# Patient Record
Sex: FEMALE | Race: BLACK OR AFRICAN AMERICAN | Employment: UNEMPLOYED | URBAN - METROPOLITAN AREA
[De-identification: names, ages, dates, MRNs, and addresses within clinical notes are randomized per-mention and may not be internally consistent; named-entity substitution may affect disease eponyms.]

---

## 2017-06-06 ENCOUNTER — HOSPITAL ENCOUNTER (INPATIENT)
Age: 32
LOS: 2 days | Discharge: HOME | DRG: 059 | End: 2017-06-09
Attending: EMERGENCY MEDICINE | Admitting: INTERNAL MEDICINE
Payer: COMMERCIAL

## 2017-06-06 DIAGNOSIS — H46.9 LEFT OPTIC NEURITIS: ICD-10-CM

## 2017-06-06 DIAGNOSIS — G35 MULTIPLE SCLEROSIS, RELAPSING-REMITTING (HCC): ICD-10-CM

## 2017-06-06 DIAGNOSIS — G35 MULTIPLE SCLEROSIS EXACERBATION (HCC): ICD-10-CM

## 2017-06-06 DIAGNOSIS — H46.9 OPTIC NEUROPATHY, LEFT: ICD-10-CM

## 2017-06-06 DIAGNOSIS — H54.62 VISION LOSS OF LEFT EYE: ICD-10-CM

## 2017-06-06 DIAGNOSIS — G35 MULTIPLE SCLEROSIS (HCC): Primary | ICD-10-CM

## 2017-06-06 DIAGNOSIS — G35 EXACERBATION OF MULTIPLE SCLEROSIS (HCC): ICD-10-CM

## 2017-06-06 DIAGNOSIS — I67.89 CEREBRAL MICROVASCULAR DISEASE: ICD-10-CM

## 2017-06-06 DIAGNOSIS — I65.23 STENOSIS OF BOTH INTERNAL CAROTID ARTERIES: ICD-10-CM

## 2017-06-06 DIAGNOSIS — G37.9 DEMYELINATING DISEASE OF CENTRAL NERVOUS SYSTEM (HCC): ICD-10-CM

## 2017-06-06 PROCEDURE — 99284 EMERGENCY DEPT VISIT MOD MDM: CPT

## 2017-06-06 RX ORDER — SODIUM CHLORIDE 0.9 % (FLUSH) 0.9 %
5-10 SYRINGE (ML) INJECTION EVERY 8 HOURS
Status: DISCONTINUED | OUTPATIENT
Start: 2017-06-06 | End: 2017-06-09 | Stop reason: HOSPADM

## 2017-06-06 RX ORDER — SODIUM CHLORIDE 0.9 % (FLUSH) 0.9 %
5-10 SYRINGE (ML) INJECTION AS NEEDED
Status: DISCONTINUED | OUTPATIENT
Start: 2017-06-06 | End: 2017-06-09 | Stop reason: HOSPADM

## 2017-06-06 NOTE — Clinical Note
Status[de-identified] Inpatient [101] Type of Bed: Telemetry [19] Inpatient Hospitalization Certified Necessary for the Following Reasons: 8. Other (further clarification in H&P documentation) Admitting Diagnosis: Multiple sclerosis exacerbation ([facility] [[id number]] Admitting Physician: Michael Douglas, [facility] Attending Physician: Es Chow [[id number]] Estimated Length of Stay: > or = to 2 Midnights Discharge Plan[de-identified] Home with Office Follow-up

## 2017-06-06 NOTE — IP AVS SNAPSHOT
[address] [facility] 
[phone] Patient: Mirta Shown MRN: [id number] :1985 You are allergic to the following Allergen Reactions Sulfa (Sulfonamide Antibiotics) Hives Recent Documentation Height Weight BMI OB Status Smoking Status 1.626 m 56 kg 21.19 kg/m2 Having regular periods Never Smoker Unresulted Labs Order Current Status ANGIOTENSIN CONVERTING ENZYME In process AQUAPORIN-4 RECEPTOR AB In process VITAMIN D, 25 HYROXY PANEL In process VIELKA QL, W/REFLEX CASCADE Preliminary result IMMUNOELECTROPHORESIS [facility].) Preliminary result Emergency Contacts Name Discharge Info Relation Home Work Mobile Tra Veliz DISCHARGE CAREGIVER [3] Spouse [3] [phone] Zunilda Steiner  Parent [1] [phone] About your hospitalization You were admitted on:  2017 You last received care in the:  [facility] [address] NEUROSCIENCE TELEMETRY You were discharged on:  2017 Unit phone number:  [phone] Why you were hospitalized Your primary diagnosis was:  Not on File Your diagnoses also included:  Multiple Sclerosis Exacerbation (Hcc), Multiple Sclerosis, Relapsing-Remitting (Hcc), Exacerbation Of Multiple Sclerosis (Hcc), Left Optic Neuritis, Stenosis Of Both Internal Carotid Arteries, Cerebral Microvascular Disease Providers Seen During Your Hospitalizations Provider Role Specialty Primary office phone Lea Palma MD Attending Provider Emergency Medicine [phone] Harriet Arias MD Attending Provider Internal Medicine [phone] Your Primary Care Physician (PCP) Primary Care Physician Office Phone Office Fax Camila Delgado [phone] Follow-up Information Follow up With Details Comments Contact Info Perez Simon MD On 2017 12:00pm [address] [place] [address] [address] [facility] 
[phone] Monty Mojica MD On 6/12/2017 8:30am      ask to check Hb A1C [address] [facility] 
[phone] Your Appointments Monday June 12, 2017  8:30 AM EDT ROUTINE CARE with Monty Mojica, [address] [facility]) [address] [facility] [address] [facility]  
[phone] Monday July 24, 2017  9:00 AM EDT New Patient with Kim Lane MD  
Neurology Clinic at [facility] [address] [facility]) [address] [facility] 
[address] [facility], [address] [facility]  
[phone] Current Discharge Medication List  
  
START taking these medications Dose & Instructions Dispensing Information Comments Morning Noon Evening Bedtime  
 predniSONE 10 mg tablet Commonly known as:  Blanca An Your last dose was: Your next dose is:    
   
   
 4 tabs daily for 2 days  3 tabs daily for 2 days  2 tabs daily for 2 days  1 tab   daily for 2 days Quantity:  20 Tab Refills:  0 CONTINUE these medications which have NOT CHANGED Dose & Instructions Dispensing Information Comments Morning Noon Evening Bedtime  
 albuterol 90 mcg/actuation inhaler Commonly known as:  Hannah Stauffer Your last dose was: Your next dose is:    
   
   
 Dose:  1 Puff Take 1 Puff by inhalation every six (6) hours as needed. Refills:  0  
     
   
   
   
  
 FLONASE 50 mcg/actuation nasal spray Generic drug:  fluticasone Your last dose was: Your next dose is:    
   
   
 Dose:  2 Spray 2 Sprays by Nasal route daily. Administer to {[facility] RX NASAL each/right/left nostril:98763} nostril. Refills:  0  
     
   
   
   
  
 omeprazole 20 mg capsule Commonly known as:  PRILOSEC Your last dose was: Your next dose is: Dose:  20 mg Take 1 Cap by mouth daily. Quantity:  30 Cap Refills:  0  
     
   
   
   
  
 ORTHO TRI-CYCLEN (28) PO Your last dose was: Your next dose is: Take  by mouth. Refills:  0 ZyrTEC 10 mg tablet Generic drug:  cetirizine Your last dose was: Your next dose is:    
   
   
 Dose:  20 mg Take 20 mg by mouth. Refills:  0 Where to Get Your Medications Information on where to get these meds will be given to you by the nurse or doctor. ! Ask your nurse or doctor about these medications  
  predniSONE 10 mg tablet Discharge Instructions HOSPITALIST DISCHARGE INSTRUCTIONS 
 
NAME: Mirta Guardado :  1985 MRN:  [id number] Date/Time:  2017 9:30 AM 
 
ADMIT DATE: 2017 DISCHARGE DATE: 2017 DISCHARGE DIAGNOSIS: 
Acute Multiple sclerosis Exacerbation Elevated blood sugars MEDICATIONS: 
· It is important that you take the medication exactly as they are prescribed. · Keep your medication in the bottles provided by the pharmacist and keep a list of the medication names, dosages, and times to be taken in your wallet. · Do not take other medications without consulting your doctor. Pain Management: per above medications What to do at [facility] Recommended diet:  Resume previous diet Recommended activity: Activity as tolerated If you have questions regarding the hospital related prescriptions or hospital related issues please call Adalgisa Hickey at . If you experience any of the following symptoms then please call your primary care physician or return to the emergency room if you cannot get hold of your doctor: 
Fever, chills, nausea, vomiting, diarrhea, change in mentation, falling, bleeding, shortness of breath Information obtained by : 
 I understand that if any problems occur once I am at home I am to contact my physician. I understand and acknowledge receipt of the instructions indicated above. Physician's or R.N.'s Signature                                                                  Date/Time Patient or Representative Signature                                                          Date/Time Discharge Orders None [facility] Announcement We are excited to announce that we are making your provider's discharge notes available to you in [employer]. You will see these notes when they are completed and signed by the physician that discharged you from your recent hospital stay. If you have any questions or concerns about any information you see in [employer], please call the Health Information Department where you were seen or reach out to your Primary Care Provider for more information about your plan of care. Introducing [facility] SERVICES! Faith Zavaleta introduces [employer] patient portal. Now you can access parts of your medical record, email your doctor's office, and request medication refills online. 1. In your internet browser, go to [email or web address] 2. Click on the First Time User? Click Here link in the Sign In box. You will see the New Member Sign Up page. 3. Enter your [employer] Access Code exactly as it appears below. You will not need to use this code after youve completed the sign-up process. If you do not sign up before the expiration date, you must request a new code. · [employer] Access Code: [email or web address] Expires: 9/6/2017 11:14 AM 
 
4.  Enter the last four digits of your Social Security Number (xxxx) and Date of Birth (mm/dd/yyyy) as indicated and click Submit. You will be taken to the next sign-up page. 5. Create a [employer] ID. This will be your [employer] login ID and cannot be changed, so think of one that is secure and easy to remember. 6. Create a [employer] password. You can change your password at any time. 7. Enter your Password Reset Question and Answer. This can be used at a later time if you forget your password. 8. Enter your e-mail address. You will receive e-mail notification when new information is available in [address] 9. Click Sign Up. You can now view and download portions of your medical record. 10. Click the Download Summary menu link to download a portable copy of your medical information. If you have questions, please visit the Frequently Asked Questions section of the [employer] website. Remember, [employer] is NOT to be used for urgent needs. For medical emergencies, dial 911. Now available from your iPhone and Android! General Information Please provide this summary of care documentation to your next provider. Patient Signature:  ____________________________________________________________ Date:  ____________________________________________________________  
  
Abigail Farias Provider Signature:  ____________________________________________________________ Date:  ____________________________________________________________

## 2017-06-06 NOTE — IP AVS SNAPSHOT
Current Discharge Medication List  
  
START taking these medications Dose & Instructions Dispensing Information Comments Morning Noon Evening Bedtime  
 predniSONE 10 mg tablet Commonly known as:  Елена Eddy Your last dose was: Your next dose is:    
   
   
 4 tabs daily for 2 days  3 tabs daily for 2 days  2 tabs daily for 2 days  1 tab   daily for 2 days Quantity:  20 Tab Refills:  0 CONTINUE these medications which have NOT CHANGED Dose & Instructions Dispensing Information Comments Morning Noon Evening Bedtime  
 albuterol 90 mcg/actuation inhaler Commonly known as:  Nick Perea Your last dose was: Your next dose is:    
   
   
 Dose:  1 Puff Take 1 Puff by inhalation every six (6) hours as needed. Refills:  0  
     
   
   
   
  
 FLONASE 50 mcg/actuation nasal spray Generic drug:  fluticasone Your last dose was: Your next dose is:    
   
   
 Dose:  2 Spray 2 Sprays by Nasal route daily. Administer to {[facility] RX NASAL each/right/left nostril:00318} nostril. Refills:  0  
     
   
   
   
  
 omeprazole 20 mg capsule Commonly known as:  PRILOSEC Your last dose was: Your next dose is:    
   
   
 Dose:  20 mg Take 1 Cap by mouth daily. Quantity:  30 Cap Refills:  0  
     
   
   
   
  
 ORTHO TRI-CYCLEN (28) PO Your last dose was: Your next dose is: Take  by mouth. Refills:  0 ZyrTEC 10 mg tablet Generic drug:  cetirizine Your last dose was: Your next dose is:    
   
   
 Dose:  20 mg Take 20 mg by mouth. Refills:  0 Where to Get Your Medications Information on where to get these meds will be given to you by the nurse or doctor. ! Ask your nurse or doctor about these medications  
  predniSONE 10 mg tablet

## 2017-06-07 ENCOUNTER — APPOINTMENT (OUTPATIENT)
Dept: MRI IMAGING | Age: 32
DRG: 059 | End: 2017-06-07
Attending: PSYCHIATRY & NEUROLOGY
Payer: COMMERCIAL

## 2017-06-07 PROBLEM — G35 EXACERBATION OF MULTIPLE SCLEROSIS (HCC): Status: ACTIVE | Noted: 2017-06-07

## 2017-06-07 PROBLEM — G35 MULTIPLE SCLEROSIS EXACERBATION (HCC): Status: ACTIVE | Noted: 2017-06-07

## 2017-06-07 PROBLEM — G35 MULTIPLE SCLEROSIS, RELAPSING-REMITTING (HCC): Status: ACTIVE | Noted: 2017-06-07

## 2017-06-07 PROBLEM — H46.9 LEFT OPTIC NEURITIS: Status: ACTIVE | Noted: 2017-06-07

## 2017-06-07 LAB
ALBUMIN SERPL BCP-MCNC: 3.5 G/DL (ref 3.5–5)
ALBUMIN/GLOB SERPL: 1 {RATIO} (ref 1.1–2.2)
ALP SERPL-CCNC: 64 U/L (ref 45–117)
ALT SERPL-CCNC: 24 U/L (ref 12–78)
ANION GAP BLD CALC-SCNC: 4 MMOL/L (ref 5–15)
ANION GAP BLD CALC-SCNC: 5 MMOL/L (ref 5–15)
APPEARANCE UR: CLEAR
AST SERPL W P-5'-P-CCNC: 14 U/L (ref 15–37)
BACTERIA URNS QL MICRO: NEGATIVE /HPF
BASOPHILS # BLD AUTO: 0 K/UL (ref 0–0.1)
BASOPHILS # BLD: 0 % (ref 0–1)
BILIRUB SERPL-MCNC: 0.3 MG/DL (ref 0.2–1)
BILIRUB UR QL: NEGATIVE
BUN SERPL-MCNC: 18 MG/DL (ref 6–20)
BUN SERPL-MCNC: 18 MG/DL (ref 6–20)
BUN/CREAT SERPL: 24 (ref 12–20)
BUN/CREAT SERPL: 26 (ref 12–20)
CALCIUM SERPL-MCNC: 8.1 MG/DL (ref 8.5–10.1)
CALCIUM SERPL-MCNC: 8.9 MG/DL (ref 8.5–10.1)
CHLORIDE SERPL-SCNC: 106 MMOL/L (ref 97–108)
CHLORIDE SERPL-SCNC: 108 MMOL/L (ref 97–108)
CO2 SERPL-SCNC: 27 MMOL/L (ref 21–32)
CO2 SERPL-SCNC: 30 MMOL/L (ref 21–32)
COLOR UR: ABNORMAL
CREAT SERPL-MCNC: 0.68 MG/DL (ref 0.55–1.02)
CREAT SERPL-MCNC: 0.75 MG/DL (ref 0.55–1.02)
EOSINOPHIL # BLD: 0.2 K/UL (ref 0–0.4)
EOSINOPHIL NFR BLD: 3 % (ref 0–7)
EPITH CASTS URNS QL MICRO: ABNORMAL /LPF
ERYTHROCYTE [DISTWIDTH] IN BLOOD BY AUTOMATED COUNT: 13.8 % (ref 11.5–14.5)
GLOBULIN SER CALC-MCNC: 3.5 G/DL (ref 2–4)
GLUCOSE BLD STRIP.AUTO-MCNC: 156 MG/DL (ref 65–100)
GLUCOSE BLD STRIP.AUTO-MCNC: 172 MG/DL (ref 65–100)
GLUCOSE BLD STRIP.AUTO-MCNC: 180 MG/DL (ref 65–100)
GLUCOSE BLD STRIP.AUTO-MCNC: 191 MG/DL (ref 65–100)
GLUCOSE SERPL-MCNC: 83 MG/DL (ref 65–100)
GLUCOSE SERPL-MCNC: 95 MG/DL (ref 65–100)
GLUCOSE UR STRIP.AUTO-MCNC: NEGATIVE MG/DL
HCG UR QL: NEGATIVE
HCT VFR BLD AUTO: 34.6 % (ref 35–47)
HGB BLD-MCNC: 11.4 G/DL (ref 11.5–16)
HGB UR QL STRIP: NEGATIVE
KETONES UR QL STRIP.AUTO: NEGATIVE MG/DL
LEUKOCYTE ESTERASE UR QL STRIP.AUTO: NEGATIVE
LYMPHOCYTES # BLD AUTO: 51 % (ref 12–49)
LYMPHOCYTES # BLD: 3.2 K/UL (ref 0.8–3.5)
MCH RBC QN AUTO: 30.4 PG (ref 26–34)
MCHC RBC AUTO-ENTMCNC: 32.9 G/DL (ref 30–36.5)
MCV RBC AUTO: 92.3 FL (ref 80–99)
MONOCYTES # BLD: 0.6 K/UL (ref 0–1)
MONOCYTES NFR BLD AUTO: 9 % (ref 5–13)
MUCOUS THREADS URNS QL MICRO: ABNORMAL /LPF
NEUTS SEG # BLD: 2.3 K/UL (ref 1.8–8)
NEUTS SEG NFR BLD AUTO: 37 % (ref 32–75)
NITRITE UR QL STRIP.AUTO: NEGATIVE
PH UR STRIP: 5.5 [PH] (ref 5–8)
PLATELET # BLD AUTO: 216 K/UL (ref 150–400)
POTASSIUM SERPL-SCNC: 3.5 MMOL/L (ref 3.5–5.1)
POTASSIUM SERPL-SCNC: 3.8 MMOL/L (ref 3.5–5.1)
PROT SERPL-MCNC: 7 G/DL (ref 6.4–8.2)
PROT UR STRIP-MCNC: NEGATIVE MG/DL
RBC # BLD AUTO: 3.75 M/UL (ref 3.8–5.2)
RBC #/AREA URNS HPF: ABNORMAL /HPF (ref 0–5)
SERVICE CMNT-IMP: ABNORMAL
SODIUM SERPL-SCNC: 139 MMOL/L (ref 136–145)
SODIUM SERPL-SCNC: 141 MMOL/L (ref 136–145)
SP GR UR REFRACTOMETRY: 1.02 (ref 1–1.03)
UA: UC IF INDICATED,UAUC: ABNORMAL
UROBILINOGEN UR QL STRIP.AUTO: 1 EU/DL (ref 0.2–1)
WBC # BLD AUTO: 6.3 K/UL (ref 3.6–11)
WBC URNS QL MICRO: ABNORMAL /HPF (ref 0–4)

## 2017-06-07 PROCEDURE — 82962 GLUCOSE BLOOD TEST: CPT

## 2017-06-07 PROCEDURE — 65660000000 HC RM CCU STEPDOWN

## 2017-06-07 PROCEDURE — 74011000258 HC RX REV CODE- 258: Performed by: EMERGENCY MEDICINE

## 2017-06-07 PROCEDURE — 36415 COLL VENOUS BLD VENIPUNCTURE: CPT | Performed by: EMERGENCY MEDICINE

## 2017-06-07 PROCEDURE — 74011250636 HC RX REV CODE- 250/636: Performed by: INTERNAL MEDICINE

## 2017-06-07 PROCEDURE — 80053 COMPREHEN METABOLIC PANEL: CPT | Performed by: EMERGENCY MEDICINE

## 2017-06-07 PROCEDURE — 85025 COMPLETE CBC W/AUTO DIFF WBC: CPT | Performed by: EMERGENCY MEDICINE

## 2017-06-07 PROCEDURE — 74011250637 HC RX REV CODE- 250/637: Performed by: INTERNAL MEDICINE

## 2017-06-07 PROCEDURE — A9577 INJ MULTIHANCE: HCPCS | Performed by: INTERNAL MEDICINE

## 2017-06-07 PROCEDURE — 81001 URINALYSIS AUTO W/SCOPE: CPT | Performed by: EMERGENCY MEDICINE

## 2017-06-07 PROCEDURE — 80048 BASIC METABOLIC PNL TOTAL CA: CPT | Performed by: INTERNAL MEDICINE

## 2017-06-07 PROCEDURE — 74011250636 HC RX REV CODE- 250/636: Performed by: EMERGENCY MEDICINE

## 2017-06-07 PROCEDURE — 70553 MRI BRAIN STEM W/O & W/DYE: CPT

## 2017-06-07 PROCEDURE — 81025 URINE PREGNANCY TEST: CPT

## 2017-06-07 PROCEDURE — 93880 EXTRACRANIAL BILAT STUDY: CPT

## 2017-06-07 PROCEDURE — 72156 MRI NECK SPINE W/O & W/DYE: CPT

## 2017-06-07 RX ORDER — SODIUM CHLORIDE 9 MG/ML
100 INJECTION, SOLUTION INTRAVENOUS CONTINUOUS
Status: DISCONTINUED | OUTPATIENT
Start: 2017-06-07 | End: 2017-06-08

## 2017-06-07 RX ORDER — INSULIN LISPRO 100 [IU]/ML
INJECTION, SOLUTION INTRAVENOUS; SUBCUTANEOUS
Status: DISCONTINUED | OUTPATIENT
Start: 2017-06-07 | End: 2017-06-09 | Stop reason: HOSPADM

## 2017-06-07 RX ORDER — DEXTROSE 50 % IN WATER (D50W) INTRAVENOUS SYRINGE
12.5-25 AS NEEDED
Status: DISCONTINUED | OUTPATIENT
Start: 2017-06-07 | End: 2017-06-07

## 2017-06-07 RX ORDER — PANTOPRAZOLE SODIUM 40 MG/1
40 TABLET, DELAYED RELEASE ORAL
Status: DISCONTINUED | OUTPATIENT
Start: 2017-06-07 | End: 2017-06-09 | Stop reason: HOSPADM

## 2017-06-07 RX ORDER — IPRATROPIUM BROMIDE AND ALBUTEROL SULFATE 2.5; .5 MG/3ML; MG/3ML
3 SOLUTION RESPIRATORY (INHALATION)
Status: DISCONTINUED | OUTPATIENT
Start: 2017-06-07 | End: 2017-06-09 | Stop reason: HOSPADM

## 2017-06-07 RX ORDER — SODIUM CHLORIDE 0.9 % (FLUSH) 0.9 %
5-10 SYRINGE (ML) INJECTION AS NEEDED
Status: DISCONTINUED | OUTPATIENT
Start: 2017-06-07 | End: 2017-06-09 | Stop reason: HOSPADM

## 2017-06-07 RX ORDER — CETIRIZINE HCL 10 MG
20 TABLET ORAL DAILY
Status: DISCONTINUED | OUTPATIENT
Start: 2017-06-08 | End: 2017-06-09 | Stop reason: HOSPADM

## 2017-06-07 RX ORDER — MAGNESIUM SULFATE 100 %
4 CRYSTALS MISCELLANEOUS AS NEEDED
Status: DISCONTINUED | OUTPATIENT
Start: 2017-06-07 | End: 2017-06-09 | Stop reason: HOSPADM

## 2017-06-07 RX ORDER — LORAZEPAM 2 MG/ML
1-2 INJECTION, SOLUTION INTRAMUSCULAR; INTRAVENOUS
Status: DISCONTINUED | OUTPATIENT
Start: 2017-06-07 | End: 2017-06-09 | Stop reason: HOSPADM

## 2017-06-07 RX ORDER — SODIUM CHLORIDE 0.9 % (FLUSH) 0.9 %
5-10 SYRINGE (ML) INJECTION EVERY 8 HOURS
Status: DISCONTINUED | OUTPATIENT
Start: 2017-06-07 | End: 2017-06-09 | Stop reason: HOSPADM

## 2017-06-07 RX ADMIN — Medication 5 ML: at 21:56

## 2017-06-07 RX ADMIN — GADOBENATE DIMEGLUMINE 11 ML: 529 INJECTION, SOLUTION INTRAVENOUS at 13:36

## 2017-06-07 RX ADMIN — Medication 10 ML: at 06:23

## 2017-06-07 RX ADMIN — Medication 10 ML: at 02:00

## 2017-06-07 RX ADMIN — PANTOPRAZOLE SODIUM 40 MG: 40 TABLET, DELAYED RELEASE ORAL at 09:48

## 2017-06-07 RX ADMIN — SODIUM CHLORIDE 500 ML: 900 INJECTION, SOLUTION INTRAVENOUS at 04:50

## 2017-06-07 RX ADMIN — SODIUM CHLORIDE 100 ML/HR: 900 INJECTION, SOLUTION INTRAVENOUS at 23:43

## 2017-06-07 RX ADMIN — SODIUM CHLORIDE 100 ML/HR: 900 INJECTION, SOLUTION INTRAVENOUS at 04:50

## 2017-06-07 RX ADMIN — SODIUM CHLORIDE 1000 MG: 900 INJECTION, SOLUTION INTRAVENOUS at 02:00

## 2017-06-07 NOTE — ED NOTES
Report received from [facility]. ARY, Jeremias, ED Summary and MAR was discussed.     Johanna Vazquez RN

## 2017-06-07 NOTE — ED PROVIDER NOTES
HPI Comments: Asael Mijares is a 33 y.o. female, who presents ambulatory to the ED c/o constant L eye blurred vision x 2 days. She reports a hx of similar sxs. Pt states she previously had a complete w/u for MS, but was later informed she had a viral infection of the optic nerve. She reports her last episode was in  and treated with steroids. She notes having an appointment scheduled with neurology for 2017. Pt reports having corrective lasik eye surgery ~1 year ago and denies any changes in vision since. She denies any recent medications for her current sxs. Pt specifically denies any recent fever, chills, neck pain, nausea, vomiting, diarrhea, abd pain, CP, SOB, lightheadedness, dizziness, BLE swelling, HA, heart palpitations, urinary sxs, changes in BM, changes in PO intake, melena, hematochezia, cough, or congestion. PCP: Tamara Yates MD  Neurology: Александр Tyler  Opthalmology: Maddy Downing    PMHx: Significant for asthma, GERD  PSHx: Significant for T&A, , Lasik eye surgery  Social Hx: -tobacco, +EtOH (rarely), -Illicit Drugs    There are no other complaints, changes, or physical findings at this time. The history is provided by the patient. Past Medical History:   Diagnosis Date    Asthma     Gastrointestinal disorder     reflux       Past Surgical History:   Procedure Laterality Date    HX GYN  10/2013    c section    HX HEENT      tonsils, adenoids, PE tubes         Family History:   Problem Relation Age of Onset    Heart Disease Mother     Seizures Maternal Grandmother        Social History     Social History    Marital status: SINGLE     Spouse name: N/A    Number of children: N/A    Years of education: N/A     Occupational History    Not on file.      Social History Main Topics    Smoking status: Never Smoker    Smokeless tobacco: Not on file    Alcohol use Yes      Comment: Rare    Drug use: Not on file    Sexual activity: No     Other Topics Concern    Not on file     Social History Narrative         ALLERGIES: Sulfa (sulfonamide antibiotics)    Review of Systems   Constitutional: Negative. Negative for chills, diaphoresis and fever. HENT: Negative for congestion. Eyes: Positive for visual disturbance (L eye blurred vision). Respiratory: Negative. Negative for cough and shortness of breath. Cardiovascular: Negative for chest pain, palpitations and leg swelling. Gastrointestinal: Negative for abdominal pain, constipation, diarrhea, nausea and vomiting. Endocrine: Negative. Genitourinary: Negative. Negative for difficulty urinating, dysuria, frequency and hematuria. Musculoskeletal: Negative. Skin: Negative. Allergic/Immunologic: Negative. Neurological: Negative. Negative for dizziness, numbness and headaches. Psychiatric/Behavioral: Negative for suicidal ideas. All other systems reviewed and are negative. Vitals:    06/06/17 2108   BP: 97/69   Pulse: 71   Resp: 18   Temp: 98.4 °F (36.9 °C)   SpO2: 100%   Weight: 56 kg (123 lb 7.3 oz)   Height: 5' 4\" (1.626 m)            Physical Exam   Nursing note and vitals reviewed.   General appearance - well nourished, well appearing, and in no distress  Eyes - pupils equal and reactive, extraocular eye movements intact  ENT - mucous membranes moist, pharynx normal without lesions  Neck - supple, no significant adenopathy; non-tender to palpation  Chest - clear to auscultation, no wheezes, rales or rhonchi; non-tender to palpation  Heart - normal rate and regular rhythm, S1 and S2 normal, no murmurs noted  Abdomen - soft, nontender, nondistended, no masses or organomegaly  Musculoskeletal - no joint tenderness, deformity or swelling; normal ROM  Extremities - peripheral pulses normal, no pedal edema  Skin - normal coloration and turgor, no rashes  Neurological - alert, oriented x3, normal speech, no focal findings or movement disorder noted  Written by Sari Contreras, ED Gageibe, as dictated by Eloina Wong MD    MDM  Number of Diagnoses or Management Options  Diagnosis management comments:   DDx: optic neuritis, optic neuropathy, MS       Amount and/or Complexity of Data Reviewed  Clinical lab tests: reviewed and ordered  Review and summarize past medical records: yes  Discuss the patient with other providers: yes (Neurology / Hospitalist)    Patient Progress  Patient progress: stable      Procedures       CONSULT NOTE:   12:23 AM  Eloina Wong MD spoke with Dr. Mario Jules,   Specialty: Neurology  Discussed pt's hx, disposition, and available diagnostic and imaging results. Reviewed care plans. Consultant recommends admission through hospitalist and dosing with 1g Solumedrol x 3 days. Neurology will follow. Written by Willie Galarza ED Scribe, as dictated by Eloina Wong MD.    CONSULT NOTE:   12:30 AM  Yanci Berry MD spoke with Lennox Claude, MD,   Specialty: Hospitalist  Discussed pt's hx, disposition, and available diagnostic and imaging results. Reviewed care plans. Consultant will evaluate pt for admission. Written by Willie Galarza ED Scribe, as dictated by Yanci Berry MD      LABORATORY TESTS:  Recent Results (from the past 12 hour(s))   CBC WITH AUTOMATED DIFF    Collection Time: 06/07/17 12:02 AM   Result Value Ref Range    WBC 6.3 3.6 - 11.0 K/uL    RBC 3.75 (L) 3.80 - 5.20 M/uL    HGB 11.4 (L) 11.5 - 16.0 g/dL    HCT 34.6 (L) 35.0 - 47.0 %    MCV 92.3 80.0 - 99.0 FL    MCH 30.4 26.0 - 34.0 PG    MCHC 32.9 30.0 - 36.5 g/dL    RDW 13.8 11.5 - 14.5 %    PLATELET 129 915 - 683 K/uL    NEUTROPHILS 37 32 - 75 %    LYMPHOCYTES 51 (H) 12 - 49 %    MONOCYTES 9 5 - 13 %    EOSINOPHILS 3 0 - 7 %    BASOPHILS 0 0 - 1 %    ABS. NEUTROPHILS 2.3 1.8 - 8.0 K/UL    ABS. LYMPHOCYTES 3.2 0.8 - 3.5 K/UL    ABS. MONOCYTES 0.6 0.0 - 1.0 K/UL    ABS. EOSINOPHILS 0.2 0.0 - 0.4 K/UL    ABS.  BASOPHILS 0.0 0.0 - 0.1 K/UL         MEDICATIONS GIVEN:  Medications   sodium chloride (NS) flush 5-10 mL (not administered)   sodium chloride (NS) flush 5-10 mL (not administered)   methylPREDNISolone (PF) (SOLU-MEDROL) injection 1,000 mg (not administered)       IMPRESSION:  1. Multiple sclerosis ([address]    2. Exacerbation of multiple sclerosis ([address]    3. Left optic neuritis    4. Multiple sclerosis exacerbation ([address]    5. Multiple sclerosis, relapsing-remitting ([address]    6. Demyelinating disease of central nervous system (HCC)    7. Optic neuropathy, left    8. Cerebral microvascular disease    9. Stenosis of both internal carotid arteries    10. Vision loss of left eye        PLAN:  1. Admit to hospitalist    ADMISSION NOTE:  12:30 AM  Patient is being admitted to the hospital by Dr. Andry Oliver. The results of their tests and reasons for their admission have been discussed with them and/or available family. They convey agreement and understanding for the need to be admitted and for their admission diagnosis. Written by Kaity Ko ED Scribe, as dictated by Deny Calderón MD.        This note is prepared by Robert Man, acting as Scribe for MD Yanci Jaramillo MD : The scribe's documentation has been prepared under my direction and personally reviewed by me in its entirety. I confirm that the note above accurately reflects all work, treatment, procedures, and medical decision making performed by me. This note will not be viewable in [address]

## 2017-06-07 NOTE — PROGRESS NOTES
[facility]  Preliminary Report:  Carotid Duplex Scan    Right:  No plaque noted in the right carotid system. Right ICA velocities suggest 0% diameter reduction. Right vertebral artery flow is antegrade. Left:  No plaque noted in the left carotid system. Left ICA velocities suggest 0% diameter reduction. Left vertebral artery flow is antegrade. Final report to follow.

## 2017-06-07 NOTE — PROGRESS NOTES
Physical Therapy    Received PT eval order. Chart reviewed. Pt off the floor for MRI initially, returned and now has IP bed assignment to go up to IP floor. Will see pt for eval on IP floor when available.     Taina Eason, PT

## 2017-06-07 NOTE — ED NOTES
32 yo female Pt with PMH of MS presents to ED for blurry left eye vision x 2 days, oriented to room and call bell, cardiac and continuous spO2 monitoring initiated, IV started, blood samples collected and sent to lab for analysis, plan of care reviewed, call bell in reach, side rails up x2, will continue to monitor. 0335  Pt resting comfortably, sleeping but easily aroused, mother and daughter at bedside, no sign or symptoms of pain or discomfort, VS WNL, frequent checks, IVF IV steroids infusing as ordered, bed in lowest position, side rails up x2, call bell within reach, will continue to monitor.

## 2017-06-07 NOTE — ROUTINE PROCESS
Bedside and Verbal shift change report given to [facility] (oncoming nurse) by Patrick Richards (offgoing nurse). Report included the following information SBAR, Kardex, Intake/Output and MAR. Zone Phone:   [phone]      Significant changes during shift:  New admit        Patient Information    Noel Kruger  33 y.o.  2017 10:32 PM by Mis Finn MD. Noel Kruger was admitted from Home    Problem List    Patient Active Problem List    Diagnosis Date Noted    Multiple sclerosis exacerbation ([facility] 2017    S/P  10/17/2013    Demyelinating disease of central nervous system ([facility] 2011    Sinusitis 2011    Optic neuropathy, left - recurrent 2011     Past Medical History:   Diagnosis Date    Asthma     Gastrointestinal disorder     reflux    Multiple sclerosis ([facility]            Activity Status:    OOB to Chair Yes  Ambulated this shift Yes   Bed Rest No          Patient Safety:    Falls Score Total Score: 1  Safety Level_______  Bed Alarm On? No  Sitter?  No    Plan for upcoming shift: safety        Discharge Plan: No     Active Consults:  IP CONSULT TO NEUROLOGY  IP CONSULT TO NEUROLOGY

## 2017-06-07 NOTE — CONSULTS
[address]       Name:  Coral Candelaria   MR#:  [id number]   :  1985   Account #:  [de-identified]    Date of Consultation:  2017   Date of Adm:  2017       CHIEF COMPLAINT: Loss of vision, left eye. HISTORY OF PRESENT ILLNESS: We are requested to evaluate this   pleasant 73-year-old right-handed female with a sudden loss of vision   in her left eye at the request of Dr Drenda Castleman. The patient gives a history that she had a sudden loss   of vision or visual change that began on Saturday. Her vision seemed   blur on her. It seemed to stabilize, now being a little bit better after her   first dose of steroids. The patient had 2 other episodes previously. One   in  and one in , both the same thing. Her workup in ,   apparently was negative with normal MRI of the brain and a previous   spinal tap in addition. She felt that she had some viral-type syndrome. She had a recurring episode in , at which time she had an   abnormal MRI of the brain showing progressive white matter disease   with one enhancing lesion, probably and she had a spinal tap, which   suggested MS with 2-3 oligoclonal bands present. She had elevated   IgG synthesis rate, also suggestive of demyelinating disease. She also had an   MRI of the cervical spine that showed some probable lesions that   were nonenhancing in the cervical cord also. The patient had been well   since that time. Her vision came back to normal again after each   time she got IV steroids. MRI scan back in  also showed   enhancement of the left optic nerve typical of optic neuritis. The patient   has had no headache, no fever with this episode. No other focal   weakness, sensory loss. She has not had any recurring symptoms at   all since the last episode in .  She has no other major medical   problems other than having kidney stones one time in the past.     The patient's past medical history is pertinent for kidney stones only. She does not seem to have any other active medical problems now. She did have a  in the past. She has some GERD. She has   some asthma in the past.    MEDICATIONS: She takes Prilosec p.r.n. for her stomach. She takes   hormonal birth control pills, Progesterone and Ortho Tri-Cyclen 28. She also takes Zyrtec 20 mg a day for allergies and takes   hydrocodone p.r.n. pain. She is also on Proventil inhaler. SOCIAL HISTORY: The patient does not smoke. She drinks socially. Does not use drugs. She is . She works daily. ALLERGIES: THE PATIENT HAS ALLERGIES TO SULFA, BUT NO   OTHER ALLERGIES NOTED. FAMILY HISTORY: Pertinent in that she has 1 child in good health. Siblings have a history of asthma and allergies. Her mother has   diabetes, heart disease, kidney stones, renal disease, high blood   pressure and cholesterol. Father is an alcoholic and had colon cancer. REVIEW OF SYSTEMS: The patient's complete review of systems   was pertinent for 1, she had the visual loss in the left eye. No other   neurological symptoms. On a complete 12 system review of systems,   she had no other positive review of systems. PHYSICAL EXAMINATION: Temperature equals 98°, blood pressure 125/80, pulse equal 80, respirations equal 12. NEUROLOGIC: The patient's neurologic examination now shows that   she has a visual acuity of 20/40 in the left eye and 20/20 in the right   eye with near vision on hand card. The patient has pupils that are   equal and reactive to light. I do not really see a clear Shashank Layne pupil. The   patient has full visual fields. Her fundi are a little difficult to evaluate   because she has a child in her arms. I do not see any gross   abnormalities. The patient has no facial asymmetry. Her hearing is   normal. All her cranial nerves look intact. Speech is fluent.  There is no   aphasia or dysarthria. Her neck is supple without bruits. She has   normal strength and reflexes in all extremities. MOTOR: No Babinski, no clonus. Strength looks normal. She has no   arm drift. Double simultaneous stimulation is intact. Sensory   examination to pin is intact. Finger-nose-finger examination shows no   major dysmetria. Muscle tone is normal. She is neat in appearance. Muscle bulk is normal. She was not ambulated at this time because   she is holding her child. She has normal ambulation at this time. HEAD/EYES/EARS/NOSE/THROAT: Examination shows no   significant lesions. NECK: Supple, without bruit. CHEST: Sounds essentially clear to auscultation. CARDIAC: Examination shows regular rhythm. No significant murmurs,   gallops, or rubs. ABDOMEN: Benign. No masses or tenderness. Bowel and bladder   function is normal.    MUSCULOSKELETAL: Examination shows no swollen or tender joints. SKIN: She has no skin lesions or rashes. VASCULAR: She has no edema. Pulses all good. LYMPHATICS: She has no lymphadenopathy or tender nodes. SYSTEMIC: No fever, no meningismus. NEUROPSYCHIATRIC: She is awake, alert, oriented x4, cooperative   and fluent. Her mental status is normal.    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 PLAN: At the present time, get an MRI of the brain and cervical spine. May need a thoracic spine down the way. We will continue IV steroids   for 3 days. The patient is to get a complete metabolic screen for any   other type of connective tissue disease, rule out Devic disease, rule   out sarcoid, rule out any other cause for her symptoms. The patient   most likely have multiple sclerosis in view of her past workup. o do not   think we will need a spinal tap yet. We will wait and see what her   results come back as. She is probably going to need some disease   modifying drugs for MS. Difficult case.  Discussed with the patient and her mother at length.  Reviewed old charts personally on the PACS   system and I conclude that, indeed, she most likely has MD PALMIRA Nunez / Mona.Milo   D:  06/07/2017   09:26   T:  06/07/2017   11:17   Job #:  [id number]

## 2017-06-07 NOTE — PROGRESS NOTES
Pt seen and examined at bedside.  No change in plan from earlier assessment and plan from Dr Matti Garcia H&P.

## 2017-06-07 NOTE — H&P
Hospitalist Admission Note    NAME: Cassandra Venegas   :  1985   MRN:  [id number]     Date/Time:  2017 1:37 AM    Patient PCP: Bita Carballo MD   Neurology: Rachna Herron  Opthalmology: Ashley Reynolds  ________________________________________________________________________    Given the patient's current clinical presentation, I have a high level of concern for decompensation if discharged from the ED. Complex decision making was performed which includes reviewing the patient's available past medical records, laboratory results, and Xray films. I have also directly communicated my plan and discussed this case with the involved ED physician. My assessment of this patient's clinical condition and my plan of care is as follows:    Assessment / Plan:  Multiple sclerosis, acute on chronic  -IV solumedrol 1000mg IV daily x 3 doses  -IP consult to Neurology - ? Dc to OP infusion center vs IP further evaluation. She may require a more thorough work up to elaborate upon her MS since she did not achieve follow up - she needs to be sure to follow OP - may need MRI this Admission since none have been done since     Dehydration  -IVF  bolus x 1 now  -place on continuous fluids 100cc/hr  -encourage PO intake    Prior hx of diabetes (?gestational)   -will check POC bs  -may need NPH with steroids     I have personally reviewed the radiographs, laboratory data in Epic and decisions and statements above are based partially on this personal interpretation. Code Status: Full Code  DVT Prophylaxis: Hep SQ  GI Prophylaxis: not indicated          Subjective:   CHIEF COMPLAINT: \"I had trouble with vision again\"    HISTORY OF PRESENT ILLNESS:     Dago Christensen is a 33 y.o.  female with known history as listed below presents to ED with complaint noted above. Available records were reviewed at the time of H&P.  Patient with prior dx of MS by work up with Neurology  and further possible dx of \"viral optic neuritis\" whom received steroids for left eye vision changes last in 2011. She has not seen Neurology OP since 2005. She was to follow with Neurolgoy OP on 7/24/2017. She reports having lasix surgery 1yr ago and tolerated well. She reports further change to left eye with blurring of vision x 2 days. No halos. No central clearing. She notes no ha/change in swallow, weakness. No chest pain/pressure/sob. No LH. No new medications, travel, sick contacts. Neurology was called from ED by MD - they wish IP evaluation. We were asked to admit for work up and evaluation of the above problems. Past Medical History:   Diagnosis Date    Asthma     Gastrointestinal disorder     reflux      Past Surgical History:   Procedure Laterality Date    HX GYN  10/2013    c section    HX HEENT      tonsils, adenoids, PE tubes     Social History   Substance Use Topics    Smoking status: Never Smoker    Smokeless tobacco: Not on file    Alcohol use Yes      Comment: Rare      Family History   Problem Relation Age of Onset    Heart Disease Mother     Seizures Maternal Grandmother         Allergies   Allergen Reactions    Sulfa (Sulfonamide Antibiotics) Hives        Prior to Admission medications    Medication Sig Start Date End Date Taking? Authorizing Provider   omeprazole (PRILOSEC) 20 mg capsule Take 1 Cap by mouth daily. 8/15/16   Barrie Jones MD   omeprazole (PRILOSEC) 20 mg capsule Take 1 Cap by mouth daily. 8/15/16   Barrie Jones MD   NORGESTIMATE-ETHINYL ESTRADIOL (ORTHO TRI-CYCLEN, 28, PO) Take  by mouth. Olinda Headley MD   cetirizine (ZYRTEC) 10 mg tablet Take 20 mg by mouth. Olinda Headley MD   HYDROcodone-acetaminophen (NORCO) 5-325 mg per tablet Take 1 Tab by mouth every four (4) hours as needed for Pain. Max Daily Amount: 6 Tabs. 11/19/15   EILEEN Monet   albuterol (PROVENTIL, VENTOLIN) 90 mcg/Actuation inhaler Take 1 Puff by inhalation every six (6) hours as needed.     Olinda Headley MD   fluticasone Amatoaudrey Lopez) 50 mcg/Actuation nasal spray 2 Sprays by Nasal route daily. Administer to right and left nostril.      Phys Other, MD     REVIEW OF SYSTEMS:  See HPI for details  General: negative for fever, chills, sweats, weakness, weight loss  Eyes: +left eye blurred vision, NO eye pain, loss of vision, diplopia  Ear Nose and Throat: negative for rhinorrhea, pharyngitis, otalgia, tinnitus, speech or swallowing difficulties  Respiratory:  negative for pleuritic pain, cough, sputum production, wheezing, SOB, BERNSTEIN  Cardiology:  negative for chest pain, palpitations, orthopnea, PND, edema, syncope   Gastrointestinal: negative for abdominal pain, N/V, dysphagia, change in bowel habits, bleeding  Genitourinary: negative for frequency, urgency, dysuria, hematuria, incontinence  Muskuloskeletal : negative for arthralgia, myalgia  Hematology: negative for easy bruising, bleeding, lymphadenopathy  Dermatological: negative for rash, ulceration, mole change, new lesion  Endocrine: negative for hot flashes or polydipsia  Neurological: negative for headache, dizziness, confusion, focal weakness, paresthesia, memory loss, gait disturbance  Psychological: negative for anxiety, depression, agitation    Objective:   VITALS:    Visit Vitals    BP 97/69 (BP 1 Location: Left arm, BP Patient Position: Sitting)    Pulse 71    Temp 98.4 °F (36.9 °C)    Resp 18    Ht 5' 4\" (1.626 m)    Wt 56 kg (123 lb 7.3 oz)    SpO2 100%    BMI 21.19 kg/m2     PHYSICAL EXAM:     GENERAL:    WD y   WN y   Cachectic    Thin y   Obese n   Disheveled n   Ill Appearing Critically n   Ill Appearing Chronically n   Acute Distress n   Other      HEENT:    NC/AT/EOMI y   PERRLA y   Conjunctivae Pink    Conjunctivae Pale y   Moist Mucosa    Dry Mucosa y   Hearing intact to voice y   Other      NECK:    Supple y   Masses n   Thyroid Tender n   Other                   RESPIRATORY:    CTA bilaterally WITHOUT wheezing/rhonchi/rales or crackles y   Wheezing    Rhonchi Crackles    Use of accessory muscles n   Other      CARDIAC:    regular rate and rhythm No murmurs/rubs/gallops y   Murmur    Rubs    Gallops    Rate Regular/Irregular reg   Carotid Bruit Left/Right    Lower Extremity Edema n   JVP  n   Other Normal capillary refill     ABDOMEN:    Soft non distended non tender +bowel sounds no HSM y   Rigid    Tenderness    Hepatomegaly    Splenomegaly    Distended n   Increased girth due to habitus    Normal/Hyper/Hypo Active Bowel Sounds nml   Other      SKIN / MUSCULOSKELETAL:    Rashes n   Ecchymosis n   Ulcers    Tight to palpitation    Turgor Good/Poor g   Cyanosis/Clubbing n   Amputation(s) n   Other      NEUROLOGY:    cranial nerves II-XII grossly intact y   Cranial Nerve Deficit    Facial Droop n   Slurred Speech n   Aphasia    Strength Normal y   Weakness n   Meningismus/Kernig's Sign/ Brudzinsky n   Follows Commands y   Other Left eye with blurring. Sharp disc left     PSYCHIATRIC:    AAOx3 in no acute distress y   Insight Poor    Insight Good y   Alert and Oriented to Person     Alert and Oriented to Place    Alert and Oriented toTime    Depressed n   Anxious n   Agitated    Lethargic n   Stuporous n   Sedated    Other    _______________________________________________________________________  Care Plan discussed with:    Comments   Patient x Discussed with patient in room.  POC outlined and Questions answered (20   Family  x Mother in room   RN x 5   Care Manager                    Consultant:  anival CROFT MD 5   _______________________________________________________________________  Recommended Disposition:   Home with Family y   HH/PT/OT/RN    SNF/LTC    LIZ    ________________________________________________________________________  TOTAL TIME:  39 Minutes    Critical Care Provided     Minutes non procedure based      Comments   >50% of visit spent in counseling and coordination of care x Chart review  Discussion with patient and/or family and questions answered ________________________________________________________________________  Signed: Brenda Sue MD    This note will not be viewable in [address] Procedures: see electronic medical records for all procedures/Xrays and details which were not copied into this note but were reviewed prior to creation of Plan. LAB DATA REVIEWED:    Recent Results (from the past 24 hour(s))   CBC WITH AUTOMATED DIFF    Collection Time: 06/07/17 12:02 AM   Result Value Ref Range    WBC 6.3 3.6 - 11.0 K/uL    RBC 3.75 (L) 3.80 - 5.20 M/uL    HGB 11.4 (L) 11.5 - 16.0 g/dL    HCT 34.6 (L) 35.0 - 47.0 %    MCV 92.3 80.0 - 99.0 FL    MCH 30.4 26.0 - 34.0 PG    MCHC 32.9 30.0 - 36.5 g/dL    RDW 13.8 11.5 - 14.5 %    PLATELET 070 281 - 465 K/uL    NEUTROPHILS 37 32 - 75 %    LYMPHOCYTES 51 (H) 12 - 49 %    MONOCYTES 9 5 - 13 %    EOSINOPHILS 3 0 - 7 %    BASOPHILS 0 0 - 1 %    ABS. NEUTROPHILS 2.3 1.8 - 8.0 K/UL    ABS. LYMPHOCYTES 3.2 0.8 - 3.5 K/UL    ABS. MONOCYTES 0.6 0.0 - 1.0 K/UL    ABS. EOSINOPHILS 0.2 0.0 - 0.4 K/UL    ABS. BASOPHILS 0.0 0.0 - 0.1 K/UL   METABOLIC PANEL, COMPREHENSIVE    Collection Time: 06/07/17 12:02 AM   Result Value Ref Range    Sodium 141 136 - 145 mmol/L    Potassium 3.8 3.5 - 5.1 mmol/L    Chloride 106 97 - 108 mmol/L    CO2 30 21 - 32 mmol/L    Anion gap 5 5 - 15 mmol/L    Glucose 95 65 - 100 mg/dL    BUN 18 6 - 20 MG/DL    Creatinine 0.75 0.55 - 1.02 MG/DL    BUN/Creatinine ratio 24 (H) 12 - 20      GFR est AA >60 >60 ml/min/1.73m2    GFR est non-AA >60 >60 ml/min/1.73m2    Calcium 8.9 8.5 - 10.1 MG/DL    Bilirubin, total 0.3 0.2 - 1.0 MG/DL    ALT (SGPT) 24 12 - 78 U/L    AST (SGOT) 14 (L) 15 - 37 U/L    Alk.  phosphatase 64 45 - 117 U/L    Protein, total 7.0 6.4 - 8.2 g/dL    Albumin 3.5 3.5 - 5.0 g/dL    Globulin 3.5 2.0 - 4.0 g/dL    A-G Ratio 1.0 (L) 1.1 - 2.2     HCG URINE, QL. - POC    Collection Time: 06/07/17 12:53 AM   Result Value Ref Range    Pregnancy test,urine (POC) NEGATIVE  NEG URINALYSIS W/ REFLEX CULTURE    Collection Time: 06/07/17 12:56 AM   Result Value Ref Range    Color YELLOW/STRAW      Appearance CLEAR CLEAR      Specific gravity 1.023 1.003 - 1.030      pH (UA) 5.5 5.0 - 8.0      Protein NEGATIVE  NEG mg/dL    Glucose NEGATIVE  NEG mg/dL    Ketone NEGATIVE  NEG mg/dL    Bilirubin NEGATIVE  NEG      Blood NEGATIVE  NEG      Urobilinogen 1.0 0.2 - 1.0 EU/dL    Nitrites NEGATIVE  NEG      Leukocyte Esterase NEGATIVE  NEG      WBC 0-4 0 - 4 /hpf    RBC 0-5 0 - 5 /hpf    Epithelial cells FEW FEW /lpf    Bacteria NEGATIVE  NEG /hpf    UA:UC IF INDICATED CULTURE NOT INDICATED BY UA RESULT CNI      Mucus 1+ (A) NEG /lpf

## 2017-06-08 PROBLEM — I67.89 CEREBRAL MICROVASCULAR DISEASE: Status: ACTIVE | Noted: 2017-06-08

## 2017-06-08 PROBLEM — I65.23 STENOSIS OF BOTH INTERNAL CAROTID ARTERIES: Status: ACTIVE | Noted: 2017-06-08

## 2017-06-08 LAB
CK SERPL-CCNC: 66 U/L (ref 26–192)
ERYTHROCYTE [SEDIMENTATION RATE] IN BLOOD: 5 MM/HR (ref 0–20)
FOLATE SERPL-MCNC: 9.1 NG/ML (ref 5–21)
GLUCOSE BLD STRIP.AUTO-MCNC: 134 MG/DL (ref 65–100)
GLUCOSE BLD STRIP.AUTO-MCNC: 165 MG/DL (ref 65–100)
GLUCOSE BLD STRIP.AUTO-MCNC: 170 MG/DL (ref 65–100)
GLUCOSE BLD STRIP.AUTO-MCNC: 242 MG/DL (ref 65–100)
MAGNESIUM SERPL-MCNC: 2 MG/DL (ref 1.6–2.4)
RPR SER QL: NONREACTIVE
SERVICE CMNT-IMP: ABNORMAL
TSH SERPL DL<=0.05 MIU/L-ACNC: 0.42 UIU/ML (ref 0.36–3.74)
VIT B12 SERPL-MCNC: 874 PG/ML (ref 211–911)

## 2017-06-08 PROCEDURE — 82550 ASSAY OF CK (CPK): CPT | Performed by: PSYCHIATRY & NEUROLOGY

## 2017-06-08 PROCEDURE — 86235 NUCLEAR ANTIGEN ANTIBODY: CPT | Performed by: PSYCHIATRY & NEUROLOGY

## 2017-06-08 PROCEDURE — 36415 COLL VENOUS BLD VENIPUNCTURE: CPT | Performed by: PSYCHIATRY & NEUROLOGY

## 2017-06-08 PROCEDURE — 74011250637 HC RX REV CODE- 250/637: Performed by: INTERNAL MEDICINE

## 2017-06-08 PROCEDURE — 65660000000 HC RM CCU STEPDOWN

## 2017-06-08 PROCEDURE — 82784 ASSAY IGA/IGD/IGG/IGM EACH: CPT | Performed by: PSYCHIATRY & NEUROLOGY

## 2017-06-08 PROCEDURE — 74011000258 HC RX REV CODE- 258: Performed by: INTERNAL MEDICINE

## 2017-06-08 PROCEDURE — 82746 ASSAY OF FOLIC ACID SERUM: CPT | Performed by: PSYCHIATRY & NEUROLOGY

## 2017-06-08 PROCEDURE — 84443 ASSAY THYROID STIM HORMONE: CPT | Performed by: PSYCHIATRY & NEUROLOGY

## 2017-06-08 PROCEDURE — 82607 VITAMIN B-12: CPT | Performed by: PSYCHIATRY & NEUROLOGY

## 2017-06-08 PROCEDURE — 85652 RBC SED RATE AUTOMATED: CPT | Performed by: PSYCHIATRY & NEUROLOGY

## 2017-06-08 PROCEDURE — 74011250636 HC RX REV CODE- 250/636: Performed by: INTERNAL MEDICINE

## 2017-06-08 PROCEDURE — 83520 IMMUNOASSAY QUANT NOS NONAB: CPT | Performed by: PSYCHIATRY & NEUROLOGY

## 2017-06-08 PROCEDURE — 86592 SYPHILIS TEST NON-TREP QUAL: CPT | Performed by: PSYCHIATRY & NEUROLOGY

## 2017-06-08 PROCEDURE — 86038 ANTINUCLEAR ANTIBODIES: CPT | Performed by: PSYCHIATRY & NEUROLOGY

## 2017-06-08 PROCEDURE — 83735 ASSAY OF MAGNESIUM: CPT | Performed by: PSYCHIATRY & NEUROLOGY

## 2017-06-08 PROCEDURE — 74011636637 HC RX REV CODE- 636/637: Performed by: INTERNAL MEDICINE

## 2017-06-08 PROCEDURE — 82164 ANGIOTENSIN I ENZYME TEST: CPT | Performed by: PSYCHIATRY & NEUROLOGY

## 2017-06-08 PROCEDURE — 86225 DNA ANTIBODY NATIVE: CPT | Performed by: PSYCHIATRY & NEUROLOGY

## 2017-06-08 PROCEDURE — 82962 GLUCOSE BLOOD TEST: CPT

## 2017-06-08 PROCEDURE — 82306 VITAMIN D 25 HYDROXY: CPT | Performed by: PSYCHIATRY & NEUROLOGY

## 2017-06-08 RX ORDER — ENOXAPARIN SODIUM 100 MG/ML
30 INJECTION SUBCUTANEOUS EVERY 24 HOURS
Status: DISCONTINUED | OUTPATIENT
Start: 2017-06-08 | End: 2017-06-09 | Stop reason: HOSPADM

## 2017-06-08 RX ADMIN — Medication 10 ML: at 22:34

## 2017-06-08 RX ADMIN — Medication 10 ML: at 13:18

## 2017-06-08 RX ADMIN — CETIRIZINE HYDROCHLORIDE 20 MG: 10 TABLET, FILM COATED ORAL at 08:51

## 2017-06-08 RX ADMIN — SODIUM CHLORIDE 1000 MG: 900 INJECTION, SOLUTION INTRAVENOUS at 02:10

## 2017-06-08 RX ADMIN — SODIUM CHLORIDE 100 ML/HR: 900 INJECTION, SOLUTION INTRAVENOUS at 11:01

## 2017-06-08 RX ADMIN — PANTOPRAZOLE SODIUM 40 MG: 40 TABLET, DELAYED RELEASE ORAL at 08:49

## 2017-06-08 RX ADMIN — INSULIN LISPRO 2 UNITS: 100 INJECTION, SOLUTION INTRAVENOUS; SUBCUTANEOUS at 16:26

## 2017-06-08 RX ADMIN — Medication 10 ML: at 07:13

## 2017-06-08 RX ADMIN — ENOXAPARIN SODIUM 30 MG: 30 INJECTION SUBCUTANEOUS at 12:38

## 2017-06-08 NOTE — PROGRESS NOTES
Pt is a 33 y.o female admitted with Multiple Sclerosis Exacerbation. Pt was alert, oriented and in no distress resting in bed with her 1 y.o daughter and mother at the bedside. Demographic information verified and all is correct. This CM added pt's PCP, Dr. Diomedes Guillen. Pt lives with her  and child in a 1 story home with 4 steps to the entrance. Denies using DME or having home health or rehab in the past. Prior to admission, pt was independent with her ADL's and IADL's. Pt drives and works in [employer]. Preferred pharmacy is [facility]. Pt's PCP is Dr. Diomedes Guillen and pt hasn't seen her in 3 years. Pt stated she has an appointment July 3rd. Pt's family can transport her home at discharge. CM consult noted. CM will continue to follow for discharge planning needs. Care Management Interventions  PCP Verified by CM: Yes (Dr. Diomedes Guillen - saw her 3 years ago)  Mode of Transport at Discharge:  Other (see comment) (pt's family can transport by car)  Transition of Care Consult (CM Consult): Discharge Planning  Discharge Durable Medical Equipment: No  Physical Therapy Consult: Yes  Occupational Therapy Consult: No  Speech Therapy Consult: No  Current Support Network: Lives with Spouse, Own Home (lives with her family in a 1 story home with 4 steps to the entrance)  Confirm Follow Up Transport: Family  Discharge Location  Discharge Placement: [facility]ing [facility] [address], [address] [place]

## 2017-06-08 NOTE — PROGRESS NOTES
Hospitalist Progress Note    NAME: Shawna Mckeon   :  1985   MRN:  [id number]     Assessment / Plan:  Acute Multiple sclerosis Exacerbation  No improvement in symptoms till now  MRI with Progression of extensive demyelinating disease as above, with approximately 7 enhancing lesions  Continue IV solumedrol 1000mg IV daily, current plan is for 3 days but may need 5 days. Will leave decision to neurology  Likely will need to be started to immunomodulators as an OP  Neurology is on the case    Dehydration  D/c IVF  Current elevation in BUN/Cr ratio is related to steroids     Prior hx of diabetes (? gestational)   Check A1C  Continue SSI as on steroids     Code Status: Full Code  DVT Prophylaxis: Lovenox  GI Prophylaxis: not indicated      Subjective: Pt seen and examined at bedside. NAD, vision disturbance seems to be the same since admission. Overnight events d/w RN     CHIEF COMPLAINT: \"I had trouble with vision again\"     Review of Systems:  Symptom Y/N Comments  Symptom Y/N Comments   Fever/Chills n   Chest Pain n    Poor Appetite    Edema     Cough n   Abdominal Pain n    Sputum    Joint Pain     SOB/BERNSTEIN n   Pruritis/Rash     Nausea/vomit    Tolerating PT/OT     Diarrhea    Tolerating Diet y    Constipation    Other       Could NOT obtain due to:      Objective:     VITALS:   Last 24hrs VS reviewed since prior progress note.  Most recent are:  Patient Vitals for the past 24 hrs:   Temp Pulse Resp BP SpO2   17 1102 97.7 °F (36.5 °C) 74 16 102/53 100 %   17 0752 98.2 °F (36.8 °C) 66 16 112/57 98 %   17 0351 98.2 °F (36.8 °C) 62 16 102/60 98 %   17 2341 98 °F (36.7 °C) 71 16 94/55 98 %   17 1926 98.5 °F (36.9 °C) 69 16 105/58 100 %   17 1643 98.1 °F (36.7 °C) 63 18 96/57 100 %   17 1200 - - - 94/50 100 %       Intake/Output Summary (Last 24 hours) at 17 1152  Last data filed at 17 0351   Gross per 24 hour   Intake          2401.66 ml   Output 0 ml   Net          2401.66 ml        PHYSICAL EXAM:  General: WD, WN. Alert, cooperative, no acute distress    EENT:  EOMI. Left eye blurry vision, Anicteric sclerae. MMM  Resp:  CTA bilaterally, no wheezing or rales. No accessory muscle use  CV:  Regular  rhythm,  No edema  GI:  Soft, Non distended, Non tender.  +Bowel sounds  Neurologic:  Alert and oriented X 3, normal speech,   Psych:   Good insight. Not anxious nor agitated  Skin:  No rashes. No jaundice    Reviewed most current lab test results and cultures  YES  Reviewed most current radiology test results   YES  Review and summation of old records today    NO  Reviewed patient's current orders and MAR    YES  PMH/SH reviewed - no change compared to H&P  ________________________________________________________________________  Care Plan discussed with:    Comments   Patient y    Family      RN y    Care Manager     Consultant                        Multidiciplinary team rounds were held today with , nursing, pharmacist and clinical coordinator. Patient's plan of care was discussed; medications were reviewed and discharge planning was addressed. ________________________________________________________________________  Total NON critical care TIME:  35  Minutes    Total CRITICAL CARE TIME Spent:   Minutes non procedure based      Comments   >50% of visit spent in counseling and coordination of care     ________________________________________________________________________  Gaye Lima MD     Procedures: see electronic medical records for all procedures/Xrays and details which were not copied into this note but were reviewed prior to creation of Plan. LABS:  I reviewed today's most current labs and imaging studies.   Pertinent labs include:  Recent Labs      06/07/17   0002   WBC  6.3   HGB  11.4*   HCT  34.6*   PLT  216     Recent Labs      06/08/17   0403  06/07/17   0447  06/07/17   0002   NA   --   139  141   K   --   3.5  3.8 CL   --   108  106   CO2   --   27  30   GLU   --   83  95   BUN   --   18  18   CREA   --   0.68  0.75   CA   --   8.1*  8.9   MG  2.0   --    --    ALB   --    --   3.5   TBILI   --    --   0.3   SGOT   --    --   14*   ALT   --    --   24       Signed: Irene Auguste MD

## 2017-06-08 NOTE — PROCEDURES
[facility]  *** FINAL REPORT ***    Name: Melvinia Hamman  MRN: [id number]    Inpatient  : 1985  HIS Order #: [id number]  [address] [facility] Visit #: [id number]  Date: 2017    TYPE OF TEST: Cerebrovascular Duplex    REASON FOR TEST  Visual loss  left eye    Right Carotid:-             Proximal               Mid                 Distal  cm/s  Systolic  Diastolic  Systolic  Diastolic  Systolic  Diastolic  CCA:    521.4      19.0                           122.0      16.0  Bulb:  ECA:     63.0  ICA:     78.0                 80.0                 72.0  ICA/CCA:  0.6    ICA Stenosis: Normal    Right Vertebral:-  Finding: Antegrade  Sys:       33.0  Mitzi:    Right Subclavian: Normal    Left Carotid:-            Proximal                Mid                 Distal  cm/s  Systolic  Diastolic  Systolic  Diastolic  Systolic  Diastolic  CCA:    919.2      18.0                           141.0      17.0  Bulb:  ECA:     58.0  ICA:     66.0                 60.0                 63.0  ICA/CCA:  0.5    ICA Stenosis: Normal    Left Vertebral:-  Finding: Antegrade  Sys:       75.0  Mitzi:    Left Subclavian: Normal    INTERPRETATION/FINDINGS  PROCEDURE:  Carotid Duplex Examination using B-mode, color and  spectral Doppler of the extracranial cerebrovascular arteries. 1. No evidence of significant arterial occlusive disease in the  internal carotid arteries. 2. No significant stenosis in the external carotid arteries  bilaterally. 3. Antegrade flow in both vertebral arteries. 4. Normal flow in both subclavian arteries. ADDITIONAL COMMENTS    I have personally reviewed the data relevant to the interpretation of  this  study. TECHNOLOGIST: Verner Pun. Perrino, RVT, RDMS  Signed: 2017 10:02 AM    PHYSICIAN: Edvin Warren MD  Signed: 2017 07:13 PM

## 2017-06-08 NOTE — PROGRESS NOTES
Order received and acknowledged. Nursing cleared for mobilization. Patient was received in standing. Upon entering room patient was standing and talking on cell phone. Patient performed standing marches and able to bend down and  objects off the floor without loss of balance. Nursing clarified that patient had no issues with balance or coordination. Pt has been mobilizing independently. No services needed. Will sign off.

## 2017-06-08 NOTE — PROGRESS NOTES
Spiritual Care Partner Volunteer visited patient on 6/8/17. Documented by:    Demetrio Ibarra M.S.   Spiritual Care Department  If needs arise please call Angelita-KRIS ([phone])

## 2017-06-08 NOTE — ROUTINE PROCESS
Bedside and Verbal shift change report given to [address] [place] [address] (oncoming nurse) by Enid Vargas RN(offgoing nurse). Report included the following information SBAR, Kardex, Intake/Output and MAR.     Zone Phone: [phone]        Significant changes during shift: none           Patient Information     Asael Mijares  33 y.o.  2017 10:32 PM by Samantha Mc MD. Asael Mijares was admitted from Home     Problem List          Patient Active Problem List     Diagnosis Date Noted    Multiple sclerosis exacerbation ([facility] 2017    S/P  10/17/2013    Demyelinating disease of central nervous system ([facility] 2011    Sinusitis 2011    Optic neuropathy, left - recurrent 2011           Past Medical History:   Diagnosis Date    Asthma      Gastrointestinal disorder       reflux    Multiple sclerosis ([facility]                 Activity Status:     OOB to Chair Yes  Ambulated this shift Yes   Bed Rest No              Patient Safety:     Falls Score Total Score: 1  Safety Level_______  Bed Alarm On? No  Sitter?  No     Plan for upcoming shift: safety        Discharge Plan: No      Active Consults:  IP CONSULT TO NEUROLOGY  IP CONSULT TO NEUROLOGY

## 2017-06-08 NOTE — PROGRESS NOTES
Bedside and Verbal shift change report given to Tram Wooten (oncoming nurse) by ZEFERINO Jeffers RN (offgoing nurse). Report given with SBAR, Kardex, Intake/Output, MAR and Recent Results.

## 2017-06-09 ENCOUNTER — TELEPHONE (OUTPATIENT)
Dept: INTERNAL MEDICINE CLINIC | Age: 32
End: 2017-06-09

## 2017-06-09 ENCOUNTER — TELEPHONE (OUTPATIENT)
Dept: NEUROLOGY | Age: 32
End: 2017-06-09

## 2017-06-09 VITALS
HEART RATE: 61 BPM | WEIGHT: 123.46 LBS | RESPIRATION RATE: 18 BRPM | BODY MASS INDEX: 21.08 KG/M2 | SYSTOLIC BLOOD PRESSURE: 102 MMHG | OXYGEN SATURATION: 100 % | HEIGHT: 64 IN | TEMPERATURE: 98.6 F | DIASTOLIC BLOOD PRESSURE: 59 MMHG

## 2017-06-09 LAB
ACE SERPL-CCNC: 39 U/L (ref 14–82)
ANA SER QL: POSITIVE
ANTICHROMATIN ABS, ACHRLT: <0.2 AI (ref 0–0.9)
AQP4 H2O CHANNEL AB SERPL IA-ACNC: 1.8 U/ML (ref 0–3)
DSDNA AB SER-ACNC: <1 IU/ML (ref 0–9)
ENA SM AB SER-ACNC: <0.2 AI (ref 0–0.9)
ENA SM+RNP AB SER-ACNC: <0.2 AI (ref 0–0.9)
ENA SS-A AB SER-ACNC: <0.2 AI (ref 0–0.9)
ENA SS-B AB SER-ACNC: 1 AI (ref 0–0.9)
GLUCOSE BLD STRIP.AUTO-MCNC: 139 MG/DL (ref 65–100)
GLUCOSE BLD STRIP.AUTO-MCNC: 189 MG/DL (ref 65–100)
IGA SERPL-MCNC: 283 MG/DL (ref 87–352)
IGG SERPL-MCNC: 1454 MG/DL (ref 700–1600)
IGM SERPL-MCNC: 116 MG/DL (ref 26–217)
PROT PATTERN SERPL IFE-IMP: NORMAL
RNP ABS, RNPRLT: 0.2 AI (ref 0–0.9)
SEE BELOW:, 164879: ABNORMAL
SERVICE CMNT-IMP: ABNORMAL
SERVICE CMNT-IMP: ABNORMAL

## 2017-06-09 PROCEDURE — 74011250637 HC RX REV CODE- 250/637: Performed by: INTERNAL MEDICINE

## 2017-06-09 PROCEDURE — 74011250636 HC RX REV CODE- 250/636: Performed by: INTERNAL MEDICINE

## 2017-06-09 PROCEDURE — 74011000258 HC RX REV CODE- 258: Performed by: INTERNAL MEDICINE

## 2017-06-09 PROCEDURE — 82962 GLUCOSE BLOOD TEST: CPT

## 2017-06-09 RX ORDER — PREDNISONE 10 MG/1
TABLET ORAL
Qty: 20 TAB | Refills: 0 | Status: SHIPPED | OUTPATIENT
Start: 2017-06-09 | End: 2017-06-12 | Stop reason: CLARIF

## 2017-06-09 RX ADMIN — SODIUM CHLORIDE 1000 MG: 900 INJECTION, SOLUTION INTRAVENOUS at 03:19

## 2017-06-09 RX ADMIN — PANTOPRAZOLE SODIUM 40 MG: 40 TABLET, DELAYED RELEASE ORAL at 07:30

## 2017-06-09 RX ADMIN — CETIRIZINE HYDROCHLORIDE 20 MG: 10 TABLET, FILM COATED ORAL at 08:58

## 2017-06-09 RX ADMIN — Medication 10 ML: at 03:20

## 2017-06-09 NOTE — ROUTINE PROCESS
Bedside and Verbal shift change report given to [facility] (oncoming nurse) by Jena Roberts RN(offgoing nurse). Report included the following information SBAR, Kardex, Intake/Output and MAR.      Zone Phone: [phone]          Significant changes during shift: none              Patient Information  Sendy Grewal Hams  33 y.o.  2017 10:32 PM by Mis Finn MD. Noel Kruger was admitted from Home      Problem List              Patient Active Problem List      Diagnosis Date Noted    Multiple sclerosis exacerbation ([facility] 2017    S/P  10/17/2013    Demyelinating disease of central nervous system ([facility] 2011    Sinusitis 2011    Optic neuropathy, left - recurrent 2011               Past Medical History:   Diagnosis Date    Asthma       Gastrointestinal disorder         reflux    Multiple sclerosis ([facility]                      Activity Status:      OOB to Chair Yes  Ambulated this shift Yes   Bed Rest No                  Patient Safety:      Falls Score Total Score: 1  Safety Level_______  Bed Alarm On? No  Sitter?  No      Plan for upcoming shift: safety, finish steroids today and send home on medrol dosepak.          Discharge Plan: yes in progress    Active Consults:  IP CONSULT TO NEUROLOGY  IP CONSULT TO NEUROLOGY

## 2017-06-09 NOTE — TELEPHONE ENCOUNTER
Patient needs a call to schedule a Hospital Follow Up appt for [id number] discharge on today 6/9/17 for 1 week from today please. Please call.  Thank you

## 2017-06-09 NOTE — PROGRESS NOTES
Follow-up appointments have been scheduled for Dr. Lita Osler for June 12 at 8:30am and Dr. Candi Jean Baptiste for June 12 at 12:00pm

## 2017-06-09 NOTE — TELEPHONE ENCOUNTER
Patient's mom says Dr. Cecilia Richards told her to come in Monday at noon. She isn't scheduled until July. Please advise. Also, she needs to know what to do about work. They need a date that she will be returning. [facility] follow up) Please call.

## 2017-06-09 NOTE — DISCHARGE SUMMARY
Hospitalist Discharge Summary     Patient ID:  Asael Mijares  [id number]  55 y.o.  1985    PCP on record: Tamara Yates MD    Admit date: 6/6/2017  Discharge date and time: 6/9/2017      DISCHARGE DIAGNOSIS:  Acute Multiple sclerosis Exacerbation  Elevated blood sugars    CONSULTATIONS:  IP CONSULT TO NEUROLOGY  IP CONSULT TO NEUROLOGY    Excerpted HPI from H&P of Samantha Mc MD:  Franco Rajput is a 33 y.o. female with known history as listed below presents to ED with complaint noted above. Available records were reviewed at the time of H&P. Patient with prior dx of MS by work up with Neurology 2005 and further possible dx of \"viral optic neuritis\" whom received steroids for left eye vision changes last in 2011. She has not seen Neurology OP since 2005. She was to follow with Neurolgoy OP on 7/24/2017. She reports having lasix surgery 1yr ago and tolerated well. She reports further change to left eye with blurring of vision x 2 days. No halos. No central clearing. She notes no ha/change in swallow, weakness. No chest pain/pressure/sob. No LH. No new medications, travel, sick contacts. Neurology was called from ED by MD - they wish IP evaluation. We were asked to admit for work up and evaluation of the above problems. ______________________________________________________________________  DISCHARGE SUMMARY/HOSPITAL COURSE:  for full details see H&P, daily progress notes, labs, consult notes. Acute Multiple sclerosis Exacerbation (presented with blurry vision in left eye)  No improvement in symptoms till now  D/w neurology symptoms may or may note improve.  She receive 3 days of high dose IV steroids and can go home on tapering dose of steroids  MRI with Progression of extensive demyelinating disease as above, with approximately 7 enhancing lesions  Plan to start immunomodulators on coming Monday as an OP by neurology  Neurology is on the case     Dehydration  D/rhonda IVF  Current elevation in BUN/Cr ratio is related to steroids      Prior hx of diabetes (? gestational)   Not sure why RN didn't draw A1C  Blood sugars mildly elevated which could be related to steroids. Recommend checking A1C as an OP  _______________________________________________________________________  Patient seen and examined by me on discharge day. Pertinent Findings:  Gen:    Not in distress  Chest: Clear lungs  CVS:   Regular rhythm. No edema  Abd:  Soft, not distended, not tender  Neuro:  Alert, non focal except slight blurry vision on left eye  _______________________________________________________________________  DISCHARGE MEDICATIONS:   Current Discharge Medication List      START taking these medications    Details   predniSONE (DELTASONE) 10 mg tablet 4 tabs daily for 2 days   3 tabs daily for 2 days   2 tabs daily for 2 days   1 tab   daily for 2 days  Qty: 20 Tab, Refills: 0         CONTINUE these medications which have NOT CHANGED    Details   omeprazole (PRILOSEC) 20 mg capsule Take 1 Cap by mouth daily. Qty: 30 Cap, Refills: 0      NORGESTIMATE-ETHINYL ESTRADIOL (ORTHO TRI-CYCLEN, 28, PO) Take  by mouth. cetirizine (ZYRTEC) 10 mg tablet Take 20 mg by mouth. albuterol (PROVENTIL, VENTOLIN) 90 mcg/Actuation inhaler Take 1 Puff by inhalation every six (6) hours as needed. fluticasone (FLONASE) 50 mcg/Actuation nasal spray 2 Sprays by Nasal route daily. Administer to [facility] NASAL each/right/left nostril:[id number] nostril. My Recommended Diet, Activity, Wound Care, and follow-up labs are listed in the patient's Discharge Insturctions which I have personally completed and reviewed.     _______________________________________________________________________  DISPOSITION:    Home with Family: y   Home with HH/PT/OT/RN:    SNF/LTC:    LIZ:    OTHER:        Condition at Discharge:  Stable  _______________________________________________________________________  Follow up with:   PCP : Clarissa Beavers MD  Follow-up Information     Follow up With Details Comments Contact Info    Eitan Champion MD On 6/12/2017 He talked to you about appointment time [address]   [facility] [address] [facility] [address]      Clarissa Beavers MD Schedule an appointment as soon as possible for a visit in 1 week also ask to check Hb A1C [id number] [facility]  [phone]                Total time in minutes spent coordinating this discharge (includes going over instructions, follow-up, prescriptions, and preparing report for sign off to her PCP) : 35 minutes    Signed:  Arpan Tony MD

## 2017-06-09 NOTE — PROGRESS NOTES
Discharge instructions including followup appointments and new medications and side effects reviewed with patient. She verbalized understanding.

## 2017-06-09 NOTE — PROGRESS NOTES
CM met with pt and she was aware of the discharge. Pt's mother at the bedside and will transport pt home. F/u appointments made and documented on the discharge paperwork. Care Management Interventions  PCP Verified by CM: Yes (Dr. Desi Thibodeaux - saw her 3 years ago)  Mode of Transport at Discharge:  Other (see comment) (pt's mother will transport by car)  Hospital Transport Time of Discharge: 1100  Transition of Care Consult (CM Consult): Discharge Planning  Discharge Durable Medical Equipment: No  Physical Therapy Consult: Yes  Occupational Therapy Consult: No  Speech Therapy Consult: No  Current Support Network: Lives with Spouse, Own Home (lives with her family in a 1 story home with 4 steps to the entrance)  Confirm Follow Up Transport: Family  Plan discussed with Pt/Family/Caregiver: Yes  Discharge [facility]  [facility] [address] [facility] [address]

## 2017-06-09 NOTE — DISCHARGE INSTRUCTIONS
HOSPITALIST DISCHARGE INSTRUCTIONS    NAME: Damien Watson   :  1985   MRN:  [id number]     Date/Time:  2017 9:30 AM    ADMIT DATE: 2017     DISCHARGE DATE: 2017     DISCHARGE DIAGNOSIS:  Acute Multiple sclerosis Exacerbation  Elevated blood sugars    MEDICATIONS:  · It is important that you take the medication exactly as they are prescribed. · Keep your medication in the bottles provided by the pharmacist and keep a list of the medication names, dosages, and times to be taken in your wallet. · Do not take other medications without consulting your doctor. Pain Management: per above medications    What to do at Home    Recommended diet:  Resume previous diet    Recommended activity: Activity as tolerated    If you have questions regarding the hospital related prescriptions or hospital related issues please call [facility] at [phone]. If you experience any of the following symptoms then please call your primary care physician or return to the emergency room if you cannot get hold of your doctor:  Fever, chills, nausea, vomiting, diarrhea, change in mentation, falling, bleeding, shortness of breath        Information obtained by :  I understand that if any problems occur once I am at home I am to contact my physician. I understand and acknowledge receipt of the instructions indicated above.                                                                                                                                            Physician's or R.N.'s Signature                                                                  Date/Time                                                                                                                                              Patient or Representative Signature                                                          Date/Time

## 2017-06-09 NOTE — PROGRESS NOTES
Neurology Progress Note    Patient ID:  Osbaldo Guillermo  [id number]  33 y.o.  1985      CHIEF COMPLAINT: Vision loss    Subjective:      Patient has complaints vision loss in the left eye that is better on the steroids. Patient's MRI scan of the brain and cervical spine showed multiple new lesions with some enhancement indicating very active disease. Discussed her diagnosis with the  and the patient in detail, 45 minutes spent with the patient, 30 minutes of which spent counseling the patient and her  about her diagnosis, prognosis, treatment options and further therapy and progression and prognosis of her disease. They will come to the office next week and decide on disease modifying drugs. She actually has been getting much worse when listening to the , more cognitive issues, more gait issues and balance, motor coordination problems at the patient tries to hide.     Current Facility-Administered Medications   Medication Dose Route Frequency    enoxaparin (LOVENOX) injection 30 mg  30 mg SubCUTAneous Q24H    cetirizine (ZYRTEC) tablet 20 mg  20 mg Oral DAILY    pantoprazole (PROTONIX) tablet 40 mg  40 mg Oral ACB    albuterol-ipratropium (DUO-NEB) 2.5 MG-0.5 MG/3 ML  3 mL Nebulization Q2H PRN    insulin lispro (HUMALOG) injection   SubCUTAneous AC&HS    glucose chewable tablet 16 g  4 Tab Oral PRN    glucagon (GLUCAGEN) injection 1 mg  1 mg IntraMUSCular PRN    sodium chloride (NS) flush 5-10 mL  5-10 mL IntraVENous Q8H    sodium chloride (NS) flush 5-10 mL  5-10 mL IntraVENous PRN    dextrose 10 % infusion 125-250 mL  125-250 mL IntraVENous PRN    methylPREDNISolone ((Solu-MEDROL) 1,000 mg in 0.9% sodium chloride (MBP/ADV) 100 mL  1 g IntraVENous Q24H    LORazepam (ATIVAN) 2 mg/mL injection 1-2 mg  1-2 mg IntraVENous Q6H PRN    sodium chloride (NS) flush 5-10 mL  5-10 mL IntraVENous Q8H    sodium chloride (NS) flush 5-10 mL  5-10 mL IntraVENous PRN        Past Medical History:   Diagnosis Date    Asthma     Gastrointestinal disorder     reflux    Multiple sclerosis ([facility]        Past Surgical History:   Procedure Laterality Date    HX GYN  10/2013    c section    HX HEENT      tonsils, adenoids, PE tubes       [unfilled]    Social History   Substance Use Topics    Smoking status: Never Smoker    Smokeless tobacco: Not on file    Alcohol use Yes      Comment: Rare       Current Facility-Administered Medications   Medication Dose Route Frequency Provider Last Rate Last Dose    enoxaparin (LOVENOX) injection 30 mg  30 mg SubCUTAneous Q24H Celia Valencia MD   30 mg at 06/08/17 1238    cetirizine (ZYRTEC) tablet 20 mg  20 mg Oral DAILY Renetta Hayes MD   20 mg at 06/08/17 0851    pantoprazole (PROTONIX) tablet 40 mg  40 mg Oral ACB Renetta Hayes MD   40 mg at 06/08/17 0849    albuterol-ipratropium (DUO-NEB) 2.5 MG-0.5 MG/3 ML  3 mL Nebulization Q2H PRN Renetta Hayes MD        insulin lispro (HUMALOG) injection   SubCUTAneous AC&HS Renetta Hayes MD   2 Units at 06/08/17 1626    glucose chewable tablet 16 g  4 Tab Oral PRN Renetta Hayes MD        glucagon (GLUCAGEN) injection 1 mg  1 mg IntraMUSCular PRN Renetta Hayes MD        sodium chloride (NS) flush 5-10 mL  5-10 mL IntraVENous Q8H Renetta Hayes MD   10 mL at 06/08/17 1318    sodium chloride (NS) flush 5-10 mL  5-10 mL IntraVENous PRN Renetta Hayes MD        dextrose 10 % infusion 125-250 mL  125-250 mL IntraVENous PRN Yanci Liang MD        methylPREDNISolone ((Solu-MEDROL) 1,000 mg in 0.9% sodium chloride (MBP/ADV) 100 mL  1 g IntraVENous Q24H Celia Valencia  mL/hr at 06/08/17 0210 1,000 mg at 06/08/17 0210    LORazepam (ATIVAN) 2 mg/mL injection 1-2 mg  1-2 mg IntraVENous Q6H PRN Juaquin Quiles MD        sodium chloride (NS) flush 5-10 mL  5-10 mL IntraVENous Q8H Yanci Liang MD   10 mL at 06/08/17 1318    sodium chloride (NS) flush 5-10 mL  5-10 mL IntraVENous PRN Yanci Sam MD Allergies   Allergen Reactions    Sulfa (Sulfonamide Antibiotics) Hives       Review of Systems:    A comprehensive review of systems was negative except for: Musculoskeletal: positive for myalgias, arthralgias and stiff joints  Neurological: positive for memory problems, paresthesia, coordination problems, gait problems, weakness and Vision and memory problems  Behvioral/Psych: positive for anxiety and depression    Objective:      Objective:     Patient Vitals for the past 24 hrs:   BP Temp Pulse Resp SpO2   06/08/17 2027 98/58 98.4 °F (36.9 °C) 62 16 97 %   06/08/17 1459 104/52 98.5 °F (36.9 °C) 79 16 100 %   06/08/17 1102 102/53 97.7 °F (36.5 °C) 74 16 100 %   06/08/17 0752 112/57 98.2 °F (36.8 °C) 66 16 98 %   06/08/17 0351 102/60 98.2 °F (36.8 °C) 62 16 98 %   06/07/17 2341 94/55 98 °F (36.7 °C) 71 16 98 %         Lab Review   Recent Results (from the past 24 hour(s))   VITAMIN B12    Collection Time: 06/08/17  4:03 AM   Result Value Ref Range    Vitamin B12 874 211 - 911 pg/mL   FOLATE    Collection Time: 06/08/17  4:03 AM   Result Value Ref Range    Folate 9.1 5.0 - 21.0 ng/mL   SED RATE (ESR)    Collection Time: 06/08/17  4:03 AM   Result Value Ref Range    Sed rate, automated 5 0 - 20 mm/hr   RPR    Collection Time: 06/08/17  4:03 AM   Result Value Ref Range    RPR NONREACTIVE NR     MAGNESIUM    Collection Time: 06/08/17  4:03 AM   Result Value Ref Range    Magnesium 2.0 1.6 - 2.4 mg/dL   CK    Collection Time: 06/08/17  4:03 AM   Result Value Ref Range    CK 66 26 - 192 U/L   TSH 3RD GENERATION    Collection Time: 06/08/17  4:03 AM   Result Value Ref Range    TSH 0.42 0.36 - 3.74 uIU/mL   GLUCOSE, POC    Collection Time: 06/08/17  6:53 AM   Result Value Ref Range    Glucose (POC) 165 (H) 65 - 100 mg/dL    Performed by [place], POC    Collection Time: 06/08/17 11:09 AM   Result Value Ref Range    Glucose (POC) 170 (H) 65 - 100 mg/dL    Performed by Ha Escobar (PCT)    GLUCOSE, POC Collection Time: 06/08/17  4:04 PM   Result Value Ref Range    Glucose (POC) 242 (H) 65 - 100 mg/dL    Performed by Dominique Rose (PCT)    GLUCOSE, POC    Collection Time: 06/08/17  9:11 PM   Result Value Ref Range    Glucose (POC) 134 (H) 65 - 100 mg/dL    Performed by [facility] David Sommer            Additional comments:I personally viewed and interpreted the patient's MRI of the brain and cervical spine reviewed personally on the PACS system today in detail        NEUROLOGICAL EXAM:    Appearance: The patient is well developed, well nourished, provides a coherent history and is in no acute distress. Mental Status: Oriented to time, place and person and the president, cognitive function and fund of knowledge is normal. Speech is fluent without aphasia or dysarthria. Mood and affect appropriate. Cranial Nerves:   Intact visual fields. Fundi are benign. Visual acuity is 20/40 in the left eye and 20/20 in the right eye with no Shashank Layne pupil. CLEMENTE, EOM's full, no nystagmus, no ptosis. Facial sensation is normal. Corneal reflexes are not tested. Facial movement is symmetric. Hearing is normal bilaterally. Palate is midline with normal sternocleidomastoid and trapezius muscles are normal. Tongue is midline. Neck without meningismus or bruits   Motor:  5/5 strength in upper and lower proximal and distal muscles. Normal bulk and tone. No fasciculations. Reflexes:   Deep tendon reflexes 2+/4 and symmetrical.  No babinski or clonus present   Sensory:   Normal to touch, pinprick and temperature and vibration. DSS is intact   Gait:  Abnormal gait with patient mildly unsteady . Tremor:   No tremor noted. Cerebellar:  No cerebellar signs present. Neurovascular:  Normal heart sounds and regular rhythm, peripheral pulses intact, and no carotid bruits. Assessment:        Assessment:       ICD-10-CM ICD-9-CM    1. Exacerbation of multiple sclerosis ([facility] G35 340    2.  Left optic neuritis H46.9 377.30    3. Multiple sclerosis exacerbation ([facility] G35 340    4. Multiple sclerosis, relapsing-remitting (HCC) G35 340    5. Demyelinating disease of central nervous system (HCC) G37.9 341.9    6.  Optic neuropathy, left H46.9 377.39      Active Problems:    Multiple sclerosis exacerbation ([facility] (6/7/2017)      Multiple sclerosis, relapsing-remitting ([facility] (6/7/2017)      Exacerbation of multiple sclerosis ([facility] (6/7/2017)      Left optic neuritis (6/7/2017)        Plan:     Patient has multiple sclerosis on MRI scan that is very active in both brain and cervical spine  Patient will need disease modifying drugs, these were all discussed with the patient and her  today in detail and time spent one half hour counseling them today and 15 minutes examining them  Finish steroids tomorrow and sent home on Medrol Dosepak and office follow-up Monday or Tuesday next week to pick disease modifying drug      Signed:  Sara Whittaker MD  6/8/2017  10:18 PM    Min Singh MD  [phone]

## 2017-06-10 NOTE — PROGRESS NOTES
Neurology Progress Note    Patient ID:  Susi Ngo  [id number]  33 y.o.  1985      CHIEF COMPLAINT: Vision loss    Subjective:      Patient has complaints vision loss in the left eye that is better on the steroids. Patient concerned because she still has some blurred vision in the left eye, and we explained to her again that it takes time to get better and sometimes the improvement is not complete. She has to be patient. We will see her in the office next Monday at noon to pick out disease modifying drug. Patient does have active disease so we need a good medication. Patient's MRI scan of the brain and cervical spine showed multiple new lesions with some enhancement indicating very active disease. Discussed her condition with the patient and her mother at length. They will come to the office next week and decide on disease modifying drugs. She actually has been getting much worse when listening to the , more cognitive issues, more gait issues and balance, motor coordination problems at the patient tries to hide. We advised the patient to stay out of work for 2 weeks in the interim, we see how much better she gets.          Past Medical History:   Diagnosis Date    Asthma     Gastrointestinal disorder     reflux    Multiple sclerosis ([address]        Past Surgical History:   Procedure Laterality Date    HX GYN  10/2013    c section    HX HEENT      tonsils, adenoids, PE tubes       [unfilled]    Social History   Substance Use Topics    Smoking status: Never Smoker    Smokeless tobacco: Not on file    Alcohol use Yes      Comment: Rare           Allergies   Allergen Reactions    Sulfa (Sulfonamide Antibiotics) Hives       Review of Systems:    A comprehensive review of systems was negative except for: Musculoskeletal: positive for myalgias, arthralgias and stiff joints  Neurological: positive for memory problems, paresthesia, coordination problems, gait problems, weakness and Vision and memory problems  Behvioral/Psych: positive for anxiety and depression    Objective:      Objective:     Patient Vitals for the past 24 hrs:   BP Temp Pulse Resp SpO2   06/09/17 1126 102/59 98.6 °F (37 °C) 61 18 100 %   06/09/17 0724 106/57 98.3 °F (36.8 °C) (!) 58 18 97 %   06/09/17 0330 94/68 98 °F (36.7 °C) (!) 53 18 98 %   06/09/17 0009 91/53 98 °F (36.7 °C) (!) 55 16 97 %   06/08/17 2027 98/58 98.4 °F (36.9 °C) 62 16 97 %         Lab Review   Recent Results (from the past 24 hour(s))   GLUCOSE, POC    Collection Time: 06/08/17  9:11 PM   Result Value Ref Range    Glucose (POC) 134 (H) 65 - 100 mg/dL    Performed by Jhon Meyers, POC    Collection Time: 06/09/17  6:28 AM   Result Value Ref Range    Glucose (POC) 139 (H) 65 - 100 mg/dL    Performed by [place], POC    Collection Time: 06/09/17 11:11 AM   Result Value Ref Range    Glucose (POC) 189 (H) 65 - 100 mg/dL    Performed by Mame Miller (PCT)            Additional comments:I personally viewed and interpreted the patient's MRI of the brain and cervical spine reviewed personally on the PACS system today in detail        NEUROLOGICAL EXAM:    Appearance: The patient is well developed, well nourished, provides a coherent history and is in no acute distress. Mental Status: Oriented to time, place and person and the president, cognitive function and fund of knowledge is normal. Speech is fluent without aphasia or dysarthria. Mood and affect appropriate. Cranial Nerves:   Intact visual fields. Fundi show mild optic atrophy, worse on the left side. Visual acuity is 20/40 in the left eye and 20/20 in the right eye with no Shashank Layne pupil. CLEMENTE, EOM's full, no nystagmus, no ptosis. Facial sensation is normal. Corneal reflexes are not tested. Facial movement is symmetric. Hearing is normal bilaterally. Palate is midline with normal sternocleidomastoid and trapezius muscles are normal. Tongue is midline.   Neck without meningismus or bruits   Motor:  5/5 strength in upper and lower proximal and distal muscles. Normal bulk and tone. No fasciculations. Reflexes:   Deep tendon reflexes 2+/4 and symmetrical.  No babinski or clonus present   Sensory:   Normal to touch, pinprick and temperature and vibration. DSS is intact   Gait:  Abnormal gait with patient mildly unsteady . Tremor:   No tremor noted. Cerebellar:  No cerebellar signs present. Neurovascular:  Normal heart sounds and regular rhythm, peripheral pulses intact, and no carotid bruits. Assessment:        Assessment:       ICD-10-CM ICD-9-CM    1. Exacerbation of multiple sclerosis ([address] G35 340    2. Left optic neuritis H46.9 377.30    3. Multiple sclerosis exacerbation ([address] G35 340    4. Multiple sclerosis, relapsing-remitting (HCC) G35 340    5. Demyelinating disease of central nervous system (HCC) G37.9 341.9    6. Optic neuropathy, left H46.9 377.39    7. Cerebral microvascular disease I67.9 437.9    8. Stenosis of both internal carotid arteries I65.23 433.10      433.30      Active Problems:    Multiple sclerosis exacerbation ([address] (6/7/2017)      Multiple sclerosis, relapsing-remitting ([address] (6/7/2017)      Exacerbation of multiple sclerosis ([address] (6/7/2017)      Left optic neuritis (6/7/2017)      Stenosis of both internal carotid arteries (6/8/2017)      Cerebral microvascular disease (6/8/2017)        Plan: We counseled the patient and her mother again at length that her vision may take time to get better and sometimes recovery is incomplete, but all we can do is try the steroids and see what happens  Disease modifying drugs will not improve her clinical state, just prevent it from getting worse and he went over that again in detail with the patient 15 minutes today.   Also talked 10 minutes to the hospitalist about follow-up and discharging her on Medrol Dosepak and summarizing her care and plans, and we scheduled her for appointment at noon on Monday  Patient has multiple sclerosis on MRI scan that is very active in both brain and cervical spine  30 minutes spent today arranging all this in counseling the patient and examining her and talking to her treating doctors about therapy and plans  We will see her again on Monday in the office and she will call if any problem in the interim.   She is to remain out of work for 2 weeks    Signed:  Clifford Ladd MD  6/9/2017  10:18 PM    Ana M Frost MD  [phone]

## 2017-06-11 LAB
25(OH)D2 SERPL-MCNC: 1.6 NG/ML
25(OH)D3 SERPL-MCNC: 12 NG/ML
25(OH)D3+25(OH)D2 SERPL-MCNC: 14 NG/ML

## 2017-06-12 ENCOUNTER — OFFICE VISIT (OUTPATIENT)
Dept: INTERNAL MEDICINE CLINIC | Age: 32
End: 2017-06-12

## 2017-06-12 ENCOUNTER — OFFICE VISIT (OUTPATIENT)
Dept: NEUROLOGY | Age: 32
End: 2017-06-12

## 2017-06-12 VITALS
BODY MASS INDEX: 21.51 KG/M2 | DIASTOLIC BLOOD PRESSURE: 48 MMHG | HEIGHT: 64 IN | RESPIRATION RATE: 12 BRPM | SYSTOLIC BLOOD PRESSURE: 110 MMHG | OXYGEN SATURATION: 99 % | HEART RATE: 90 BPM | WEIGHT: 126 LBS

## 2017-06-12 VITALS
TEMPERATURE: 98.1 F | DIASTOLIC BLOOD PRESSURE: 67 MMHG | WEIGHT: 125.2 LBS | HEIGHT: 64 IN | RESPIRATION RATE: 16 BRPM | HEART RATE: 66 BPM | SYSTOLIC BLOOD PRESSURE: 105 MMHG | BODY MASS INDEX: 21.37 KG/M2 | OXYGEN SATURATION: 97 %

## 2017-06-12 DIAGNOSIS — G35 MULTIPLE SCLEROSIS, RELAPSING-REMITTING (HCC): ICD-10-CM

## 2017-06-12 DIAGNOSIS — G35 EXACERBATION OF MULTIPLE SCLEROSIS (HCC): ICD-10-CM

## 2017-06-12 DIAGNOSIS — H46.9 LEFT OPTIC NEURITIS: ICD-10-CM

## 2017-06-12 DIAGNOSIS — G37.9 DEMYELINATING DISEASE OF CENTRAL NERVOUS SYSTEM (HCC): Primary | ICD-10-CM

## 2017-06-12 DIAGNOSIS — Z00.00 PHYSICAL EXAM: Primary | ICD-10-CM

## 2017-06-12 DIAGNOSIS — G35 MULTIPLE SCLEROSIS EXACERBATION (HCC): ICD-10-CM

## 2017-06-12 RX ORDER — GLATIRAMER 40 MG/ML
40 INJECTION, SOLUTION SUBCUTANEOUS
Qty: 36 SYRINGE | Refills: 11
Start: 2017-06-12 | End: 2018-04-04 | Stop reason: CLARIF

## 2017-06-12 RX ORDER — CHOLECALCIFEROL (VITAMIN D3) 125 MCG
CAPSULE ORAL
COMMUNITY
End: 2017-12-21

## 2017-06-12 RX ORDER — MULTIVIT WITH MINERALS/HERBS
1 TABLET ORAL DAILY
COMMUNITY
End: 2017-12-21

## 2017-06-12 NOTE — PROGRESS NOTES
HISTORY OF PRESENT ILLNESS  Ana Lilia Maguire is a 33 y.o. female. HPI   Last seen 3/25/14. Pt is here today to follow up on chronic conditions. Pt was hospitalized last week and dx'd with MS that presented with blurry and double vision. Reviewed brain MRI (17): Progression of extensive demyelinating disease as above, with approximately 7 enhancing lesions. Reviewed Dr. Dora Ardon hospital note (17): Patient has multiple sclerosis on MRI scan that is very active in both brain and cervical spine  Patient will need disease modifying drugs, these were all discussed with the patient and her  today in detail and time spent one half hour counseling them today and 15 minutes examining them  Finish steroids tomorrow and sent home on Medrol Dosepak and office follow-up Monday or Tuesday next week to pick disease modifying drug  She was started on prednisone in the hospital.  She is scheduled to see Dr. Dora Ardon (neuro) today. She reports her vision problems have not resolved. BP today is 105/67. No longer taking prilosec, no breakthrough sxs    Continues on zyrtec OTC daily for hives    Continues on OCP    Reviewed labs   Negative for sarcoid, TSH nl, B12 nl, kidney nl, mild anemia    Reviewed mri    Weight is 125 lbs  Wt is in the normal range. She exercises regularly by going to dance classes.     PMHx:  MS  Reflux  Asthma    PSHx:  Tonsillectomy      Family Hx:  Mother - DM, HTN, CKD, CAD  Father - colon cancer (age 76)    Social Hx:  Nonsmoker  Occasional alcohol use    1 child  Employed    PREVENTIVE:  Colonoscopy: not yet need  Pap:  at a clinic, regular ob/gyn - Dr. Yojana Alvarez at [address]: not yet needed  Dexa: not yet need  Tdap:   Flu shot: 2016  Eye exam: due  Lipids: ordered today 17  EK17    Patient Active Problem List    Diagnosis Date Noted    Stenosis of both internal carotid arteries 2017    Cerebral microvascular disease 2017    Multiple sclerosis exacerbation ([facility] 2017    Multiple sclerosis, relapsing-remitting ([facility] 2017    Exacerbation of multiple sclerosis ([facility] 2017    Left optic neuritis 2017    S/P  10/17/2013    Demyelinating disease of central nervous system ([facility] 2011    Sinusitis 2011    Optic neuropathy, left - recurrent 2011     Current Outpatient Prescriptions   Medication Sig Dispense Refill    predniSONE (DELTASONE) 10 mg tablet 4 tabs daily for 2 days   3 tabs daily for 2 days   2 tabs daily for 2 days   1 tab   daily for 2 days 20 Tab 0    omeprazole (PRILOSEC) 20 mg capsule Take 1 Cap by mouth daily. 30 Cap 0    NORGESTIMATE-ETHINYL ESTRADIOL (ORTHO TRI-CYCLEN, 28, PO) Take  by mouth.  cetirizine (ZYRTEC) 10 mg tablet Take 20 mg by mouth.  fluticasone (FLONASE) 50 mcg/Actuation nasal spray 2 Sprays by Nasal route daily. Administer to right and left nostril.  albuterol (PROVENTIL, VENTOLIN) 90 mcg/Actuation inhaler Take 1 Puff by inhalation every six (6) hours as needed.        Past Surgical History:   Procedure Laterality Date    HX GYN  10/2013    c section    HX HEENT      tonsils, adenoids, PE tubes      Lab Results  Component Value Date/Time   WBC 6.3 2017 12:02 AM   HGB 11.4 2017 12:02 AM   HCT 34.6 2017 12:02 AM   PLATELET 634  12:02 AM   MCV 92.3 2017 12:02 AM       Lab Results  Component Value Date/Time   Cholesterol, total 133 2011 03:55 AM   HDL Cholesterol 58 2011 03:55 AM   LDL, calculated 68.2 2011 03:55 AM   Triglyceride 34 2011 03:55 AM   CHOL/HDL Ratio 2.3 2011 03:55 AM       Lab Results  Component Value Date/Time   GFR est AA >60 2017 04:47 AM   GFR est non-AA >60 2017 04:47 AM   Creatinine 0.68 2017 04:47 AM   BUN 18 2017 04:47 AM   Sodium 139 2017 04:47 AM   Potassium 3.5 2017 04:47 AM Chloride 108 06/07/2017 04:47 AM   CO2 27 06/07/2017 04:47 AM         Review of Systems   Constitutional: Negative for chills and fever. HENT: Negative for hearing loss. Eyes: Positive for blurred vision and double vision. Respiratory: Negative for shortness of breath and wheezing. Cardiovascular: Negative for chest pain and palpitations. Gastrointestinal: Negative for nausea and vomiting. Genitourinary: Negative for dysuria and urgency. Musculoskeletal: Negative for back pain and falls. Skin: Negative for itching and rash. Neurological: Negative for dizziness, loss of consciousness and headaches. Psychiatric/Behavioral: Negative for depression. The patient is not nervous/anxious. Physical Exam   Constitutional: She is oriented to person, place, and time. She appears well-developed and well-nourished. No distress. HENT:   Head: Normocephalic and atraumatic. Right Ear: External ear normal.   Left Ear: External ear normal.   Mouth/Throat: Oropharynx is clear and moist. No oropharyngeal exudate. Eyes: Conjunctivae and EOM are normal. Pupils are equal, round, and reactive to light. Right eye exhibits no discharge. Left eye exhibits no discharge. Neck: Normal range of motion. Neck supple. No carotid bruits. Cardiovascular: Normal rate, regular rhythm, normal heart sounds and intact distal pulses. Exam reveals no gallop and no friction rub. No murmur heard. Pulmonary/Chest: Effort normal and breath sounds normal. No respiratory distress. She has no wheezes. She has no rales. She exhibits no tenderness. Abdominal: Soft. She exhibits no distension and no mass. There is no tenderness. There is no rebound and no guarding. Musculoskeletal: Normal range of motion. She exhibits no edema, tenderness or deformity. Lymphadenopathy:     She has no cervical adenopathy. Neurological: She is alert and oriented to person, place, and time.  Coordination normal.   Skin: Skin is warm and dry. No rash noted. She is not diaphoretic. No erythema. No pallor. Psychiatric: She has a normal mood and affect. Her behavior is normal.       ASSESSMENT and PLAN    ICD-10-CM ICD-9-CM    1. Physical exam--bp and wt nl, labs ordered, eye due , pap utd, colo at age 37 d/t fhx colon cancer. Z00.00 V70.9 CBC W/O DIFF      LIPID PANEL   2. Multiple sclerosis, relapsing-remitting (HCC)--has f/u bernard rae today  G35 340     Depression screen reviewed and negative    Written by Claudia Figueroa, as dictated by Chris Serrano MD.     Current diagnosis and concerns discussed with pt at length. Understands risks and benefits or current treatment plan and medications and accepts the treatment and medication with any possible risks.   Pt asks appropriate questions which were answered.   Pt instructed to call with any concerns or problems.

## 2017-06-12 NOTE — PROGRESS NOTES
Germaine Ivy is a 33 y.o. female  Chief Complaint   Patient presents with   [facility] Follow Up     Recent MS diagnosis     1. Have you been to an emergency room, urgent clinic, or hospitalized since your last visit? YES  If yes, where when, and reason for visit? Admitted 6/6/17: New diagnosis of MS    2. Have seen or consulted any other health care provider since your last visit? NO  Please include any pap smears or colon screening in this section  If yes, where when, and reason for visit? 6. Do you have an Advanced Directive/ Living Will in place?  NO  If yes, do we have a copy on file NO  If no, would you like information NO

## 2017-06-12 NOTE — LETTER
6/12/2017 9:04 PM 
 
Patient:  Rae Dowling YOB: 1985 Date of Visit: 6/12/2017 Dear No Recipients: Thank you for referring Ms. Rae Dowling to me for evaluation/treatment. Below are the relevant portions of my assessment and plan of care. Neurology Progress Note Patient ID: 
Rae Dowling [id number] 
33 y.o. 
1985 CHIEF COMPLAINT: Blurred vision in the left eye Subjective:  
  
Patient has complaints of blurred vision in the left eye still, and her visual acuity was slightly better today with a visual acuity of 20/30 in the left eye and 20/20 in the right eye. She was recently just treated in the hospital for optic neuritis, and had an abnormal MRI scan of the brain and the cervical spine showing demyelinating lesions some of which were enhancing in both the brain and the cervical spine. The patient had a previous workup in 2011 with abnormal spinal fluid suggesting demyelinating disease with elevated IgG synthesis rate, oligoclonal bands and myelin basic protein, and an abnormal MRI of the brain and cervical spine then, but she never returned for follow-up. She also had a previous episode in 2005 when I saw her 12 years ago, at that time her workup was negative for any signs of MS. She is better, and able to walk without assistive devices, but her  does say she is at times clumsy and seems more forgetful. She comes in today to decide on further treatment and evaluation, and after long discussion about the safety and efficacy of the medications and her desire may be to get pregnant again she decided on Copaxone because she liked the safety profile better. I was leaning more toward more aggressive therapy, but she does not want that until she tries the safer medications. She gives herself allergy shots and does not think you have any trouble with the injections.   She has had no recent other new neurological symptoms. She had a borderline elevated troponins antibody test of 1, when 0.9 is upper limits of normal.  Her vitamin D level was low and she is to start a B complex vitamin and 2000 units of vitamin D every day. 35 minutes spent with the patient in the office today, going over all the different medications, giving her information to read on them, advising her of the risks, benefits and side effects of all the different medications. He also discuss it with her  who is on speaker phone throughout the whole visit. Past Medical History:  
Diagnosis Date  Asthma  Asthma  Gastrointestinal disorder   
 reflux  Multiple sclerosis ([address] Past Surgical History:  
Procedure Laterality Date  HX GYN  10/2013  
 c section  HX HEENT    
 tonsils, adenoids, PE tubes [unfilled] Social History Substance Use Topics  Smoking status: Never Smoker  Smokeless tobacco: Not on file  Alcohol use Yes Comment: Rare Allergies Allergen Reactions  Sulfa (Sulfonamide Antibiotics) Hives Review of Systems: A comprehensive review of systems was negative except for: Constitutional: positive for fatigue and malaise Eyes: positive for visual disturbance Neurological: positive for coordination problems, gait problems, weakness and Visual loss Objective: 
 
 
Objective:  
 
@IPVITALS(24:)@ Lab Review No results found for this or any previous visit (from the past 24 hour(s)). Additional comments:I personally viewed and interpreted the patient's MRI scans reviewed personally on the PACS system today, and they were shown to the patient NEUROLOGICAL EXAM: 
 
Appearance: The patient is well developed, well nourished, provides a coherent history and is in no acute distress.   
Mental Status: Oriented to time, place and person and the president, cognitive function and fund of knowledge is normal. Speech is fluent without aphasia or dysarthria. Mood and affect appropriate. Cranial Nerves:   Intact visual fields. Fundi are positive and they show optic pallor bilaterally left greater than right. Visual acuity is 20/30 in the left eye with near vision, and 20/20 in the right eye. CLEMENTE, EOM's full, no nystagmus, no ptosis. Facial sensation is normal. Corneal reflexes are not tested. Facial movement is symmetric. Hearing is normal bilaterally. Palate is midline with normal sternocleidomastoid and trapezius muscles are normal. Tongue is midline. Neck without meningismus or bruits Temporal arteries are not tender or enlarged Motor:  5/5 strength in upper and lower proximal and distal muscles. Normal bulk and tone. No fasciculations. Reflexes:   Deep tendon reflexes 2+/4 and symmetrical. 
No babinski or clonus present Sensory:   Normal to touch, pinprick and temperature and vibration and temperature. DSS is intact Gait:  Normal gait. Tremor:   No tremor noted. Cerebellar:  No cerebellar signs present. Neurovascular:  Normal heart sounds and regular rhythm, peripheral pulses intact, and no carotid bruits. Assessment: 
 
 
 
Assessment: ICD-10-CM ICD-9-CM 1. Demyelinating disease of central nervous system ([facility] G37.9 341.9 b complex vitamins (B COMPLEX 1) tablet  
   cholecalciferol, vitamin D3, (VITAMIN D3) 2,000 unit tab  
   glatiramer (COPAXONE) 40 mg/mL injection 2. Multiple sclerosis exacerbation (HCC) G35 340 b complex vitamins (B COMPLEX 1) tablet  
   cholecalciferol, vitamin D3, (VITAMIN D3) 2,000 unit tab  
   glatiramer (COPAXONE) 40 mg/mL injection 3. Multiple sclerosis, relapsing-remitting (HCC) G35 340 b complex vitamins (B COMPLEX 1) tablet  
   cholecalciferol, vitamin D3, (VITAMIN D3) 2,000 unit tab  
   glatiramer (COPAXONE) 40 mg/mL injection 4. Exacerbation of multiple sclerosis ([facility] G35 340 b complex vitamins (B COMPLEX 1) tablet cholecalciferol, vitamin D3, (VITAMIN D3) 2,000 unit tab  
   glatiramer (COPAXONE) 40 mg/mL injection 5. Left optic neuritis H46.9 377.30 b complex vitamins (B COMPLEX 1) tablet  
   cholecalciferol, vitamin D3, (VITAMIN D3) 2,000 unit tab  
   glatiramer (COPAXONE) 40 mg/mL injection Plan:  
 
Patient after long discussion about the risk and benefits of all the different medications, has decided that she would like to take Copaxone 35 minutes spent with the patient 20 minutes of which was counseling the patient and all the different medications and discussing their efficacy and safety and risk and benefits We also advised the patient in detail of the side effects of Copaxone, and how to manage them, and monitoring needed. We will see her again in 3 months time or earlier as needed. We discussed her condition with her  on speaker phone also today throughout the visit. Signed: 
Dario Chery MD 
6/12/2017 
8:51 PM 
 
[address] This note will not be viewable in [address] If you have questions, please do not hesitate to call me. I look forward to following Ms. Jacobs Stager along with you. Sincerely, Dario Chery MD

## 2017-06-12 NOTE — LETTER
6/12/2017 9:00 PM 
 
Patient:  Lianna Foster YOB: 1985 Date of Visit: 6/12/2017 Dear No Recipients: Thank you for referring Ms. Lianna Foster to me for evaluation/treatment. Below are the relevant portions of my assessment and plan of care. Neurology Progress Note Patient ID: 
Lianna Foster [id number] 
33 y.o. 
1985 CHIEF COMPLAINT: Blurred vision in the left eye Subjective:  
  
Patient has complaints of blurred vision in the left eye still, and her visual acuity was slightly better today with a visual acuity of 20/30 in the left eye and 20/20 in the right eye. She was recently just treated in the hospital for optic neuritis, and had an abnormal MRI scan of the brain and the cervical spine showing demyelinating lesions some of which were enhancing in both the brain and the cervical spine. The patient had a previous workup in 2011 with abnormal spinal fluid suggesting demyelinating disease with elevated IgG synthesis rate, oligoclonal bands and myelin basic protein, and an abnormal MRI of the brain and cervical spine then, but she never returned for follow-up. She also had a previous episode in 2005 when I saw her 12 years ago, at that time her workup was negative for any signs of MS. She is better, and able to walk without assistive devices, but her  does say she is at times clumsy and seems more forgetful. She comes in today to decide on further treatment and evaluation, and after long discussion about the safety and efficacy of the medications and her desire may be to get pregnant again she decided on Copaxone because she liked the safety profile better. I was leaning more toward more aggressive therapy, but she does not want that until she tries the safer medications. She gives herself allergy shots and does not think you have any trouble with the injections.   She has had no recent other new neurological symptoms. She had a borderline elevated troponins antibody test of 1, when 0.9 is upper limits of normal.  Her vitamin D level was low and she is to start a B complex vitamin and 2000 units of vitamin D every day. 35 minutes spent with the patient in the office today, going over all the different medications, giving her information to read on them, advising her of the risks, benefits and side effects of all the different medications. He also discuss it with her  who is on speaker phone throughout the whole visit. Current Outpatient Prescriptions Medication Sig  b complex vitamins (B COMPLEX 1) tablet Take 1 Tab by mouth daily.  cholecalciferol, vitamin D3, (VITAMIN D3) 2,000 unit tab Take  by mouth.  glatiramer (COPAXONE) 40 mg/mL injection 40 mg by SubCUTAneous route every Monday, Wednesday, Friday.  NORGESTIMATE-ETHINYL ESTRADIOL (ORTHO TRI-CYCLEN, 28, PO) Take  by mouth.  cetirizine (ZYRTEC) 10 mg tablet Take 20 mg by mouth.  albuterol (PROVENTIL, VENTOLIN) 90 mcg/Actuation inhaler Take 1 Puff by inhalation every six (6) hours as needed.  fluticasone (FLONASE) 50 mcg/Actuation nasal spray 2 Sprays by Nasal route daily. Administer to {[facility] RX NASAL each/right/left nostril:24793} nostril. No current facility-administered medications for this visit. Past Medical History:  
Diagnosis Date  Asthma  Asthma  Gastrointestinal disorder   
 reflux  Multiple sclerosis ([address] Past Surgical History:  
Procedure Laterality Date  HX GYN  10/2013  
 c section  HX HEENT    
 [facility] Social History Substance Use Topics  Smoking status: Never Smoker  Smokeless tobacco: Not on file  Alcohol use Yes Comment: Rare Current Outpatient Prescriptions Medication Sig Dispense Refill  b complex vitamins (B COMPLEX 1) tablet Take 1 Tab by mouth daily.  cholecalciferol, vitamin D3, (VITAMIN D3) 2,000 unit tab Take  by mouth.  glatiramer (COPAXONE) 40 mg/mL injection 40 mg by SubCUTAneous route every Monday, Wednesday, Friday. 36 Syringe 11  
 NORGESTIMATE-ETHINYL ESTRADIOL (ORTHO TRI-CYCLEN, 28, PO) Take  by mouth.  cetirizine (ZYRTEC) 10 mg tablet Take 20 mg by mouth.  albuterol (PROVENTIL, VENTOLIN) 90 mcg/Actuation inhaler Take 1 Puff by inhalation every six (6) hours as needed.  fluticasone (FLONASE) 50 mcg/Actuation nasal spray 2 Sprays by Nasal route daily. Administer to {[facility] each/right/left nostril:51432} nostril. Allergies Allergen Reactions  Sulfa (Sulfonamide Antibiotics) Hives Review of Systems: A comprehensive review of systems was negative except for: Constitutional: positive for fatigue and malaise Eyes: positive for visual disturbance Neurological: positive for coordination problems, gait problems, weakness and Visual loss Objective: 
 
 
Objective:  
 
@IPVITALS(24:)@ Lab Review No results found for this or any previous visit (from the past 24 hour(s)). Additional comments:I personally viewed and interpreted the patient's MRI scans reviewed personally on the PACS system today, and they were shown to the patient NEUROLOGICAL EXAM: 
 
Appearance: The patient is well developed, well nourished, provides a coherent history and is in no acute distress. Mental Status: Oriented to time, place and person and the president, cognitive function and fund of knowledge is normal. Speech is fluent without aphasia or dysarthria. Mood and affect appropriate. Cranial Nerves:   Intact visual fields. Fundi are positive and they show optic pallor bilaterally left greater than right. Visual acuity is 20/30 in the left eye with near vision, and 20/20 in the right eye. CLEMENTE, EOM's full, no nystagmus, no ptosis.  Facial sensation is normal. Corneal reflexes are not tested. Facial movement is symmetric. Hearing is normal bilaterally. Palate is midline with normal sternocleidomastoid and trapezius muscles are normal. Tongue is midline. Neck without meningismus or bruits Temporal arteries are not tender or enlarged Motor:  5/5 strength in upper and lower proximal and distal muscles. Normal bulk and tone. No fasciculations. Reflexes:   Deep tendon reflexes 2+/4 and symmetrical. 
No babinski or clonus present Sensory:   Normal to touch, pinprick and temperature and vibration and temperature. DSS is intact Gait:  Normal gait. Tremor:   No tremor noted. Cerebellar:  No cerebellar signs present. Neurovascular:  Normal heart sounds and regular rhythm, peripheral pulses intact, and no carotid bruits. Assessment: 
 
 
 
Assessment: ICD-10-CM ICD-9-CM 1. Demyelinating disease of central nervous system ([facility] G37.9 341.9 b complex vitamins (B COMPLEX 1) tablet  
   cholecalciferol, vitamin D3, (VITAMIN D3) 2,000 unit tab  
   glatiramer (COPAXONE) 40 mg/mL injection 2. Multiple sclerosis exacerbation (HCC) G35 340 b complex vitamins (B COMPLEX 1) tablet  
   cholecalciferol, vitamin D3, (VITAMIN D3) 2,000 unit tab  
   glatiramer (COPAXONE) 40 mg/mL injection 3. Multiple sclerosis, relapsing-remitting (HCC) G35 340 b complex vitamins (B COMPLEX 1) tablet  
   cholecalciferol, vitamin D3, (VITAMIN D3) 2,000 unit tab  
   glatiramer (COPAXONE) 40 mg/mL injection 4. Exacerbation of multiple sclerosis ([facility] G35 340 b complex vitamins (B COMPLEX 1) tablet  
   cholecalciferol, vitamin D3, (VITAMIN D3) 2,000 unit tab  
   glatiramer (COPAXONE) 40 mg/mL injection 5. Left optic neuritis H46.9 377.30 b complex vitamins (B COMPLEX 1) tablet  
   cholecalciferol, vitamin D3, (VITAMIN D3) 2,000 unit tab  
   glatiramer (COPAXONE) 40 mg/mL injection Plan:  
 
Patient after long discussion about the risk and benefits of all the different medications, has decided that she would like to take Copaxone 35 minutes spent with the patient 20 minutes of which was counseling the patient and all the different medications and discussing their efficacy and safety and risk and benefits We also advised the patient in detail of the side effects of Copaxone, and how to manage them, and monitoring needed. We will see her again in 3 months time or earlier as needed. We discussed her condition with her  on speaker phone also today throughout the visit. Signed: 
Nela Gerard MD 
6/12/2017 
8:51 PM 
 
Marco Haile 69 This note will not be viewable in [address] If you have questions, please do not hesitate to call me. I look forward to following Ms. Danie Parisi along with you. Sincerely, Nela Gerard MD

## 2017-06-12 NOTE — MR AVS SNAPSHOT
Visit Information Date & Time Provider Department Dept. Phone Encounter #  
 6/12/2017  8:30 AM Min Singh, [address] [phone] [id number] Follow-up Instructions Return in about 1 year (around 6/12/2018). Your Appointments 6/12/2017 12:00 PM  
Follow Up with Sara Whittaker MD  
Neurology Clinic at [facility] [address] [facility]) [address] [facility], 
[address] [place] [address], [address] [address]  
[address], [address], [address] [address]  
  
    
 8/7/2017  1:30 PM  
ROUTINE CARE with Min Singh, [address] [place] [address],[address] [facility]) Appt Note: routine f/u  
 [facility] [address] [address]  
[address] [facility] [address] [place] [address] Upcoming Health Maintenance Date Due DTaP/Tdap/Td series (1 - Tdap) 1/9/2007 INFLUENZA AGE 9 TO ADULT 8/1/2017 PAP AKA CERVICAL CYTOLOGY 1/16/2020 Allergies as of 6/12/2017  Review Complete On: 6/12/2017 By: Leigh REZA Rash, LPN Severity Noted Reaction Type Reactions Sulfa (Sulfonamide Antibiotics)  08/30/2011    Hives Current Immunizations  Reviewed on 6/12/2017 Name Date Influenza Vaccine 10/10/2016, 9/1/2013 Reviewed by Min Singh MD on 6/12/2017 at  9:42 AM  
 Reviewed by Min Singh MD on 6/12/2017 at  9:42 AM  
You Were Diagnosed With   
  
 Codes Comments Physical exam    -  Primary ICD-10-CM: Z00.00 ICD-9-CM: V70.9 Vitals BP Pulse Temp Resp Height(growth percentile) Weight(growth percentile) 105/67 (BP 1 Location: Left arm, BP Patient Position: Sitting) 66 98.1 °F (36.7 °C) (Oral) 16 5' 4\" (1.626 m) 125 lb 3.2 oz (56.8 kg) SpO2 BMI OB Status Smoking Status 97% 21.49 kg/m2 Having regular periods Never Smoker BMI and BSA Data Body Mass Index Body Surface Area 21.49 kg/m 2 1.6 m 2 Preferred Pharmacy Pharmacy Name Phone [facility] [address], [facility] [address] [phone] Your Updated Medication List  
  
   
This list is accurate as of: 6/12/17  9:49 AM.  Always use your most recent med list.  
  
  
  
  
 albuterol 90 mcg/actuation inhaler Commonly known as:  Richar Parker Take 1 Puff by inhalation every six (6) hours as needed. FLONASE 50 mcg/actuation nasal spray Generic drug:  fluticasone 2 Sprays by Nasal route daily. Administer to {[facility] RX NASAL each/right/left nostril:69259} nostril. ORTHO TRI-CYCLEN (28) PO Take  by mouth.  
  
 predniSONE 10 mg tablet Commonly known as:  DELTASONE  
4 tabs daily for 2 days  3 tabs daily for 2 days  2 tabs daily for 2 days  1 tab   daily for 2 days ZyrTEC 10 mg tablet Generic drug:  cetirizine Take 20 mg by mouth. Follow-up Instructions Return in about 1 year (around 6/12/2018). Patient Instructions Schedule eye exam 
 
Lab today Introducing [facility] & HEALTH SERVICES! [facility] introduces HiConversion patient portal. Now you can access parts of your medical record, email your doctor's office, and request medication refills online. 1. In your internet browser, go to [email or web address] 2. Click on the First Time User? Click Here link in the Sign In box. You will see the New Member Sign Up page. 3. Enter your HiConversion Access Code exactly as it appears below. You will not need to use this code after youve completed the sign-up process. If you do not sign up before the expiration date, you must request a new code. · HiConversion Access Code: [email or web address] Expires: 9/6/2017 11:14 AM 
 
4. Enter the last four digits of your Social Security Number (xxxx) and Date of Birth (mm/dd/yyyy) as indicated and click Submit.  You will be taken to the next sign-up page. 5. Create a Qbix ID. This will be your Qbix login ID and cannot be changed, so think of one that is secure and easy to remember. 6. Create a Qbix password. You can change your password at any time. 7. Enter your Password Reset Question and Answer. This can be used at a later time if you forget your password. 8. Enter your e-mail address. You will receive e-mail notification when new information is available in [address] 9. Click Sign Up. You can now view and download portions of your medical record. 10. Click the Download Summary menu link to download a portable copy of your medical information. If you have questions, please visit the Frequently Asked Questions section of the Qbix website. Remember, Qbix is NOT to be used for urgent needs. For medical emergencies, dial 911. Now available from your iPhone and Android! Please provide this summary of care documentation to your next provider. Your primary care clinician is listed as Eduardo Fong. If you have any questions after today's visit, please call [phone].

## 2017-06-12 NOTE — MR AVS SNAPSHOT
Visit Information Date & Time Provider Department Dept. Phone Encounter #  
 6/12/2017 12:00 PM Sharon Newman MD Neurology Clinic at [facility] [phone] [id number] Follow-up Instructions Return in about 3 months (around 9/12/2017). Your Appointments 8/7/2017  1:30 PM  
ROUTINE CARE with Constantin Mills MD  
[facility] [address] [facility]) Appt Note: routine f/u  
 [place] [address] [facility] [address]  
[address] [facility] [address] [place] [address] Upcoming Health Maintenance Date Due DTaP/Tdap/Td series (1 - Tdap) 1/9/2007 INFLUENZA AGE 9 TO ADULT 8/1/2017 PAP AKA CERVICAL CYTOLOGY 1/16/2020 Allergies as of 6/12/2017  Review Complete On: 6/12/2017 By: Sharon Newman MD  
  
 Severity Noted Reaction Type Reactions Sulfa (Sulfonamide Antibiotics)  08/30/2011    Hives Current Immunizations  Reviewed on 6/12/2017 Name Date Influenza Vaccine 10/10/2016, 9/1/2013 Reviewed by Constantin Mills MD on 6/12/2017 at  9:42 AM  
 Reviewed by Constantin Mills MD on 6/12/2017 at  9:42 AM  
You Were Diagnosed With   
  
 Codes Comments Demyelinating disease of central nervous system ([facility]    -  Primary ICD-10-CM: G37.9 ICD-9-CM: 341.9 Multiple sclerosis exacerbation ([facility]     ICD-10-CM: G35 
ICD-9-CM: 159 Multiple sclerosis, relapsing-remitting ([facility]     ICD-10-CM: G35 
ICD-9-CM: 311 Exacerbation of multiple sclerosis ([facility]     ICD-10-CM: G35 
ICD-9-CM: 716 Left optic neuritis     ICD-10-CM: H46.9 ICD-9-CM: 377.30 Vitals BP Pulse Resp Height(growth percentile) Weight(growth percentile) SpO2  
 110/48 90 12 5' 4\" (1.626 m) 126 lb (57.2 kg) 99% BMI OB Status Smoking Status 21.63 kg/m2 Having regular periods Never Smoker Vitals History BMI and BSA Data Body Mass Index Body Surface Area 21.63 kg/m 2 1.61 m 2 Preferred Pharmacy Pharmacy Name Phone [facility] [address], [facility] [address] [phone] Your Updated Medication List  
  
   
This list is accurate as of: 6/12/17 12:50 PM.  Always use your most recent med list.  
  
  
  
  
 albuterol 90 mcg/actuation inhaler Commonly known as:  Grant Allen Take 1 Puff by inhalation every six (6) hours as needed. B COMPLEX 1 tablet Generic drug:  b complex vitamins Take 1 Tab by mouth daily. FLONASE 50 mcg/actuation nasal spray Generic drug:  fluticasone 2 Sprays by Nasal route daily. Administer to {[facility] RX NASAL each/right/left nostril:67039} nostril.  
  
 glatiramer 40 mg/mL injection Commonly known as:  COPAXONE 40 mg by SubCUTAneous route every Monday, Wednesday, Friday. ORTHO TRI-CYCLEN (28) PO Take  by mouth. VITAMIN D3 2,000 unit Tab Generic drug:  cholecalciferol (vitamin D3) Take  by mouth. ZyrTEC 10 mg tablet Generic drug:  cetirizine Take 20 mg by mouth. Follow-up Instructions Return in about 3 months (around 9/12/2017). Patient Instructions A Healthy Lifestyle: Care Instructions Your Care Instructions A healthy lifestyle can help you feel good, stay at a healthy weight, and have plenty of energy for both work and play. A healthy lifestyle is something you can share with your whole family. A healthy lifestyle also can lower your risk for serious health problems, such as high blood pressure, heart disease, and diabetes. You can follow a few steps listed below to improve your health and the health of your family. Follow-up care is a key part of your treatment and safety. Be sure to make and go to all appointments, and call your doctor if you are having problems. Its also a good idea to know your test results and keep a list of the medicines you take. How can you care for yourself at home? · Do not eat too much sugar, fat, or fast foods. You can still have dessert and treats now and then. The goal is moderation. · Start small to improve your eating habits. Pay attention to portion sizes, drink less juice and soda pop, and eat more fruits and vegetables. ¨ Eat a healthy amount of food. A 3-ounce serving of meat, for example, is about the size of a deck of cards. Fill the rest of your plate with vegetables and whole grains. ¨ Limit the amount of soda and sports drinks you have every day. Drink more water when you are thirsty. ¨ Eat at least 5 servings of fruits and vegetables every day. It may seem like a lot, but it is not hard to reach this goal. A serving or helping is 1 piece of fruit, 1 cup of vegetables, or 2 cups of leafy, raw vegetables. Have an apple or some carrot sticks as an afternoon snack instead of a candy bar. Try to have fruits and/or vegetables at every meal. 
· Make exercise part of your daily routine. You may want to start with simple activities, such as walking, bicycling, or slow swimming. Try to be active 30 to 60 minutes every day. You do not need to do all 30 to 60 minutes all at once. For example, you can exercise 3 times a day for 10 or 20 minutes. Moderate exercise is safe for most people, but it is always a good idea to talk to your doctor before starting an exercise program. 
· Keep moving. Yvrose Fails the lawn, work in the garden, or Presstler. Take the stairs instead of the elevator at work. · If you smoke, quit. People who smoke have an increased risk for heart attack, stroke, cancer, and other lung illnesses. Quitting is hard, but there are ways to boost your chance of quitting tobacco for good. ¨ Use nicotine gum, patches, or lozenges. ¨ Ask your doctor about stop-smoking programs and medicines. ¨ Keep trying.  
In addition to reducing your risk of diseases in the future, you will notice some benefits soon after you stop using tobacco. If you have shortness of breath or asthma symptoms, they will likely get better within a few weeks after you quit. · Limit how much alcohol you drink. Moderate amounts of alcohol (up to 2 drinks a day for men, 1 drink a day for women) are okay. But drinking too much can lead to liver problems, high blood pressure, and other health problems. Family health If you have a family, there are many things you can do together to improve your health. · Eat meals together as a family as often as possible. · Eat healthy foods. This includes fruits, vegetables, lean meats and dairy, and whole grains. · Include your family in your fitness plan. Most people think of activities such as jogging or tennis as the way to fitness, but there are many ways you and your family can be more active. Anything that makes you breathe hard and gets your heart pumping is exercise. Here are some tips: 
¨ Walk to do errands or to take your child to school or the bus. ¨ Go for a family bike ride after dinner instead of watching TV. Where can you learn more? Go to http://elizabethHavsjo Delikatesserchuck.info/. Enter R548 in the search box to learn more about \"A Healthy Lifestyle: Care Instructions. \" Current as of: July 26, 2016 Content Version: 11.2 © 9623-7277 [employer]. Care instructions adapted under license by [employer] (which disclaims liability or warranty for this information). If you have questions about a medical condition or this instruction, always ask your healthcare professional. [place] [address] any warranty or liability for your use of this information. Introducing [facility] & HEALTH SERVICES! Gabby Warren introduces [employer] patient portal. Now you can access parts of your medical record, email your doctor's office, and request medication refills online.    
 
1. In your internet browser, go to [email or web address] 2. Click on the First Time User? Click Here link in the Sign In box. You will see the New Member Sign Up page. 3. Enter your Footmarks Access Code exactly as it appears below. You will not need to use this code after youve completed the sign-up process. If you do not sign up before the expiration date, you must request a new code. · Footmarks Access Code: [email or web address] Expires: 9/6/2017 11:14 AM 
 
4. Enter the last four digits of your Social Security Number (xxxx) and Date of Birth (mm/dd/yyyy) as indicated and click Submit. You will be taken to the next sign-up page. 5. Create a Vivense Home & Livingt ID. This will be your Footmarks login ID and cannot be changed, so think of one that is secure and easy to remember. 6. Create a Footmarks password. You can change your password at any time. 7. Enter your Password Reset Question and Answer. This can be used at a later time if you forget your password. 8. Enter your e-mail address. You will receive e-mail notification when new information is available in [address] 9. Click Sign Up. You can now view and download portions of your medical record. 10. Click the Download Summary menu link to download a portable copy of your medical information. If you have questions, please visit the Frequently Asked Questions section of the Footmarks website. Remember, Footmarks is NOT to be used for urgent needs. For medical emergencies, dial 911. Now available from your iPhone and Android! Please provide this summary of care documentation to your next provider. Your primary care clinician is listed as Bita Carballo. If you have any questions after today's visit, please call [phone].

## 2017-06-12 NOTE — PATIENT INSTRUCTIONS

## 2017-06-13 ENCOUNTER — TELEPHONE (OUTPATIENT)
Dept: NEUROLOGY | Age: 32
End: 2017-06-13

## 2017-06-13 LAB
CHOLEST SERPL-MCNC: 150 MG/DL (ref 100–199)
ERYTHROCYTE [DISTWIDTH] IN BLOOD BY AUTOMATED COUNT: 14.5 % (ref 12.3–15.4)
HCT VFR BLD AUTO: 39.5 % (ref 34–46.6)
HDLC SERPL-MCNC: 67 MG/DL
HGB BLD-MCNC: 12.6 G/DL (ref 11.1–15.9)
LDLC SERPL CALC-MCNC: 75 MG/DL (ref 0–99)
MCH RBC QN AUTO: 29.9 PG (ref 26.6–33)
MCHC RBC AUTO-ENTMCNC: 31.9 G/DL (ref 31.5–35.7)
MCV RBC AUTO: 94 FL (ref 79–97)
PLATELET # BLD AUTO: 309 X10E3/UL (ref 150–379)
RBC # BLD AUTO: 4.22 X10E6/UL (ref 3.77–5.28)
TRIGL SERPL-MCNC: 41 MG/DL (ref 0–149)
VLDLC SERPL CALC-MCNC: 8 MG/DL (ref 5–40)
WBC # BLD AUTO: 10.5 X10E3/UL (ref 3.4–10.8)

## 2017-06-13 NOTE — TELEPHONE ENCOUNTER
----- Message from Isidra Celis sent at 6/13/2017  2:48 PM EDT -----  Regarding: Dr. Black/ telephone  Pt would like a callback to discuss any updates on her \"M. S medication\" Best(C)[phone]

## 2017-06-13 NOTE — PROGRESS NOTES
Neurology Progress Note    Patient ID:  Damien Watson  [id number]  32 y.o.  1985      CHIEF COMPLAINT: Blurred vision in the left eye    Subjective:      Patient has complaints of blurred vision in the left eye still, and her visual acuity was slightly better today with a visual acuity of 20/30 in the left eye and 20/20 in the right eye. She was recently just treated in the hospital for optic neuritis, and had an abnormal MRI scan of the brain and the cervical spine showing demyelinating lesions some of which were enhancing in both the brain and the cervical spine. The patient had a previous workup in 2011 with abnormal spinal fluid suggesting demyelinating disease with elevated IgG synthesis rate, oligoclonal bands and myelin basic protein, and an abnormal MRI of the brain and cervical spine then, but she never returned for follow-up. She also had a previous episode in 2005 when I saw her 12 years ago, at that time her workup was negative for any signs of MS. She is better, and able to walk without assistive devices, but her  does say she is at times clumsy and seems more forgetful. She comes in today to decide on further treatment and evaluation, and after long discussion about the safety and efficacy of the medications and her desire may be to get pregnant again she decided on Copaxone because she liked the safety profile better. I was leaning more toward more aggressive therapy, but she does not want that until she tries the safer medications. She gives herself allergy shots and does not think you have any trouble with the injections. She has had no recent other new neurological symptoms. She had a borderline elevated troponins antibody test of 1, when 0.9 is upper limits of normal.  Her vitamin D level was low and she is to start a B complex vitamin and 2000 units of vitamin D every day.   35 minutes spent with the patient in the office today, going over all the different medications, giving her information to read on them, advising her of the risks, benefits and side effects of all the different medications. He also discuss it with her  who is on speaker phone throughout the whole visit. Past Medical History:   Diagnosis Date    Asthma     Asthma     Gastrointestinal disorder     reflux    Multiple sclerosis ([address]        Past Surgical History:   Procedure Laterality Date    HX GYN  10/2013    c section    HX HEENT      tonsils, adenoids, PE tubes       [unfilled]    Social History   Substance Use Topics    Smoking status: Never Smoker    Smokeless tobacco: Not on file    Alcohol use Yes      Comment: Rare           Allergies   Allergen Reactions    Sulfa (Sulfonamide Antibiotics) Hives       Review of Systems:    A comprehensive review of systems was negative except for: Constitutional: positive for fatigue and malaise  Eyes: positive for visual disturbance  Neurological: positive for coordination problems, gait problems, weakness and Visual loss    Objective:      Objective:     @IPVITALS(24:)@      Lab Review No results found for this or any previous visit (from the past 24 hour(s)). Additional comments:I personally viewed and interpreted the patient's MRI scans reviewed personally on the PACS system today, and they were shown to the patient        NEUROLOGICAL EXAM:    Appearance: The patient is well developed, well nourished, provides a coherent history and is in no acute distress. Mental Status: Oriented to time, place and person and the president, cognitive function and fund of knowledge is normal. Speech is fluent without aphasia or dysarthria. Mood and affect appropriate. Cranial Nerves:   Intact visual fields. Fundi are positive and they show optic pallor bilaterally left greater than right. Visual acuity is 20/30 in the left eye with near vision, and 20/20 in the right eye. CLEMENTE, EOM's full, no nystagmus, no ptosis.  Facial sensation is normal. Corneal reflexes are not tested. Facial movement is symmetric. Hearing is normal bilaterally. Palate is midline with normal sternocleidomastoid and trapezius muscles are normal. Tongue is midline. Neck without meningismus or bruits  Temporal arteries are not tender or enlarged    Motor:  5/5 strength in upper and lower proximal and distal muscles. Normal bulk and tone. No fasciculations. Reflexes:   Deep tendon reflexes 2+/4 and symmetrical.  No babinski or clonus present   Sensory:   Normal to touch, pinprick and temperature and vibration and temperature. DSS is intact   Gait:  Normal gait. Tremor:   No tremor noted. Cerebellar:  No cerebellar signs present. Neurovascular:  Normal heart sounds and regular rhythm, peripheral pulses intact, and no carotid bruits. Assessment:        Assessment:       ICD-10-CM ICD-9-CM    1. Demyelinating disease of central nervous system (HCC) G37.9 341.9 b complex vitamins (B COMPLEX 1) tablet      cholecalciferol, vitamin D3, (VITAMIN D3) 2,000 unit tab      glatiramer (COPAXONE) 40 mg/mL injection   2. Multiple sclerosis exacerbation ([facility]) G35 340 b complex vitamins (B COMPLEX 1) tablet      cholecalciferol, vitamin D3, (VITAMIN D3) 2,000 unit tab      glatiramer (COPAXONE) 40 mg/mL injection   3. Multiple sclerosis, relapsing-remitting ([facility]) G35 340 b complex vitamins (B COMPLEX 1) tablet      cholecalciferol, vitamin D3, (VITAMIN D3) 2,000 unit tab      glatiramer (COPAXONE) 40 mg/mL injection   4. Exacerbation of multiple sclerosis ([facility] G35 340 b complex vitamins (B COMPLEX 1) tablet      cholecalciferol, vitamin D3, (VITAMIN D3) 2,000 unit tab      glatiramer (COPAXONE) 40 mg/mL injection   5.  Left optic neuritis H46.9 377.30 b complex vitamins (B COMPLEX 1) tablet      cholecalciferol, vitamin D3, (VITAMIN D3) 2,000 unit tab      glatiramer (COPAXONE) 40 mg/mL injection         Plan:     Patient after long discussion about the risk and benefits of all the different medications, has decided that she would like to take Copaxone  35 minutes spent with the patient 20 minutes of which was counseling the patient and all the different medications and discussing their efficacy and safety and risk and benefits  We also advised the patient in detail of the side effects of Copaxone, and how to manage them, and monitoring needed. We will see her again in 3 months time or earlier as needed. We discussed her condition with her  on speaker phone also today throughout the visit. Signed:  Michael Lucas MD  6/12/2017  8:51 PM    Jovita Lombard, MD  [phone]    This note will not be viewable in [address]

## 2017-06-13 NOTE — TELEPHONE ENCOUNTER
- Prior to hospital admission, patient's most recent office visit at [facility] was 3/25/14; patient needed to re-establish care with [facility].     - Per EMR, patient attended office visit with Dr. Clari Acuña on 6/12/17; was advised to follow-up in one year.

## 2017-06-21 ENCOUNTER — TELEPHONE (OUTPATIENT)
Dept: NEUROLOGY | Age: 32
End: 2017-06-21

## 2017-06-23 NOTE — TELEPHONE ENCOUNTER
[facility] [employer] stated auto ject was shipped and the training nurse in process and the medication was orderd. Copaxone needs a PA.

## 2017-06-28 ENCOUNTER — TELEPHONE (OUTPATIENT)
Dept: NEUROLOGY | Age: 32
End: 2017-06-28

## 2017-06-28 NOTE — TELEPHONE ENCOUNTER
Rec'd auth from [facility] for Copaxone  PA [id number] good until 6/26/2022. Spec pharmacy needs to be changed under med list (shows Froylan). [facility] is [facility] ([facility] changed its name to [facility]). Called [facility] - they have Rx as of yesterday and auth info. Called pt, advised of approval and gave her toll free # to schedule shipment to call tomorrow if she does not get call from [facility]. Ph [phone]    Forward to nurse for revision to [facility].

## 2017-07-14 ENCOUNTER — TELEPHONE (OUTPATIENT)
Dept: NEUROLOGY | Age: 32
End: 2017-07-14

## 2017-07-18 ENCOUNTER — TELEPHONE (OUTPATIENT)
Dept: NEUROLOGY | Age: 32
End: 2017-07-18

## 2017-07-18 NOTE — TELEPHONE ENCOUNTER
I called the patient and had to leave a message for her to call back    There are also questions about her giving herself injections and other things as below.   Unsure what is needed

## 2017-07-18 NOTE — LETTER
7/28/2017 4:59 PM 
 
Ms. 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 She should be excused from that time missed. The patient also because of her disease being exacerbated by warm temperatures, is recommended she not be outside without air conditioning because this causes exacerbation of her disease and disability and she should be excused from activities outside in the hot weather during the summertime because of her illness. She should also not become overfatigued and may need periods of rest periodically because of the lack of exertional tolerance due to her illness. I can be of any further assistance please let me know Sincerely, Juno Alston MD

## 2017-07-18 NOTE — TELEPHONE ENCOUNTER
----- Message from Gian Marinelli sent at 7/18/2017 12:30 PM EDT -----  Regarding: Dr. Rosana Grajeda stated her job needs a more detailed doctors note for her claim. Pt stated there is not enough information regarding why she is out of work, and the clam was denied. Pt would like for the information to be emailed to the address on file, and would like for it to be emailed soon as possible. WR)[phone].

## 2017-07-20 NOTE — TELEPHONE ENCOUNTER
Patient would like a call back regarding more info needing to be sent to insurance company about diagnosis.     Call QL#[phone]

## 2017-07-25 ENCOUNTER — TELEPHONE (OUTPATIENT)
Dept: NEUROLOGY | Age: 32
End: 2017-07-25

## 2017-07-25 NOTE — TELEPHONE ENCOUNTER
----- Message from Dereje Gan sent at 7/25/2017  1:18 PM EDT -----  Regarding: Dr. Mitzi Tyler stated she will lose her job if no one gives her a callback regarding her paperwork for leave of absence. [phone].

## 2017-07-25 NOTE — TELEPHONE ENCOUNTER
----- Message from [facility] sent at 7/24/2017  5:45 PM EDT -----  Regarding: Dr. Kitty Don  Pt received a phone call from the nurse and is returning her call. Pt would like a return phone call . Pt can be reached at ([phone].

## 2017-07-25 NOTE — TELEPHONE ENCOUNTER
I have spoken with the patient and information needed as below:  1. The patient takes her copaxone Monday, Wednesday, Friday. She states that she works from 8:50 to 8:50 (Wednesday, Saturday, and Sunday) and needs a letter excusing her to be able to give the injections to herself. The only day would be Wednesday. 2. She needs a letter stating more allowance for her to be excused from certain activities and that she should be able to excuse herself from any particular situation she feels may give her and exacerbation. She gave an example of they had a function outside she was to go to the other day when it was hot. She states that she gets hives in hot situations (states this happens in the office as well when it's hot inside) and needs to be allowed to excuse herself when these situations arise. 4. They was more details when and why she was out of the office 6/10/17 to 6/27/17. 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 They also wanted to know if this was intermittent or continuous. I asked if she ever worked during that time and she stated no. 5. The form for her FMLA needs to have initials and new signatures when changes are made on it. The corrections made do not have initials beside them.

## 2017-07-25 NOTE — TELEPHONE ENCOUNTER
I called the patient, no answer, left message call back tomorrow but she does not need to give the shot exactly the same time every day she can get the Wednesday dose of Copaxone at night

## 2017-07-25 NOTE — TELEPHONE ENCOUNTER
----- Message from Adán Leigh sent at 7/25/2017  1:34 PM EDT -----  Regarding: Dr. Armand Clarke would like a callback soon as possible regarding her leave of absence paperwork. Pt stated she will lose her job if she does not hear back from someone.  (O)[phone]

## 2017-07-28 NOTE — TELEPHONE ENCOUNTER
Chio Rey, please call patient and see if she wants us to mail this letter and her FMLA form to her or does she want to get up, and also again tell her she can take her Copaxone shot in the evening on Wednesdays, she does not have to take it exactly the same time every day.

## 2017-07-31 NOTE — TELEPHONE ENCOUNTER
Left detailed message that the [facility] paperwork copy and letter are ready for .   The [facility] paperwork will also be faxed for her as well

## 2017-10-06 ENCOUNTER — TELEPHONE (OUTPATIENT)
Dept: INTERNAL MEDICINE CLINIC | Age: 32
End: 2017-10-06

## 2017-10-10 ENCOUNTER — OFFICE VISIT (OUTPATIENT)
Dept: INTERNAL MEDICINE CLINIC | Age: 32
End: 2017-10-10

## 2017-10-10 VITALS
DIASTOLIC BLOOD PRESSURE: 62 MMHG | SYSTOLIC BLOOD PRESSURE: 100 MMHG | HEART RATE: 79 BPM | BODY MASS INDEX: 21.68 KG/M2 | HEIGHT: 64 IN | TEMPERATURE: 98.7 F | WEIGHT: 127 LBS | OXYGEN SATURATION: 96 % | RESPIRATION RATE: 16 BRPM

## 2017-10-10 DIAGNOSIS — M54.50 ACUTE BILATERAL LOW BACK PAIN WITHOUT SCIATICA: Primary | ICD-10-CM

## 2017-10-10 DIAGNOSIS — G35 MULTIPLE SCLEROSIS, RELAPSING-REMITTING (HCC): ICD-10-CM

## 2017-10-10 DIAGNOSIS — Z23 ENCOUNTER FOR IMMUNIZATION: ICD-10-CM

## 2017-10-10 RX ORDER — IBUPROFEN 800 MG/1
800 TABLET ORAL
Qty: 30 TAB | Refills: 0 | Status: SHIPPED | OUTPATIENT
Start: 2017-10-10 | End: 2017-12-21

## 2017-10-10 RX ORDER — CYCLOBENZAPRINE HCL 10 MG
10 TABLET ORAL
Qty: 30 TAB | Refills: 0 | Status: SHIPPED | OUTPATIENT
Start: 2017-10-10 | End: 2017-12-21

## 2017-10-10 NOTE — MR AVS SNAPSHOT
Visit Information Date & Time Provider Department Dept. Phone Encounter #  
 10/10/2017  2:30 PM Manju Fenton, [address] [place] [address],[address] [phone] [id number] Follow-up Instructions Return if symptoms worsen or fail to improve. Upcoming Health Maintenance Date Due DTaP/Tdap/Td series (1 - Tdap) 1/9/2007 INFLUENZA AGE 9 TO ADULT 8/1/2017 PAP AKA CERVICAL CYTOLOGY 1/16/2020 Allergies as of 10/10/2017  Review Complete On: 10/10/2017 By: Manju Fenton MD  
  
 Severity Noted Reaction Type Reactions Sulfa (Sulfonamide Antibiotics)  08/30/2011    Hives Current Immunizations  Reviewed on 6/12/2017 Name Date Influenza Vaccine 10/10/2016, 9/1/2013 Not reviewed this visit You Were Diagnosed With   
  
 Codes Comments Acute bilateral low back pain without sciatica    -  Primary ICD-10-CM: M54.5 ICD-9-CM: 724.2, 338.19 Multiple sclerosis, relapsing-remitting ([facility]     ICD-10-CM: G35 
ICD-9-CM: 940 Vitals BP Pulse Temp Resp Height(growth percentile) Weight(growth percentile) 100/62 (BP 1 Location: Left arm, BP Patient Position: Sitting) 79 98.7 °F (37.1 °C) (Oral) 16 5' 4\" (1.626 m) 127 lb (57.6 kg) SpO2 BMI OB Status Smoking Status 96% 21.8 kg/m2 Having regular periods Never Smoker BMI and BSA Data Body Mass Index Body Surface Area  
 21.8 kg/m 2 1.61 m 2 Preferred Pharmacy Pharmacy Name Phone [facility] [address] [address] [address] [phone] Your Updated Medication List  
  
   
This list is accurate as of: 10/10/17  3:26 PM.  Always use your most recent med list.  
  
  
  
  
 albuterol 90 mcg/actuation inhaler Commonly known as:  Verlon Abler Take 1 Puff by inhalation every six (6) hours as needed. B COMPLEX 1 tablet Generic drug:  b complex vitamins Take 1 Tab by mouth daily. cyclobenzaprine 10 mg tablet Commonly known as:  FLEXERIL Take 1 Tab by mouth three (3) times daily as needed for Muscle Spasm(s). FLONASE 50 mcg/actuation nasal spray Generic drug:  fluticasone 2 Sprays by Nasal route daily. Administer to {[facility] RX NASAL each/right/left nostril:83901} nostril.  
  
 glatiramer 40 mg/mL injection Commonly known as:  COPAXONE 40 mg by SubCUTAneous route every Monday, Wednesday, Friday. ibuprofen 800 mg tablet Commonly known as:  MOTRIN Take 1 Tab by mouth every eight (8) hours as needed for Pain. ORTHO TRI-CYCLEN (28) PO Take  by mouth. VITAMIN D3 2,000 unit Tab Generic drug:  cholecalciferol (vitamin D3) Take  by mouth. ZyrTEC 10 mg tablet Generic drug:  cetirizine Take 20 mg by mouth. Prescriptions Sent to Pharmacy Refills  
 cyclobenzaprine (FLEXERIL) 10 mg tablet 0 Sig: Take 1 Tab by mouth three (3) times daily as needed for Muscle Spasm(s). Class: Normal  
 Pharmacy: [facility] [address] [place] [address] Ph #: [phone] Route: Oral  
 ibuprofen (MOTRIN) 800 mg tablet 0 Sig: Take 1 Tab by mouth every eight (8) hours as needed for Pain. Class: Normal  
 Pharmacy: [facility] [address] [place] [address] Ph #: [phone] Route: Oral  
  
We Performed the Following REFERRAL TO PHYSICAL THERAPY [JQQ98 Custom] Follow-up Instructions Return if symptoms worsen or fail to improve. Referral Information Referral ID Referred By Referred To  
  
 [id number] SUDHA, [address], [address] [facility] Phone: [phone] Fax: [phone] Visits Status Start Date End Date 1 New Request 10/10/17 10/10/18 If your referral has a status of pending review or denied, additional information will be sent to support the outcome of this decision. Introducing [facility]! Palak Mendoza introduces [employer] patient portal. Now you can access parts of your medical record, email your doctor's office, and request medication refills online. 1. In your internet browser, go to [email or web address] 2. Click on the First Time User? Click Here link in the Sign In box. You will see the New Member Sign Up page. 3. Enter your [employer] Access Code exactly as it appears below. You will not need to use this code after youve completed the sign-up process. If you do not sign up before the expiration date, you must request a new code. · [employer] Access Code: [email or web address] Expires: 1/8/2018  3:26 PM 
 
4. Enter the last four digits of your Social Security Number (xxxx) and Date of Birth (mm/dd/yyyy) as indicated and click Submit. You will be taken to the next sign-up page. 5. Create a [employer] ID. This will be your [employer] login ID and cannot be changed, so think of one that is secure and easy to remember. 6. Create a [employer] password. You can change your password at any time. 7. Enter your Password Reset Question and Answer. This can be used at a later time if you forget your password. 8. Enter your e-mail address. You will receive e-mail notification when new information is available in [address] 9. Click Sign Up. You can now view and download portions of your medical record. 10. Click the Download Summary menu link to download a portable copy of your medical information. If you have questions, please visit the Frequently Asked Questions section of the [employer] website. Remember, [employer] is NOT to be used for urgent needs. For medical emergencies, dial 911. Now available from your iPhone and Android! Please provide this summary of care documentation to your next provider. Your primary care clinician is listed as Carlotta Allison. If you have any questions after today's visit, please call [phone].
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
## 2017-12-14 ENCOUNTER — APPOINTMENT (OUTPATIENT)
Dept: GENERAL RADIOLOGY | Age: 32
End: 2017-12-14
Payer: COMMERCIAL

## 2017-12-14 ENCOUNTER — HOSPITAL ENCOUNTER (EMERGENCY)
Age: 32
Discharge: HOME | End: 2017-12-14
Attending: EMERGENCY MEDICINE
Payer: COMMERCIAL

## 2017-12-14 VITALS
SYSTOLIC BLOOD PRESSURE: 101 MMHG | TEMPERATURE: 98.8 F | DIASTOLIC BLOOD PRESSURE: 73 MMHG | OXYGEN SATURATION: 100 % | WEIGHT: 124.78 LBS | RESPIRATION RATE: 16 BRPM | BODY MASS INDEX: 21.42 KG/M2
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
## 2017-12-14 DIAGNOSIS — M54.6 ACUTE BILATERAL THORACIC BACK PAIN: ICD-10-CM

## 2017-12-14 DIAGNOSIS — M54.2 NECK PAIN: Primary | ICD-10-CM

## 2017-12-14 DIAGNOSIS — M54.50 ACUTE BILATERAL LOW BACK PAIN WITHOUT SCIATICA: ICD-10-CM

## 2017-12-14 LAB — HCG UR QL: NEGATIVE

## 2017-12-14 PROCEDURE — 99283 EMERGENCY DEPT VISIT LOW MDM: CPT

## 2017-12-14 PROCEDURE — 72050 X-RAY EXAM NECK SPINE 4/5VWS: CPT

## 2017-12-14 PROCEDURE — 72072 X-RAY EXAM THORAC SPINE 3VWS: CPT

## 2017-12-14 PROCEDURE — 72100 X-RAY EXAM L-S SPINE 2/3 VWS: CPT

## 2017-12-14 PROCEDURE — 81025 URINE PREGNANCY TEST: CPT

## 2017-12-14 RX ORDER — IBUPROFEN 800 MG/1
800 TABLET ORAL
Qty: 20 TAB | Refills: 0 | Status: SHIPPED | OUTPATIENT
Start: 2017-12-14 | End: 2017-12-21

## 2017-12-14 RX ORDER — METHOCARBAMOL 750 MG/1
750 TABLET, FILM COATED ORAL 3 TIMES DAILY
Qty: 15 TAB | Refills: 0 | Status: SHIPPED | OUTPATIENT
Start: 2017-12-14 | End: 2017-12-19
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
## 2017-12-14 NOTE — ED NOTES
Discharge instructions reviewed with the patient by the PA. Patient ambulatory out of ER with family.
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
## 2017-12-21 ENCOUNTER — HOSPITAL ENCOUNTER (EMERGENCY)
Age: 32
Discharge: HOME | End: 2017-12-21
Attending: EMERGENCY MEDICINE | Admitting: EMERGENCY MEDICINE
Payer: COMMERCIAL

## 2017-12-21 VITALS
WEIGHT: 124.34 LBS | TEMPERATURE: 98.2 F | HEART RATE: 71 BPM | HEIGHT: 64 IN | RESPIRATION RATE: 16 BRPM | BODY MASS INDEX: 21.23 KG/M2 | DIASTOLIC BLOOD PRESSURE: 68 MMHG | SYSTOLIC BLOOD PRESSURE: 96 MMHG | OXYGEN SATURATION: 100 %

## 2017-12-21 DIAGNOSIS — M54.50 ACUTE BILATERAL LOW BACK PAIN WITHOUT SCIATICA: Primary | ICD-10-CM
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
## 2017-12-21 PROCEDURE — 99282 EMERGENCY DEPT VISIT SF MDM: CPT

## 2017-12-21 RX ORDER — IBUPROFEN 800 MG/1
800 TABLET ORAL
Qty: 30 TAB | Refills: 0 | Status: SHIPPED | OUTPATIENT
Start: 2017-12-21 | End: 2018-03-08 | Stop reason: SDUPTHER

## 2017-12-21 RX ORDER — METHOCARBAMOL 500 MG/1
500 TABLET, FILM COATED ORAL 4 TIMES DAILY
Qty: 20 TAB | Refills: 0 | Status: SHIPPED | OUTPATIENT
Start: 2017-12-21 | End: 2017-12-22 | Stop reason: CLARIF
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
## 2017-12-21 NOTE — LETTER
[address] 
\[facility]\"" EMERGENCY DEPT 
[address] [place] [address] [address] [phone] 
[phone] Work/School Note Date: 12/21/2017 To Whom It May concern: 
 
Zachery Hightower was seen and treated today in the emergency room by the following provider(s): 
Attending Provider: Josiah Chavez MD 
Physician Assistant: Jitendra Santana PA-C. Zachery Hightower may return to work on 24 December 2017. Sincerely, Jitendra Santana PA-C

## 2017-12-21 NOTE — DISCHARGE INSTRUCTIONS
Thank you for allowing us to provide you with excellent care today. We hope we addressed all of your concerns and needs. We strive to provide excellent quality care in the Emergency Department. Please rate us as excellent, as anything less than excellent does not meet our expectations. If you feel that you have not received excellent quality care or timely care, please ask to speak to the nurse manager. Please choose us in the future for your continued health care needs. The exam and treatment you received in the Emergency Department were for an urgent problem and are not intended as complete care. It is important that you follow-up with a doctor, nurse practitioner, or physician assistant to:  (1) confirm your diagnosis,  (2) re-evaluation of changes in your illness and treatment, and  (3) for ongoing care. If your symptoms become worse or you do not improve as expected and you are unable to reach your usual health care provider, you should return to the Emergency Department. We are available 24 hours a day. Take this sheet with you when you go to your follow-up visit. If you have any problem arranging the follow-up visit, contact [address] [place] [address] [phone])    Make an appointment with your Primary Care doctor for follow up of this visit. Return to the ER if you are unable to be seen in the time recommended on your discharge instructions. Back Pain: Care Instructions  Your Care Instructions    Back pain has many possible causes. It is often related to problems with muscles and ligaments of the back. It may also be related to problems with the nerves, discs, or bones of the back. Moving, lifting, standing, sitting, or sleeping in an awkward way can strain the back. Sometimes you don't notice the injury until later. Arthritis is another common cause of back pain. Although it may hurt a lot, back pain usually improves on its own within several weeks. Most people recover in 12 weeks or less.  Using good home treatment and being careful not to stress your back can help you feel better sooner. Follow-up care is a key part of your treatment and safety. Be sure to make and go to all appointments, and call your doctor if you are having problems. It's also a good idea to know your test results and keep a list of the medicines you take. How can you care for yourself at home? · Sit or lie in positions that are most comfortable and reduce your pain. Try one of these positions when you lie down:  ¨ Lie on your back with your knees bent and supported by large pillows. ¨ Lie on the floor with your legs on the seat of a sofa or chair. Pauline Llilian on your side with your knees and hips bent and a pillow between your legs. ¨ Lie on your stomach if it does not make pain worse. · Do not sit up in bed, and avoid soft couches and twisted positions. Bed rest can help relieve pain at first, but it delays healing. Avoid bed rest after the first day of back pain. · Change positions every 30 minutes. If you must sit for long periods of time, take breaks from sitting. Get up and walk around, or lie in a comfortable position. · Try using a heating pad on a low or medium setting for 15 to 20 minutes every 2 or 3 hours. Try a warm shower in place of one session with the heating pad. · You can also try an ice pack for 10 to 15 minutes every 2 to 3 hours. Put a thin cloth between the ice pack and your skin. · Take pain medicines exactly as directed. ¨ If the doctor gave you a prescription medicine for pain, take it as prescribed. ¨ If you are not taking a prescription pain medicine, ask your doctor if you can take an over-the-counter medicine. · Take short walks several times a day. You can start with 5 to 10 minutes, 3 or 4 times a day, and work up to longer walks. Walk on level surfaces and avoid hills and stairs until your back is better. · Return to work and other activities as soon as you can.  Continued rest without activity is usually not good for your back. · To prevent future back pain, do exercises to stretch and strengthen your back and stomach. Learn how to use good posture, safe lifting techniques, and proper body mechanics. When should you call for help? Call your doctor now or seek immediate medical care if:  ? · You have new or worsening numbness in your legs. ? · You have new or worsening weakness in your legs. (This could make it hard to stand up.)   ? · You lose control of your bladder or bowels. ? Watch closely for changes in your health, and be sure to contact your doctor if:  ? · Your pain gets worse. ? · You are not getting better after 2 weeks. Where can you learn more? Go to http://elizabeth-chuck.info/. Enter J406 in the search box to learn more about \"Back Pain: Care Instructions. \"  Current as of: March 21, 2017  Content Version: 11.4  © 8334-9187 [employer]. Care instructions adapted under license by [employer] (which disclaims liability or warranty for this information). If you have questions about a medical condition or this instruction, always ask your healthcare professional. [place] [address] any warranty or liability for your use of this information.

## 2017-12-22 ENCOUNTER — OFFICE VISIT (OUTPATIENT)
Dept: NEUROLOGY | Age: 32
End: 2017-12-22

## 2017-12-22 ENCOUNTER — OFFICE VISIT (OUTPATIENT)
Dept: INTERNAL MEDICINE CLINIC | Age: 32
End: 2017-12-22

## 2017-12-22 VITALS
WEIGHT: 123.2 LBS | OXYGEN SATURATION: 100 % | HEART RATE: 74 BPM | DIASTOLIC BLOOD PRESSURE: 58 MMHG | SYSTOLIC BLOOD PRESSURE: 90 MMHG | HEIGHT: 64 IN | BODY MASS INDEX: 21.03 KG/M2 | TEMPERATURE: 97.9 F | RESPIRATION RATE: 16 BRPM

## 2017-12-22 VITALS
SYSTOLIC BLOOD PRESSURE: 105 MMHG | WEIGHT: 123 LBS | HEART RATE: 70 BPM | TEMPERATURE: 98 F | HEIGHT: 64 IN | BODY MASS INDEX: 21 KG/M2 | DIASTOLIC BLOOD PRESSURE: 61 MMHG | RESPIRATION RATE: 12 BRPM

## 2017-12-22 DIAGNOSIS — G37.9 DEMYELINATING DISEASE OF CENTRAL NERVOUS SYSTEM (HCC): ICD-10-CM

## 2017-12-22 DIAGNOSIS — M54.2 CERVICAL PAIN: ICD-10-CM

## 2017-12-22 DIAGNOSIS — H46.9 OPTIC NEUROPATHY, LEFT: Primary | ICD-10-CM

## 2017-12-22 DIAGNOSIS — M54.6 ACUTE BILATERAL THORACIC BACK PAIN: ICD-10-CM

## 2017-12-22 DIAGNOSIS — S16.1XXA STRAIN OF NECK MUSCLE, INITIAL ENCOUNTER: ICD-10-CM
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
## 2017-12-22 DIAGNOSIS — S39.012A STRAIN OF LUMBAR REGION, INITIAL ENCOUNTER: ICD-10-CM

## 2017-12-22 DIAGNOSIS — G35 MULTIPLE SCLEROSIS, RELAPSING-REMITTING (HCC): ICD-10-CM

## 2017-12-22 RX ORDER — TIZANIDINE 4 MG/1
4 TABLET ORAL
Qty: 100 TAB | Refills: 11 | Status: SHIPPED | OUTPATIENT
Start: 2017-12-22 | End: 2020-04-08 | Stop reason: DRUGHIGH

## 2017-12-22 RX ORDER — METHYLPREDNISOLONE 4 MG/1
TABLET ORAL
Qty: 1 DOSE PACK | Refills: 0 | Status: SHIPPED | OUTPATIENT
Start: 2017-12-22 | End: 2018-03-08 | Stop reason: CLARIF

## 2017-12-22 RX ORDER — DIAZEPAM 2 MG/1
2 TABLET ORAL
Qty: 15 TAB | Refills: 0 | Status: SHIPPED | OUTPATIENT
Start: 2017-12-22 | End: 2018-03-08 | Stop reason: CLARIF

## 2017-12-22 RX ORDER — MEDROXYPROGESTERONE ACETATE 150 MG/ML
150 INJECTION, SUSPENSION INTRAMUSCULAR ONCE
Status: ON HOLD | COMMUNITY
End: 2019-02-14

## 2017-12-22 NOTE — PROGRESS NOTES
Chief Complaint   Patient presents with   [facility]     Reviewed record In preparation for visit and have obtained necessary documentation    1. Have you been to the ER, urgent care clinic since your last visit? Hospitalized since your last visit? NO    2. Have you seen or consulted any other health care providers outside of the [address] [place] [address] since your last visit? Include any pap smears or colon screening. NO    Patient does not have advance directive on file.
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
## 2017-12-22 NOTE — PATIENT INSTRUCTIONS
Please be advised there is a $25 fee for all paperwork to be completed from our  providers. This is to be paid by the patient prior to picking up the completed forms. A Healthy Lifestyle: Care Instructions  Your Care Instructions    A healthy lifestyle can help you feel good, stay at a healthy weight, and have plenty of energy for both work and play. A healthy lifestyle is something you can share with your whole family. A healthy lifestyle also can lower your risk for serious health problems, such as high blood pressure, heart disease, and diabetes. You can follow a few steps listed below to improve your health and the health of your family. Follow-up care is a key part of your treatment and safety. Be sure to make and go to all appointments, and call your doctor if you are having problems. It's also a good idea to know your test results and keep a list of the medicines you take. How can you care for yourself at home? · Do not eat too much sugar, fat, or fast foods. You can still have dessert and treats now and then. The goal is moderation. · Start small to improve your eating habits. Pay attention to portion sizes, drink less juice and soda pop, and eat more fruits and vegetables. ¨ Eat a healthy amount of food. A 3-ounce serving of meat, for example, is about the size of a deck of cards. Fill the rest of your plate with vegetables and whole grains. ¨ Limit the amount of soda and sports drinks you have every day. Drink more water when you are thirsty. ¨ Eat at least 5 servings of fruits and vegetables every day. It may seem like a lot, but it is not hard to reach this goal. A serving or helping is 1 piece of fruit, 1 cup of vegetables, or 2 cups of leafy, raw vegetables. Have an apple or some carrot sticks as an afternoon snack instead of a candy bar. Try to have fruits and/or vegetables at every meal.  · Make exercise part of your daily routine.  You may want to start with simple activities, such as walking, bicycling, or slow swimming. Try to be active 30 to 60 minutes every day. You do not need to do all 30 to 60 minutes all at once. For example, you can exercise 3 times a day for 10 or 20 minutes. Moderate exercise is safe for most people, but it is always a good idea to talk to your doctor before starting an exercise program.  · Keep moving. [place]  the lawn, work in the garden, or [place]. Take the stairs instead of the elevator at work. · If you smoke, quit. People who smoke have an increased risk for heart attack, stroke, cancer, and other lung illnesses. Quitting is hard, but there are ways to boost your chance of quitting tobacco for good. ¨ Use nicotine gum, patches, or lozenges. ¨ Ask your doctor about stop-smoking programs and medicines. ¨ Keep trying. In addition to reducing your risk of diseases in the future, you will notice some benefits soon after you stop using tobacco. If you have shortness of breath or asthma symptoms, they will likely get better within a few weeks after you quit. · Limit how much alcohol you drink. Moderate amounts of alcohol (up to 2 drinks a day for men, 1 drink a day for women) are okay. But drinking too much can lead to liver problems, high blood pressure, and other health problems. Family health  If you have a family, there are many things you can do together to improve your health. · Eat meals together as a family as often as possible. · Eat healthy foods. This includes fruits, vegetables, lean meats and dairy, and whole grains. · Include your family in your fitness plan. Most people think of activities such as jogging or tennis as the way to fitness, but there are many ways you and your family can be more active. Anything that makes you breathe hard and gets your heart pumping is exercise. Here are some tips:  ¨ Walk to do errands or to take your child to school or the bus. ¨ Go for a family bike ride after dinner instead of watching TV.   Where can you learn more? Go to http://elizabeth-chuck.info/. Enter W955 in the search box to learn more about \"A Healthy Lifestyle: Care Instructions. \"  Current as of: May 12, 2017  Content Version: 11.4  © 6819-5833 Healthwise, [employer]. Care instructions adapted under license by [employer] (which disclaims liability or warranty for this information). If you have questions about a medical condition or this instruction, always ask your healthcare professional. [address] any warranty or liability for your use of this information.
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
## 2017-12-29 ENCOUNTER — HOSPITAL ENCOUNTER (OUTPATIENT)
Dept: PHYSICAL THERAPY | Age: 32
End: 2017-12-29

## 2018-01-04 ENCOUNTER — HOSPITAL ENCOUNTER (OUTPATIENT)
Dept: PHYSICAL THERAPY | Age: 33
End: 2018-01-04

## 2018-01-12 ENCOUNTER — HOSPITAL ENCOUNTER (OUTPATIENT)
Dept: PHYSICAL THERAPY | Age: 33
Discharge: HOME | End: 2018-01-12
Payer: COMMERCIAL

## 2018-01-12 ENCOUNTER — APPOINTMENT (OUTPATIENT)
Dept: PHYSICAL THERAPY | Age: 33
End: 2018-01-12

## 2018-01-12 DIAGNOSIS — M54.50 ACUTE BILATERAL LOW BACK PAIN WITHOUT SCIATICA: ICD-10-CM

## 2018-01-12 PROCEDURE — 97110 THERAPEUTIC EXERCISES: CPT | Performed by: PHYSICAL THERAPIST

## 2018-01-12 PROCEDURE — 97014 ELECTRIC STIMULATION THERAPY: CPT | Performed by: PHYSICAL THERAPIST

## 2018-01-12 PROCEDURE — 97162 PT EVAL MOD COMPLEX 30 MIN: CPT | Performed by: PHYSICAL THERAPIST
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
## 2018-01-15 ENCOUNTER — HOSPITAL ENCOUNTER (OUTPATIENT)
Dept: PHYSICAL THERAPY | Age: 33
Discharge: HOME | End: 2018-01-15
Payer: COMMERCIAL
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
## 2018-01-15 DIAGNOSIS — M54.6 ACUTE BILATERAL THORACIC BACK PAIN: ICD-10-CM

## 2018-01-15 DIAGNOSIS — M54.2 CERVICAL PAIN: ICD-10-CM

## 2018-01-15 PROCEDURE — 97110 THERAPEUTIC EXERCISES: CPT | Performed by: PHYSICAL THERAPIST

## 2018-01-15 PROCEDURE — 97014 ELECTRIC STIMULATION THERAPY: CPT | Performed by: PHYSICAL THERAPIST
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
## 2018-01-18 ENCOUNTER — TELEPHONE (OUTPATIENT)
Dept: INTERNAL MEDICINE CLINIC | Age: 33
End: 2018-01-18

## 2018-01-18 NOTE — TELEPHONE ENCOUNTER
Pt would like a callback to know if her return to work papers have been completed and faxed. Best contact  [phone] or [phone].        Message received & copied from [facility]

## 2018-01-19 ENCOUNTER — HOSPITAL ENCOUNTER (OUTPATIENT)
Dept: PHYSICAL THERAPY | Age: 33
End: 2018-01-19
Payer: COMMERCIAL

## 2018-01-19 NOTE — TELEPHONE ENCOUNTER
Called and spoke to pt. Two pt identifiers confirmed. Told pt paperwork required pt signature. Pt provided fax number to fax forms to. Fax confirmation received. No further questions or concerns at time of call.

## 2018-01-25 ENCOUNTER — APPOINTMENT (OUTPATIENT)
Dept: PHYSICAL THERAPY | Age: 33
End: 2018-01-25
Payer: COMMERCIAL

## 2018-01-29 ENCOUNTER — APPOINTMENT (OUTPATIENT)
Dept: PHYSICAL THERAPY | Age: 33
End: 2018-01-29
Payer: COMMERCIAL

## 2018-02-01 ENCOUNTER — APPOINTMENT (OUTPATIENT)
Dept: PHYSICAL THERAPY | Age: 33
End: 2018-02-01
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
## 2018-02-02 ENCOUNTER — TELEPHONE (OUTPATIENT)
Dept: NEUROLOGY | Age: 33
End: 2018-02-02

## 2018-02-02 NOTE — TELEPHONE ENCOUNTER
Left message that the form they requested to be filled out is done. Patient needs to call or come by the office to pay known fee for completion.

## 2018-02-06 ENCOUNTER — APPOINTMENT (OUTPATIENT)
Dept: PHYSICAL THERAPY | Age: 33
End: 2018-02-06

## 2018-02-08 ENCOUNTER — APPOINTMENT (OUTPATIENT)
Dept: PHYSICAL THERAPY | Age: 33
End: 2018-02-08

## 2018-02-14 ENCOUNTER — TELEPHONE (OUTPATIENT)
Dept: NEUROLOGY | Age: 33
End: 2018-02-14

## 2018-02-14 NOTE — TELEPHONE ENCOUNTER
----- Message from Genet Davis sent at 2/14/2018 10:22 AM EST -----  Regarding: Dr. Alec Morin with [employer] [facility] would like a call back regarding the [facility] paperwork. Annabelle Cortes would like to discuss the parameters surrounding the care. Pt can be reached at [phone].

## 2018-02-15 NOTE — TELEPHONE ENCOUNTER
Tried to answer questions, but his paperwork does not match ours that were done this month.  Asked them to discuss this with the patient to find out more information

## 2018-03-02 DIAGNOSIS — G35 MULTIPLE SCLEROSIS EXACERBATION (HCC): Primary | ICD-10-CM

## 2018-03-02 RX ORDER — METHYLPREDNISOLONE 4 MG/1
TABLET ORAL
Qty: 1 DOSE PACK | Refills: 0 | Status: SHIPPED | OUTPATIENT
Start: 2018-03-02 | End: 2018-03-08 | Stop reason: CLARIF

## 2018-03-08 ENCOUNTER — OFFICE VISIT (OUTPATIENT)
Dept: NEUROLOGY | Age: 33
End: 2018-03-08

## 2018-03-08 VITALS
HEART RATE: 82 BPM | WEIGHT: 120 LBS | DIASTOLIC BLOOD PRESSURE: 58 MMHG | RESPIRATION RATE: 12 BRPM | BODY MASS INDEX: 20.49 KG/M2 | HEIGHT: 64 IN | SYSTOLIC BLOOD PRESSURE: 100 MMHG

## 2018-03-08 DIAGNOSIS — H46.9 OPTIC NEUROPATHY, LEFT: ICD-10-CM

## 2018-03-08 DIAGNOSIS — G35 EXACERBATION OF MULTIPLE SCLEROSIS (HCC): ICD-10-CM

## 2018-03-08 DIAGNOSIS — G37.9 DEMYELINATING DISEASE OF CENTRAL NERVOUS SYSTEM (HCC): ICD-10-CM

## 2018-03-08 DIAGNOSIS — G35 MULTIPLE SCLEROSIS, RELAPSING-REMITTING (HCC): Primary | ICD-10-CM

## 2018-03-08 DIAGNOSIS — G35 MULTIPLE SCLEROSIS EXACERBATION (HCC): ICD-10-CM

## 2018-03-08 DIAGNOSIS — H46.9 LEFT OPTIC NEURITIS: ICD-10-CM

## 2018-03-08 RX ORDER — BENZONATATE 200 MG/1
200 CAPSULE ORAL
Qty: 50 CAP | Refills: 4 | Status: SHIPPED | OUTPATIENT
Start: 2018-03-08 | End: 2018-03-15

## 2018-03-08 RX ORDER — IBUPROFEN 800 MG/1
800 TABLET ORAL
Qty: 100 TAB | Refills: 11 | Status: SHIPPED | OUTPATIENT
Start: 2018-03-08 | End: 2021-11-05 | Stop reason: ALTCHOICE

## 2018-03-08 NOTE — MR AVS SNAPSHOT
[address] [facility], 
[address], [address] [facility] 
[phone] Patient: Milo Ortiz MRN: [id number] :1985 Visit Information Date & Time Provider Department Dept. Phone Encounter #  
 3/8/2018 12:00 PM Kevin Gutierrez MD [facility] at [facility] [phone] [id number] Follow-up Instructions Return if symptoms worsen or fail to improve. Upcoming Health Maintenance Date Due DTaP/Tdap/Td series (1 - Tdap) 2007 PAP AKA CERVICAL CYTOLOGY 2020 Allergies as of 3/8/2018  Review Complete On: 3/8/2018 By: Kevin Gutierrez MD  
  
 Severity Noted Reaction Type Reactions Sulfa (Sulfonamide Antibiotics)  2011    Hives Current Immunizations  Reviewed on 2017 Name Date Influenza Vaccine 10/10/2016, 2013 Influenza Vaccine (Quad) PF 10/10/2017 Not reviewed this visit You Were Diagnosed With   
  
 Codes Comments Multiple sclerosis, relapsing-remitting ([facility]    -  Primary ICD-10-CM: G35 
ICD-9-CM: 421 Multiple sclerosis exacerbation ([facility]     ICD-10-CM: G35 
ICD-9-CM: 477 Demyelinating disease of central nervous system ([facility]     ICD-10-CM: G37.9 ICD-9-CM: 341.9 Exacerbation of multiple sclerosis ([facility]     ICD-10-CM: G35 
ICD-9-CM: 170 Left optic neuritis     ICD-10-CM: H46.9 ICD-9-CM: 377.30 Optic neuropathy, left     ICD-10-CM: H46.9 ICD-9-CM: 377.39 Vitals BP Pulse Resp Height(growth percentile) Weight(growth percentile) BMI  
 100/58 82 12 5' 4\" (1.626 m) 120 lb (54.4 kg) 20.6 kg/m2 OB Status Smoking Status Injection Never Smoker Vitals History BMI and BSA Data Body Mass Index Body Surface Area  
 20.6 kg/m 2 1.57 m 2 Preferred Pharmacy Pharmacy Name Phone [address], [facility] [address] Your Updated Medication List  
  
   
This list is accurate as of 3/8/18 12:33 PM.  Always use your most recent med list.  
  
  
  
  
 albuterol 90 mcg/actuation inhaler Commonly known as:  Brooklynn Aver Take 1 Puff by inhalation every six (6) hours as needed. benzonatate 200 mg capsule Commonly known as:  TESSALON Take 1 Cap by mouth three (3) times daily as needed for Cough for up to 7 days. DEPO-PROVERA 150 mg/mL Syrg Generic drug:  medroxyPROGESTERone 150 mg by IntraMUSCular route once. FLONASE 50 mcg/actuation nasal spray Generic drug:  fluticasone 2 Sprays by Nasal route daily. Administer to {[facility] RX NASAL each/right/left nostril:67669} nostril.  
  
 glatiramer 40 mg/mL injection Commonly known as:  COPAXONE 40 mg by SubCUTAneous route every Monday, Wednesday, Friday. ibuprofen 800 mg tablet Commonly known as:  MOTRIN Take 1 Tab by mouth every eight (8) hours as needed for Pain. tiZANidine 4 mg tablet Commonly known as:  Waymond Cashing Take 1 Tab by mouth three (3) times daily as needed. ZyrTEC 10 mg tablet Generic drug:  cetirizine Take 20 mg by mouth. Prescriptions Sent to Pharmacy Refills  
 ibuprofen (MOTRIN) 800 mg tablet 11 Sig: Take 1 Tab by mouth every eight (8) hours as needed for Pain. Class: Normal  
 Pharmacy: [address] [place] [address] Ph #: [phone] Route: Oral  
 benzonatate (TESSALON) 200 mg capsule 4 Sig: Take 1 Cap by mouth three (3) times daily as needed for Cough for up to 7 days. Class: Normal  
 Pharmacy: [address] [place] [address] Ph #: [phone] Route: Oral  
  
Follow-up Instructions Return if symptoms worsen or fail to improve. To-Do List   
 03/13/2018 Imaging:  MRI BRAIN W WO CONT Patient Instructions Please be advised there is a $25 fee for all paperwork to be completed from our  providers. This is to be paid by the patient prior to picking up the completed forms. A Healthy Lifestyle: Care Instructions Your Care Instructions A healthy lifestyle can help you feel good, stay at a healthy weight, and have plenty of energy for both work and play. A healthy lifestyle is something you can share with your whole family. A healthy lifestyle also can lower your risk for serious health problems, such as high blood pressure, heart disease, and diabetes. You can follow a few steps listed below to improve your health and the health of your family. Follow-up care is a key part of your treatment and safety. Be sure to make and go to all appointments, and call your doctor if you are having problems. It's also a good idea to know your test results and keep a list of the medicines you take. How can you care for yourself at home? · Do not eat too much sugar, fat, or fast foods. You can still have dessert and treats now and then. The goal is moderation. · Start small to improve your eating habits. Pay attention to portion sizes, drink less juice and soda pop, and eat more fruits and vegetables. ¨ Eat a healthy amount of food. A 3-ounce serving of meat, for example, is about the size of a deck of cards. Fill the rest of your plate with vegetables and whole grains. ¨ Limit the amount of soda and sports drinks you have every day. Drink more water when you are thirsty. ¨ Eat at least 5 servings of fruits and vegetables every day. It may seem like a lot, but it is not hard to reach this goal. A serving or helping is 1 piece of fruit, 1 cup of vegetables, or 2 cups of leafy, raw vegetables. Have an apple or some carrot sticks as an afternoon snack instead of a candy bar. Try to have fruits and/or vegetables at every meal. 
· Make exercise part of your daily routine.  You may want to start with simple activities, such as walking, bicycling, or slow swimming. Try to be active 30 to 60 minutes every day. You do not need to do all 30 to 60 minutes all at once. For example, you can exercise 3 times a day for 10 or 20 minutes. Moderate exercise is safe for most people, but it is always a good idea to talk to your doctor before starting an exercise program. 
· Keep moving. Manjinder Yaneli the lawn, work in the garden, or SnapLayout. Take the stairs instead of the elevator at work. · If you smoke, quit. People who smoke have an increased risk for heart attack, stroke, cancer, and other lung illnesses. Quitting is hard, but there are ways to boost your chance of quitting tobacco for good. ¨ Use nicotine gum, patches, or lozenges. ¨ Ask your doctor about stop-smoking programs and medicines. ¨ Keep trying. In addition to reducing your risk of diseases in the future, you will notice some benefits soon after you stop using tobacco. If you have shortness of breath or asthma symptoms, they will likely get better within a few weeks after you quit. · Limit how much alcohol you drink. Moderate amounts of alcohol (up to 2 drinks a day for men, 1 drink a day for women) are okay. But drinking too much can lead to liver problems, high blood pressure, and other health problems. Family health If you have a family, there are many things you can do together to improve your health. · Eat meals together as a family as often as possible. · Eat healthy foods. This includes fruits, vegetables, lean meats and dairy, and whole grains. · Include your family in your fitness plan. Most people think of activities such as jogging or tennis as the way to fitness, but there are many ways you and your family can be more active. Anything that makes you breathe hard and gets your heart pumping is exercise. Here are some tips: 
¨ Walk to do errands or to take your child to school or the bus. ¨ Go for a family bike ride after dinner instead of watching TV. Where can you learn more? Go to http://elizabeth-chuck.info/. Enter O045 in the search box to learn more about \"A Healthy Lifestyle: Care Instructions. \" Current as of: May 12, 2017 Content Version: 11.4 © 8594-6880 [employer]. Care instructions adapted under license by [employer] (which disclaims liability or warranty for this information). If you have questions about a medical condition or this instruction, always ask your healthcare professional. [address] any warranty or liability for your use of this information. Introducing [facility]! Rebecca Maier introduces [employer] patient portal. Now you can access parts of your medical record, email your doctor's office, and request medication refills online. 1. In your internet browser, go to [email or web address] 2. Click on the First Time User? Click Here link in the Sign In box. You will see the New Member Sign Up page. 3. Enter your [employer] Access Code exactly as it appears below. You will not need to use this code after youve completed the sign-up process. If you do not sign up before the expiration date, you must request a new code. · [employer] Access Code: [email or web address] Expires: 4/11/2018  2:55 PM 
 
4. Enter the last four digits of your Social Security Number (xxxx) and Date of Birth (mm/dd/yyyy) as indicated and click Submit. You will be taken to the next sign-up page. 5. Create a Forward Talentt ID. This will be your [employer] login ID and cannot be changed, so think of one that is secure and easy to remember. 6. Create a [employer] password. You can change your password at any time. 7. Enter your Password Reset Question and Answer. This can be used at a later time if you forget your password. 8. Enter your e-mail address.  You will receive e-mail notification when new information is available in Swapsee. 9. Click Sign Up. You can now view and download portions of your medical record. 10. Click the Download Summary menu link to download a portable copy of your medical information. If you have questions, please visit the Frequently Asked Questions section of the Swapsee website. Remember, Swapsee is NOT to be used for urgent needs. For medical emergencies, dial 911. Now available from your iPhone and Android! Please provide this summary of care documentation to your next provider. Your primary care clinician is listed as Kathy Savage. If you have any questions after today's visit, please call [phone].
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
## 2018-03-27 ENCOUNTER — HOSPITAL ENCOUNTER (OUTPATIENT)
Dept: MRI IMAGING | Age: 33
Discharge: HOME | End: 2018-03-27
Attending: PSYCHIATRY & NEUROLOGY
Payer: COMMERCIAL

## 2018-03-27 DIAGNOSIS — G35 EXACERBATION OF MULTIPLE SCLEROSIS (HCC): ICD-10-CM

## 2018-03-27 DIAGNOSIS — H46.9 LEFT OPTIC NEURITIS: ICD-10-CM

## 2018-03-27 DIAGNOSIS — G37.9 DEMYELINATING DISEASE OF CENTRAL NERVOUS SYSTEM (HCC): ICD-10-CM

## 2018-03-27 DIAGNOSIS — G35 MULTIPLE SCLEROSIS, RELAPSING-REMITTING (HCC): ICD-10-CM

## 2018-03-27 DIAGNOSIS — G35 MULTIPLE SCLEROSIS EXACERBATION (HCC): ICD-10-CM

## 2018-03-27 DIAGNOSIS — H46.9 OPTIC NEUROPATHY, LEFT: ICD-10-CM

## 2018-03-27 PROCEDURE — 70553 MRI BRAIN STEM W/O & W/DYE: CPT

## 2018-03-27 PROCEDURE — A9576 INJ PROHANCE MULTIPACK: HCPCS | Performed by: PSYCHIATRY & NEUROLOGY

## 2018-03-27 PROCEDURE — 74011250636 HC RX REV CODE- 250/636: Performed by: PSYCHIATRY & NEUROLOGY

## 2018-03-27 RX ADMIN — GADOTERIDOL 11 ML: 279.3 INJECTION, SOLUTION INTRAVENOUS at 17:33

## 2018-03-29 ENCOUNTER — TELEPHONE (OUTPATIENT)
Dept: NEUROLOGY | Age: 33
End: 2018-03-29

## 2018-03-29 NOTE — TELEPHONE ENCOUNTER
Patient came in to the office and was very concerned about her MRI results and wanted to know if she should stop the copaxone all together right now. I advised her that I would send the message to Dr Gerry Mari, but do not stop the medication until talking to him as she is not having an allergic reaction to the medication. She stated that the message stated that the medication is not working. She did also inform me that her vision in her left eye is still blurry, but not worse since the last time. When it first happened, she was put on prednisone oral and it got better somewhat, but not fully and this is where she has stayed. So this is not a new symptom for her, but she wanted to make Dr Gerry Mari aware of this as well. Can you please call the patient and advise of what to do next?

## 2018-03-29 NOTE — TELEPHONE ENCOUNTER
Patient came into clinic today she stated that Sanya Kline left her a message 3/28/18 told her to stop taking the Copaxone. Pt would like to know what she should do.  Patient would like to get her MRI results

## 2018-03-30 NOTE — TELEPHONE ENCOUNTER
I called the patient and notified her of Dr. Trace Espino information below and she will be at her appointment

## 2018-03-30 NOTE — TELEPHONE ENCOUNTER
Cabrera Amato, as per our discussion continue Copaxone let me see her next week we can discuss changing therapy then  She had 4 very tiny enhancing lesions on her MRI and that is the reason we probably need to change therapy

## 2018-04-04 ENCOUNTER — OFFICE VISIT (OUTPATIENT)
Dept: NEUROLOGY | Age: 33
End: 2018-04-04

## 2018-04-04 VITALS
RESPIRATION RATE: 16 BRPM | SYSTOLIC BLOOD PRESSURE: 106 MMHG | DIASTOLIC BLOOD PRESSURE: 68 MMHG | OXYGEN SATURATION: 94 % | HEART RATE: 99 BPM | BODY MASS INDEX: 20.83 KG/M2 | HEIGHT: 64 IN | WEIGHT: 122 LBS | TEMPERATURE: 98 F

## 2018-04-04 DIAGNOSIS — G35 EXACERBATION OF MULTIPLE SCLEROSIS (HCC): ICD-10-CM

## 2018-04-04 DIAGNOSIS — G35 MULTIPLE SCLEROSIS EXACERBATION (HCC): Primary | ICD-10-CM

## 2018-04-04 DIAGNOSIS — G37.9 DEMYELINATING DISEASE OF CENTRAL NERVOUS SYSTEM (HCC): ICD-10-CM

## 2018-04-04 DIAGNOSIS — G35 MULTIPLE SCLEROSIS, RELAPSING-REMITTING (HCC): ICD-10-CM

## 2018-04-04 RX ORDER — CLEMASTINE FUMARATE 2.68 MG/1
2.68 TABLET ORAL 2 TIMES DAILY
Qty: 60 TAB | Refills: 11 | Status: SHIPPED | OUTPATIENT
Start: 2018-04-04 | End: 2018-07-26 | Stop reason: SDUPTHER

## 2018-04-04 RX ORDER — DIMETHYL FUMARATE 240 MG/1
240 CAPSULE ORAL 2 TIMES DAILY
Qty: 60 TAB | Refills: 11
Start: 2018-04-04 | End: 2019-04-17 | Stop reason: SDUPTHER

## 2018-04-04 RX ORDER — CHOLECALCIFEROL (VITAMIN D3) 125 MCG
CAPSULE ORAL DAILY
COMMUNITY

## 2018-04-04 RX ORDER — ASPIRIN 81 MG/1
TABLET ORAL DAILY
COMMUNITY
End: 2021-04-23 | Stop reason: ALTCHOICE

## 2018-04-04 RX ORDER — MULTIVIT WITH MINERALS/HERBS
1 TABLET ORAL DAILY
COMMUNITY

## 2018-04-04 NOTE — LETTER
4/4/2018 9:24 PM 
 
Patient:  Rosemarie Castro YOB: 1985 Date of Visit: 4/4/2018 Dear No Recipients: Thank you for referring Ms. Mary Ybarra to me for evaluation/treatment. Below are the relevant portions of my assessment and plan of care. Neurology Progress Note Patient ID: 
Rosemarie Castro [id number] 
29 y.o. 
1985 CHIEF COMPLAINT: Blurred vision in the left eye Subjective:  
  
Patient is a 20-year-old right-handed female seen today for urgent work in basis at the request of Dr. Aguilar Lemus for abnormal MRI scan suggesting for new lesions and recent visual loss in the left eye which suggests that she has multiple sclerosis disease uncontrolled by Copaxone. She has had an episode of optic neuritis in the left eye, and was given IV steroids with some improvement, and her visual acuity is back to 2020 with handcart and near vision today. Because of her disease activity, we discussed the new medications available, and alternative therapies to her Copaxone, and after the risks and benefits were explained to the patient in detail she wants to try Tecfidera and was advised of the pros and cons including efficacy and side effects and understands the flushing, GI side effects white count suppression and possible liver abnormalities with the medications and the need for testing as above. She understands the risk of PML with 5 cases in the monitoring needed which seems to help prevent as long because the white count drops below 0.5. She called her  and they discussed the medication and agrees to the plans as above. A starter form was given to the patient to sign that she did this will be sent in to see if we can get it covered by her insurance. She is to get back on her medications including vitamin D and multivitamins and continue her muscle relaxers as needed.   We discussed the pros and cons of Novahist, and has been that sometimes might help re-myelinization, and she would like to try it so we give her prescription for that in addition. That was on the basis of the Lancet article in neurology suggesting that stimulates oligodendrogliacytes. She has a visual acuity of 20/20, but has a subjective blurring and slight decreased color desaturation in the left eye as compared to the right. Patient had an abnormal MRI scan of the brain and the cervical spine showing demyelinating lesions some of which were enhancing in both the brain and the cervical spine last year. The patient had a previous workup in 2011 with abnormal spinal fluid suggesting demyelinating disease with elevated IgG synthesis rate, oligoclonal bands and myelin basic protein, and an abnormal MRI of the brain and cervical spine then, but she never returned for follow-up. She also had a previous episode in 2005 when I saw her 12 years ago, at that time her workup was negative for any signs of MS. She is better, and able to walk without assistive devices, but her  does say she is at times clumsy and seems more forgetful. Her vitamin D level was low and she is to start a B complex vitamin and 2000 units of vitamin D every day. 35 minutes spent with the patient in the office today, going over all the different medications, giving her information to read on them, advising her of the risks, benefits and side effects of all the different medications. Past Medical History:  
Diagnosis Date  Asthma  Asthma  Gastrointestinal disorder   
 reflux  Multiple sclerosis ([address] Past Surgical History:  
Procedure Laterality Date  HX GYN  10/2013  
 c section  HX HEENT    
 [facility] adenoids[facility] Social History Substance Use Topics  Smoking status: Never Smoker  Smokeless tobacco: Never Used  Alcohol use Yes Comment: Rare Allergies Allergen Reactions  Sulfa (Sulfonamide Antibiotics) Hives Review of Systems: A comprehensive review of systems was negative except for: Constitutional: positive for fatigue and malaise Eyes: positive for visual disturbance Neurological: positive for coordination problems, gait problems, weakness and Visual loss Objective: 
 
 
Objective:  
 
@IPVITALS(24:)@ Lab Review No results found for this or any previous visit (from the past 24 hour(s)). Additional comments:I personally viewed and interpreted the patient's MRI scans reviewed personally on the PACS system today, and they were shown to the patient NEUROLOGICAL EXAM: 
 
Appearance: The patient is well developed, well nourished, provides a coherent history and is in no acute distress. Mental Status: Oriented to time, place and person and the president, cognitive function and fund of knowledge is normal. Speech is fluent without aphasia or dysarthria. Mood and affect appropriate. Cranial Nerves:   Intact visual fields. Fundi are positive and they show optic pallor bilaterally left greater than right. No Shashank Layne pupil present. Visual acuity is 20/20 in the left eye missing only 1 number with near vision, and 20/20 in the right eye. CLEMENTE, EOM's full, no nystagmus, no ptosis. Facial sensation is normal. Corneal reflexes are not tested. Facial movement is symmetric. Hearing is normal bilaterally. Palate is midline with normal sternocleidomastoid and trapezius muscles are normal. Tongue is midline. Neck without meningismus or bruits Temporal arteries are not tender or enlarged Motor:  5/5 strength in upper and lower proximal and distal muscles. Normal bulk and tone. No fasciculations. Patient has percussion tenderness over the lumbar spine, and tenderness over the paraspinal muscles which are tight and sore Reflexes:   Deep tendon reflexes 2+/4 and symmetrical in the upper extremities, and 3+/4 in the lower extremities bilaterally. No babinski or clonus present Sensory:   Normal to touch, pinprick and temperature and vibration and temperature. DSS is intact Gait:  Abnormal gait with patient moving slowly due to spastic mildly paraplegic gait with ataxia. Tremor:   No tremor noted. Cerebellar:   Mildly abnormal Romberg and tandem cerebellar signs present. Neurovascular:  Normal heart sounds and regular rhythm, peripheral pulses intact, and no carotid bruits. Assessment: 
 
 
 
Assessment: ICD-10-CM ICD-9-CM 1. Multiple sclerosis exacerbation (HCC) G35 340 dimethyl fumarate (TECFIDERA) 240 mg cpDR  
   CBC WITH AUTOMATED DIFF  
   METABOLIC PANEL, COMPREHENSIVE  
   clemastine 2.68 mg tab 2. Multiple sclerosis, relapsing-remitting (HCC) G35 340 dimethyl fumarate (TECFIDERA) 240 mg cpDR  
   CBC WITH AUTOMATED DIFF  
   METABOLIC PANEL, COMPREHENSIVE  
   clemastine 2.68 mg tab 3. Exacerbation of multiple sclerosis (HCC) G35 340 dimethyl fumarate (TECFIDERA) 240 mg cpDR  
   CBC WITH AUTOMATED DIFF  
   METABOLIC PANEL, COMPREHENSIVE  
   clemastine 2.68 mg tab 4. Demyelinating disease of central nervous system (HCC) G37.9 341.9 dimethyl fumarate (TECFIDERA) 240 mg cpDR  
   CBC WITH AUTOMATED DIFF  
   METABOLIC PANEL, COMPREHENSIVE  
   clemastine 2.68 mg tab Plan:  
 
Patient with new problem of minor recurrent optic neuritis and for new lesions on her MRI scan suggesting a need to change her therapy. We will start her on Tecfidera after the risks and benefits of all the medication explained to the patient, and this is what she and her  would like to do. Blood work was done today and she will be monitored closely for that and be seen again in 3 months time to recheck her white count. Problem of lumbosacral and cervical strain, which she will take prescription strength Motrin and her Zanaflex as a muscle relaxant. She is to start a low back exercise program, and start her physical therapy as ordered. If she fails therapy and conservative management she may need an MRI scan of the lumbar spine. We discussed her condition with her and her family also today Follow-up in 3 months time as arranged. Signed: 
Elliot Carpio MD 
4/4/2018 
8:51 PM 
 
Marco Mari 69 This note will not be viewable in [address] If you have questions, please do not hesitate to call me. I look forward to following Ms. Augustin Stephanie along with you. Sincerely, Elliot Carpio MD

## 2018-04-04 NOTE — MR AVS SNAPSHOT
[address] [facility], 
[address], [address] [facility] 
[phone] Patient: Lita Paez MRN: [id number] :1985 Visit Information Date & Time Provider Department Dept. Phone Encounter #  
 2018 12:00 PM Boo Wilson MD [facility] at [facility] [phone] [id number] Follow-up Instructions Return in about 3 months (around 2018). Upcoming Health Maintenance Date Due DTaP/Tdap/Td series (1 - Tdap) 2007 PAP AKA CERVICAL CYTOLOGY 2020 Allergies as of 2018  Review Complete On: 2018 By: Boo Wilson MD  
  
 Severity Noted Reaction Type Reactions Sulfa (Sulfonamide Antibiotics)  2011    Hives Current Immunizations  Reviewed on 2017 Name Date Influenza Vaccine 10/10/2016, 2013 Influenza Vaccine (Quad) PF 10/10/2017 Not reviewed this visit You Were Diagnosed With   
  
 Codes Comments Multiple sclerosis exacerbation ([facility]    -  Primary ICD-10-CM: G35 
ICD-9-CM: 881 Multiple sclerosis, relapsing-remitting ([facility]     ICD-10-CM: G35 
ICD-9-CM: 673 Exacerbation of multiple sclerosis ([facility]     ICD-10-CM: G35 
ICD-9-CM: 077 Demyelinating disease of central nervous system ([facility]     ICD-10-CM: G37.9 ICD-9-CM: 341. 9 Vitals OB Status Smoking Status Injection Never Smoker Preferred Pharmacy Pharmacy Name Phone [address], [address] Your Updated Medication List  
  
   
This list is accurate as of 18  1:01 PM.  Always use your most recent med list.  
  
  
  
  
 albuterol 90 mcg/actuation inhaler Commonly known as:  Mariel Cea Take 1 Puff by inhalation every six (6) hours as needed. aspirin delayed-release 81 mg tablet Take  by mouth daily. B COMPLEX 1 tablet Generic drug:  b complex vitamins Take 1 Tab by mouth daily. clemastine 2.68 mg Tab Take 2.68 mg by mouth two (2) times a day. DEPO-PROVERA 150 mg/mL Syrg Generic drug:  medroxyPROGESTERone 150 mg by IntraMUSCular route once. dimethyl fumarate 240 mg Cpdr  
Commonly known as:  Lucero Bounds Take 240 mg by mouth two (2) times a day. FLONASE 50 mcg/actuation nasal spray Generic drug:  fluticasone 2 Sprays by Nasal route daily. Administer to {BSI RX NASAL each/right/left nostril:94392} nostril. ibuprofen 800 mg tablet Commonly known as:  MOTRIN Take 1 Tab by mouth every eight (8) hours as needed for Pain. tiZANidine 4 mg tablet Commonly known as:   Phy Take 1 Tab by mouth three (3) times daily as needed. VITAMIN D3 2,000 unit Tab Generic drug:  cholecalciferol (vitamin D3) Take  by mouth. Prescriptions Sent to Pharmacy Refills  
 clemastine 2.68 mg tab 11 Sig: Take 2.68 mg by mouth two (2) times a day. Class: Normal  
 Pharmacy: [address] #: [phone] Route: Oral  
  
We Performed the Following CBC WITH AUTOMATED DIFF [24456 CPT(R)] METABOLIC PANEL, COMPREHENSIVE [77339 CPT(R)] Follow-up Instructions Return in about 3 months (around 7/4/2018). Introducing [facility]! Mario Lynn introduces Omnid[employer] patient portal. Now you can access parts of your medical record, email your doctor's office, and request medication refills online. 1. In your internet browser, go to [email or web address] 2. Click on the First Time User? Click Here link in the Sign In box. You will see the New Member Sign Up page. 3. Enter your Omnidrone Access Code exactly as it appears below. You will not need to use this code after youve completed the sign-up process.  If you do not sign up before the expiration date, you must request a new code. · Pulsant Access Code: [email or web address] Expires: 4/11/2018  3:55 PM 
 
4. Enter the last four digits of your Social Security Number (xxxx) and Date of Birth (mm/dd/yyyy) as indicated and click Submit. You will be taken to the next sign-up page. 5. Create a Pulsant ID. This will be your Pulsant login ID and cannot be changed, so think of one that is secure and easy to remember. 6. Create a Pulsant password. You can change your password at any time. 7. Enter your Password Reset Question and Answer. This can be used at a later time if you forget your password. 8. Enter your e-mail address. You will receive e-mail notification when new information is available in [address] 9. Click Sign Up. You can now view and download portions of your medical record. 10. Click the Download Summary menu link to download a portable copy of your medical information. If you have questions, please visit the Frequently Asked Questions section of the Pulsant website. Remember, Pulsant is NOT to be used for urgent needs. For medical emergencies, dial 911. Now available from your iPhone and Android! Please provide this summary of care documentation to your next provider. Your primary care clinician is listed as Flaca Stratton. If you have any questions after today's visit, please call [phone].

## 2018-04-05 NOTE — PROGRESS NOTES
Neurology Progress Note    Patient ID:  Cass Truong  [id number]  60 y.o.  1985      CHIEF COMPLAINT: Blurred vision in the left eye    Subjective:      Patient is a 36-year-old right-handed female seen today for urgent work in basis at the request of Dr. Vanessa Nguyen for abnormal MRI scan suggesting for new lesions and recent visual loss in the left eye which suggests that she has multiple sclerosis disease uncontrolled by Copaxone. She has had an episode of optic neuritis in the left eye, and was given IV steroids with some improvement, and her visual acuity is back to 2020 with handcart and near vision today. Because of her disease activity, we discussed the new medications available, and alternative therapies to her Copaxone, and after the risks and benefits were explained to the patient in detail she wants to try Tecfidera and was advised of the pros and cons including efficacy and side effects and understands the flushing, GI side effects white count suppression and possible liver abnormalities with the medications and the need for testing as above. She understands the risk of PML with 5 cases in the monitoring needed which seems to help prevent as long because the white count drops below 0.5. She called her  and they discussed the medication and agrees to the plans as above. A starter form was given to the patient to sign that she did this will be sent in to see if we can get it covered by her insurance. She is to get back on her medications including vitamin D and multivitamins and continue her muscle relaxers as needed. We discussed the pros and cons of Novahist, and has been that sometimes might help re-myelinization, and she would like to try it so we give her prescription for that in addition. That was on the basis of the Lancet article in neurology suggesting that stimulates oligodendrogliacytes.   She has a visual acuity of 20/20, but has a subjective blurring and slight decreased color desaturation in the left eye as compared to the right. Patient had an abnormal MRI scan of the brain and the cervical spine showing demyelinating lesions some of which were enhancing in both the brain and the cervical spine last year. The patient had a previous workup in 2011 with abnormal spinal fluid suggesting demyelinating disease with elevated IgG synthesis rate, oligoclonal bands and myelin basic protein, and an abnormal MRI of the brain and cervical spine then, but she never returned for follow-up. She also had a previous episode in 2005 when I saw her 12 years ago, at that time her workup was negative for any signs of MS. She is better, and able to walk without assistive devices, but her  does say she is at times clumsy and seems more forgetful. Her vitamin D level was low and she is to start a B complex vitamin and 2000 units of vitamin D every day. 35 minutes spent with the patient in the office today, going over all the different medications, giving her information to read on them, advising her of the risks, benefits and side effects of all the different medications.          Past Medical History:   Diagnosis Date    Asthma     Asthma     Gastrointestinal disorder     reflux    Multiple sclerosis ([address]        Past Surgical History:   Procedure Laterality Date    HX GYN  10/2013    c section    HX HEENT      tonsils, adenoids, PE tubes       [unfilled]    Social History   Substance Use Topics    Smoking status: Never Smoker    Smokeless tobacco: Never Used    Alcohol use Yes      Comment: Rare           Allergies   Allergen Reactions    Sulfa (Sulfonamide Antibiotics) Hives       Review of Systems:    A comprehensive review of systems was negative except for: Constitutional: positive for fatigue and malaise  Eyes: positive for visual disturbance  Neurological: positive for coordination problems, gait problems, weakness and Visual loss    Objective:      Objective: @IPVITALS(24:)@      Lab Review No results found for this or any previous visit (from the past 24 hour(s)). Additional comments:I personally viewed and interpreted the patient's MRI scans reviewed personally on the PACS system today, and they were shown to the patient        NEUROLOGICAL EXAM:    Appearance: The patient is well developed, well nourished, provides a coherent history and is in no acute distress. Mental Status: Oriented to time, place and person and the president, cognitive function and fund of knowledge is normal. Speech is fluent without aphasia or dysarthria. Mood and affect appropriate. Cranial Nerves:   Intact visual fields. Fundi are positive and they show optic pallor bilaterally left greater than right. No Shashank Layne pupil present. Visual acuity is 20/20 in the left eye missing only 1 number with near vision, and 20/20 in the right eye. CLEMENTE, EOM's full, no nystagmus, no ptosis. Facial sensation is normal. Corneal reflexes are not tested. Facial movement is symmetric. Hearing is normal bilaterally. Palate is midline with normal sternocleidomastoid and trapezius muscles are normal. Tongue is midline. Neck without meningismus or bruits  Temporal arteries are not tender or enlarged    Motor:  5/5 strength in upper and lower proximal and distal muscles. Normal bulk and tone. No fasciculations. Patient has percussion tenderness over the lumbar spine, and tenderness over the paraspinal muscles which are tight and sore   Reflexes:   Deep tendon reflexes 2+/4 and symmetrical in the upper extremities, and 3+/4 in the lower extremities bilaterally. No babinski or clonus present   Sensory:   Normal to touch, pinprick and temperature and vibration and temperature. DSS is intact   Gait:  Abnormal gait with patient moving slowly due to spastic mildly paraplegic gait with ataxia. Tremor:   No tremor noted. Cerebellar:   Mildly abnormal Romberg and tandem cerebellar signs present. Neurovascular:  Normal heart sounds and regular rhythm, peripheral pulses intact, and no carotid bruits. Assessment:        Assessment:       ICD-10-CM ICD-9-CM    1. Multiple sclerosis exacerbation (HCC) G35 340 dimethyl fumarate (TECFIDERA) 240 mg cpDR      CBC WITH AUTOMATED DIFF      METABOLIC PANEL, COMPREHENSIVE      clemastine 2.68 mg tab   2. Multiple sclerosis, relapsing-remitting (HCC) G35 340 dimethyl fumarate (TECFIDERA) 240 mg cpDR      CBC WITH AUTOMATED DIFF      METABOLIC PANEL, COMPREHENSIVE      clemastine 2.68 mg tab   3. Exacerbation of multiple sclerosis (HCC) G35 340 dimethyl fumarate (TECFIDERA) 240 mg cpDR      CBC WITH AUTOMATED DIFF      METABOLIC PANEL, COMPREHENSIVE      clemastine 2.68 mg tab   4. Demyelinating disease of central nervous system (HCC) G37.9 341.9 dimethyl fumarate (TECFIDERA) 240 mg cpDR      CBC WITH AUTOMATED DIFF      METABOLIC PANEL, COMPREHENSIVE      clemastine 2.68 mg tab         Plan:     Patient with new problem of minor recurrent optic neuritis and for new lesions on her MRI scan suggesting a need to change her therapy. We will start her on Tecfidera after the risks and benefits of all the medication explained to the patient, and this is what she and her  would like to do. Blood work was done today and she will be monitored closely for that and be seen again in 3 months time to recheck her white count. Problem of lumbosacral and cervical strain, which she will take prescription strength Motrin and her Zanaflex as a muscle relaxant. She is to start a low back exercise program, and start her physical therapy as ordered. If she fails therapy and conservative management she may need an MRI scan of the lumbar spine. We discussed her condition with her and her family also today   Follow-up in 3 months time as arranged.       Signed:  Kareem Treviño MD  4/4/2018  8:51 PM    Yoly Vazquez MD  [phone]    This note will not be viewable in MyChart.

## 2018-04-11 ENCOUNTER — TELEPHONE (OUTPATIENT)
Dept: NEUROLOGY | Age: 33
End: 2018-04-11

## 2018-04-11 NOTE — TELEPHONE ENCOUNTER
----- Message from Alexi Jones sent at 4/11/2018 10:03 AM EDT -----  Regarding: /Telephone   [employer] calling about prior authorization for pt. Best contact number is [phone].

## 2018-04-11 NOTE — TELEPHONE ENCOUNTER
----- Message from Yuliet Comp sent at 4/11/2018 10:03 AM EDT -----  Regarding: /Telephone   [employer] calling about prior authorization for pt. Best contact number is [phone].

## 2018-04-17 NOTE — TELEPHONE ENCOUNTER
Tecfidera PA was sent to [facility] on 4/9/18. [facility] is now [address] [facility]. refaxed to [facility] and [facility] to check status of PA.

## 2018-04-23 ENCOUNTER — TELEPHONE (OUTPATIENT)
Dept: NEUROLOGY | Age: 33
End: 2018-04-23

## 2018-04-23 NOTE — TELEPHONE ENCOUNTER
Re: Tecfidera starter pack approval to 4/17/23     PA - [id number] from [facility]. Forward to [place] for specialty pharmacy.

## 2018-04-27 DIAGNOSIS — M54.2 NECK PAIN: Primary | ICD-10-CM

## 2018-05-14 ENCOUNTER — TELEPHONE (OUTPATIENT)
Dept: NEUROLOGY | Age: 33
End: 2018-05-14

## 2018-05-14 ENCOUNTER — HOSPITAL ENCOUNTER (OUTPATIENT)
Dept: PHYSICAL THERAPY | Age: 33
Discharge: HOME | End: 2018-05-14
Payer: COMMERCIAL

## 2018-05-14 PROCEDURE — 97162 PT EVAL MOD COMPLEX 30 MIN: CPT

## 2018-05-14 PROCEDURE — 97110 THERAPEUTIC EXERCISES: CPT

## 2018-05-14 NOTE — TELEPHONE ENCOUNTER
----- Message from Amira Calvert sent at 5/14/2018 12:30 PM EDT -----  Regarding: /Telephone  Pt called requesting a call back inquiring copies of paperwork needs to be fax to the number on the form . Pt best contact number is [phone].
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
## 2018-05-17 ENCOUNTER — HOSPITAL ENCOUNTER (OUTPATIENT)
Dept: PHYSICAL THERAPY | Age: 33
Discharge: HOME | End: 2018-05-17
Payer: COMMERCIAL

## 2018-05-17 PROCEDURE — 97140 MANUAL THERAPY 1/> REGIONS: CPT

## 2018-05-17 PROCEDURE — 97110 THERAPEUTIC EXERCISES: CPT

## 2018-05-17 NOTE — PROGRESS NOTES
PT DAILY TREATMENT NOTE 2-15    Patient Name: Harman Share  Date:2018  : 1985  [x]  Patient  Verified  Payor: Jarvis Payer / Plan: Nazario Murcia PPO / Product Type: PPO /    In time: 9:32 AM  Out time: 10:32 AM  Total Treatment Time (min): 60 minutes  Visit #: 2     Treatment Area: Cervicalgia [M54.2]    SUBJECTIVE  Pain Level (0-10 scale): 6.5/10  Any medication changes, allergies to medications, adverse drug reactions, diagnosis change, or new procedure performed?: [x] No    [] Yes (see summary sheet for update)  Subjective functional status/changes:   [] No changes reported  The Pt reports that her neck has been feeling very stiff since her last session. She has done her exercises at home, but only 1x daily. OBJECTIVE    40 min Therapeutic Exercise:  [x] See flow sheet :   Rationale: increase ROM and increase strength to improve the patients ability to perform ADLs and work duties with less pain or discomfort. 20 min Manual Therapy:  STM and trigger point release to UT, scalenes, cervical paraspinals, thoracic paraspinals, and suboccipital triangle bilaterally; PA glides to thoracic spine   Rationale: decrease pain, increase ROM, increase tissue extensibility and decrease trigger points  to improve the patients ability to perform ADLs and work duties with less pain or discomfort. With   [x] TE   [] TA   [] neuro   [] other: Patient Education: [x] Review HEP    [] Progressed/Changed HEP based on:   [] positioning   [] body mechanics   [] transfers   [] heat/ice application    [] other:      Other Objective/Functional Measures: None noted     Pain Level (0-10 scale) post treatment: 5/10    ASSESSMENT/Changes in Function:   The Pt was found to have significant turgor in her neck musculature (primarily R) that responded well to the manual therapy. She was very sore, but the pain reduced after the therapy program.  Will progress as tolerated during next PT session.   Patient will continue to benefit from skilled PT services to modify and progress therapeutic interventions, address functional mobility deficits, address ROM deficits, address strength deficits, analyze and address soft tissue restrictions, analyze and cue movement patterns, analyze and modify body mechanics/ergonomics, assess and modify postural abnormalities and instruct in home and community integration to attain remaining goals. []  See Plan of Care  []  See progress note/recertification  []  See Discharge Summary         Progress towards goals / Updated goals:  Short Term Goals: To be accomplished in 6 treatments:  1. The Pt will be independent and compliant with their HEP- progressing  2. The Pt will report a 50% reduction in their pain with ADLs- progressing  Long Term Goals: To be accomplished in 12 treatments:  1. The Pt will score the MCII on her FOTO survey demonstrating improved overall function (48 to 63 points)- progressing  2. The Pt will have >/= 40 degrees of cervical rotation bilaterally to allow the Pt to be able to drive with less difficulty- progressing  3.  The Pt will be able to sit >/= 2 hrs with 0-2/10 pain to allow the Pt to be able to perform work duties with less pain or discomfort- progressing     PLAN  [x]  Upgrade activities as tolerated     [x]  Continue plan of care  [x]  Update interventions per flow sheet       []  Discharge due to:_  []  Other:_      Carry Mony PT 5/17/2018  9:40 AM

## 2018-05-18 NOTE — TELEPHONE ENCOUNTER
Called the patient and faxed her form over to [phone] per her request as they did not take the form from her

## 2018-05-21 ENCOUNTER — HOSPITAL ENCOUNTER (OUTPATIENT)
Dept: PHYSICAL THERAPY | Age: 33
End: 2018-05-21
Payer: COMMERCIAL

## 2018-05-24 ENCOUNTER — HOSPITAL ENCOUNTER (OUTPATIENT)
Dept: PHYSICAL THERAPY | Age: 33
Discharge: HOME | End: 2018-05-24
Payer: COMMERCIAL

## 2018-05-24 PROCEDURE — 97110 THERAPEUTIC EXERCISES: CPT

## 2018-05-24 PROCEDURE — 97140 MANUAL THERAPY 1/> REGIONS: CPT

## 2018-05-24 NOTE — PROGRESS NOTES
PT DAILY TREATMENT NOTE 2-15    Patient Name: Shelby Castaneda  Date:2018  : 1985  [x]  Patient  Verified  Payor: Nisreen Stephenson / Plan: Yoko Barron PPO / Product Type: PPO /    In time:10:57AM  Out time:11:59AM  Total Treatment Time (min): 58  Visit #: 3     Treatment Area: Cervicalgia [M54.2]    SUBJECTIVE  Pain Level (0-10 scale): 7/10  Any medication changes, allergies to medications, adverse drug reactions, diagnosis change, or new procedure performed?: [x] No    [] Yes (see summary sheet for update)  Subjective functional status/changes:   [] No changes reported  Patient reports shat her experiences the most pain while sitting at her desk her neck continues to get stiffer as the day goes. OBJECTIVE    47 min Therapeutic Exercise:  [x] See flow sheet :   Rationale: increase ROM and increase strength to improve the patients ability to perform ADLs     15 min Manual Therapy:  Suboccipital release, MFR to the upper trap and levator, manual stretch to the upper trap   Rationale: decrease pain, increase ROM, increase tissue extensibility and decrease trigger points  to improve the patients ability to perform ADLs          With   [x] TE   [] TA   [] neuro   [] other: Patient Education: [x] Review HEP    [] Progressed/Changed HEP based on:   [x] positioning   [x] body mechanics   [] transfers   [] heat/ice application    [] other:      Other Objective/Functional Measures: No increased pain with new interventions     Pain Level (0-10 scale) post treatment: 7/10    ASSESSMENT/Changes in Function:       Patient will continue to benefit from skilled PT services to modify and progress therapeutic interventions, address functional mobility deficits, address ROM deficits and analyze and address soft tissue restrictions to attain remaining goals.      [x]  See Plan of Care  []  See progress note/recertification  []  See Discharge Summary         Progress towards goals / Updated goals:  Patient is progressing towards goals, will need to continue to improve tissue extensibility in order to reduce pain levels and progress to more advanced interventions    PLAN  [x]  Upgrade activities as tolerated     [x]  Continue plan of care  []  Update interventions per flow sheet       []  Discharge due to:_  []  Other:_      Brittany Arguelles 5/24/2018  11:21 AM

## 2018-05-29 ENCOUNTER — HOSPITAL ENCOUNTER (OUTPATIENT)
Dept: PHYSICAL THERAPY | Age: 33
Discharge: HOME | End: 2018-05-29
Payer: COMMERCIAL

## 2018-05-29 PROCEDURE — 97110 THERAPEUTIC EXERCISES: CPT

## 2018-05-29 PROCEDURE — 97140 MANUAL THERAPY 1/> REGIONS: CPT
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
## 2018-06-01 ENCOUNTER — HOSPITAL ENCOUNTER (OUTPATIENT)
Dept: PHYSICAL THERAPY | Age: 33
Discharge: HOME | End: 2018-06-01
Payer: COMMERCIAL

## 2018-06-01 PROCEDURE — 97110 THERAPEUTIC EXERCISES: CPT

## 2018-06-01 PROCEDURE — 97140 MANUAL THERAPY 1/> REGIONS: CPT

## 2018-06-01 NOTE — PROGRESS NOTES
PT DAILY TREATMENT NOTE 2-15    Patient Name: Dnaiel Jauregui  Date:2018  : 1985  [x]  Patient  Verified  Payor: Jesus Porter / Plan: Ella Giron PPO / Product Type: PPO /    In time: 9:09 AM  Out time: 10:10 AM  Total Treatment Time (min): 61 minutes  Visit #: 5     Treatment Area: Cervicalgia [M54.2]    SUBJECTIVE  Pain Level (0-10 scale): 10  Any medication changes, allergies to medications, adverse drug reactions, diagnosis change, or new procedure performed?: [x] No    [] Yes (see summary sheet for update)  Subjective functional status/changes:   [] No changes reported  The Pt reports that her neck pain is feeling a little better. She states that she has started to be able to differentiate stretching vs pain and soreness vs pain and is able to use those boundaries when doing her HEP. She states that she has been putting the heating pad on her neck at night more frequently and it helps to relax her before she sleeps. OBJECTIVE    41 min Therapeutic Exercise:  [x] See flow sheet :   Rationale: increase ROM and increase strength to improve the patients ability to perform ADLs with less pain or discomfort. 20 min Manual Therapy:  STM and trigger point release to UT, scalenes, cervical paraspinals, thoracic paraspinals, and suboccipital triangle bilaterally; PA glides to thoracic spine   Rationale: decrease pain, increase ROM, increase tissue extensibility and decrease trigger points  to improve the patients ability to perform ADLs with less pain or discomfort. With   [x] TE   [] TA   [] neuro   [] other: Patient Education: [x] Review HEP    [] Progressed/Changed HEP based on:   [] positioning   [] body mechanics   [] transfers   [] heat/ice application    [] other:      Other Objective/Functional Measures: None noted     Pain Level (0-10 scale) post treatment: 3/10    ASSESSMENT/Changes in Function:   The Pt tolerated the additions to her therapy program well.   She did have discomfort, but stated that it was a more \"intense stretch\". She was found to have increased UT compensation with shoulder Y's so they were stopped and replaced with shoulder I's that she was able to perform without UT compensation. Patient will continue to benefit from skilled PT services to modify and progress therapeutic interventions, address functional mobility deficits, address ROM deficits, address strength deficits, analyze and address soft tissue restrictions, analyze and cue movement patterns, analyze and modify body mechanics/ergonomics, assess and modify postural abnormalities and instruct in home and community integration to attain remaining goals. []  See Plan of Care  []  See progress note/recertification  []  See Discharge Summary         Progress towards goals / Updated goals:  Short Term Goals: To be accomplished in 6 treatments:  1. The Pt will be independent and compliant with their HEP- progressing  2. The Pt will report a 50% reduction in their pain with ADLs- progressing  Long Term Goals: To be accomplished in 12 treatments:  1. The Pt will score the MCII on her FOTO survey demonstrating improved overall function (48 to 63 points)- progressing  2. The Pt will have >/= 40 degrees of cervical rotation bilaterally to allow the Pt to be able to drive with less difficulty- progressing  3.  The Pt will be able to sit >/= 2 hrs with 0-2/10 pain to allow the Pt to be able to perform work duties with less pain or discomfort- progressing     PLAN  [x]  Upgrade activities as tolerated     [x]  Continue plan of care  [x]  Update interventions per flow sheet       []  Discharge due to:_  []  Other:_      Constantin Yadav, PT 6/1/2018  7:19 AM

## 2018-06-05 ENCOUNTER — HOSPITAL ENCOUNTER (OUTPATIENT)
Dept: PHYSICAL THERAPY | Age: 33
Discharge: HOME | End: 2018-06-05
Payer: COMMERCIAL

## 2018-06-05 PROCEDURE — 97140 MANUAL THERAPY 1/> REGIONS: CPT

## 2018-06-05 PROCEDURE — 97110 THERAPEUTIC EXERCISES: CPT

## 2018-06-05 NOTE — PROGRESS NOTES
PT DAILY TREATMENT NOTE 2-15    Patient Name: Lisbeth Venegas  Date:2018  : 1985  [x]  Patient  Verified  Payor: Latisha Cabrera / Plan: Vahe Yates PPO / Product Type: PPO /    In time: 2:37 PM  Out time: 3:32 PM  Total Treatment Time (min): 55 minutes  Visit #: 6     Treatment Area: Cervicalgia [M54.2]    SUBJECTIVE  Pain Level (0-10 scale): 6/10  Any medication changes, allergies to medications, adverse drug reactions, diagnosis change, or new procedure performed?: [x] No    [] Yes (see summary sheet for update)  Subjective functional status/changes:   [] No changes reported  The Pt reports that her neck started to bother her more after she worked all weekend. She states that the newer exercises are not causing any increased pain and she is able to do them at home. OBJECTIVE    40 min Therapeutic Exercise:  [x] See flow sheet :   Rationale: increase ROM and increase strength to improve the patients ability to perform ADLs with less pain or discomfort. 15 min Manual Therapy:  STM and trigger point release to UT, scalenes, cervical paraspinals, thoracic paraspinals, and suboccipital triangle bilaterally; PA glides to thoracic spine   Rationale: decrease pain, increase ROM, increase tissue extensibility and decrease trigger points  to improve the patients ability to perform ADLs with less pain or discomfort. With   [x] TE   [] TA   [] neuro   [] other: Patient Education: [x] Review HEP    [] Progressed/Changed HEP based on:   [] positioning   [] body mechanics   [] transfers   [] heat/ice application    [] other:      Other Objective/Functional Measures: None noted     Pain Level (0-10 scale) post treatment: 5/10    ASSESSMENT/Changes in Function:   The Pt tolerated the additions to her therapy program well, but did require frequent cuing to not guard or compensate with her UTs. Will progress as tolerated during next PT session.   Patient will continue to benefit from skilled PT services to modify and progress therapeutic interventions, address functional mobility deficits, address ROM deficits, address strength deficits, analyze and address soft tissue restrictions, analyze and cue movement patterns, analyze and modify body mechanics/ergonomics, assess and modify postural abnormalities and instruct in home and community integration to attain remaining goals. []  See Plan of Care  []  See progress note/recertification  []  See Discharge Summary         Progress towards goals / Updated goals:  Short Term Goals: To be accomplished in 6 treatments:  1. The Pt will be independent and compliant with their HEP- progressing  2. The Pt will report a 50% reduction in their pain with ADLs- progressing  Long Term Goals: To be accomplished in 12 treatments:  1. The Pt will score the MCII on her FOTO survey demonstrating improved overall function (48 to 63 points)- progressing  2. The Pt will have >/= 40 degrees of cervical rotation bilaterally to allow the Pt to be able to drive with less difficulty- progressing  3.  The Pt will be able to sit >/= 2 hrs with 0-2/10 pain to allow the Pt to be able to perform work duties with less pain or discomfort- progressing     PLAN  [x]  Upgrade activities as tolerated     [x]  Continue plan of care  [x]  Update interventions per flow sheet       []  Discharge due to:_  []  Other:_      Cristobal Feliz, PT 6/5/2018  3:08 PM

## 2018-06-08 ENCOUNTER — HOSPITAL ENCOUNTER (OUTPATIENT)
Dept: PHYSICAL THERAPY | Age: 33
Discharge: HOME | End: 2018-06-08
Payer: COMMERCIAL

## 2018-06-08 PROCEDURE — 97110 THERAPEUTIC EXERCISES: CPT

## 2018-06-08 PROCEDURE — 97140 MANUAL THERAPY 1/> REGIONS: CPT

## 2018-06-08 NOTE — PROGRESS NOTES
PT DAILY TREATMENT NOTE 2-15    Patient Name: Daniel Jauregui  Date:2018  : 1985  [x]  Patient  Verified  Payor: Jesus Porter / Plan: Ella Giron PPO / Product Type: PPO /    In time:1012  Out time:1105  Total Treatment Time (min): 48  Visit #: 7     Treatment Area: Cervicalgia [M54.2]    SUBJECTIVE  Pain Level (0-10 scale): 10  Any medication changes, allergies to medications, adverse drug reactions, diagnosis change, or new procedure performed?: [x] No    [] Yes (see summary sheet for update)  Subjective functional status/changes:   [] No changes reported  Patient reports her neck has been feeling better lately, however the stress from her friends wedding has caused it to aggravate     OBJECTIVE     43 min Therapeutic Exercise:  [x] See flow sheet :   Rationale: increase ROM and increase strength to improve the patients ability to perform ADLs and reduce pain levels    10 min Manual Therapy:  MFR upper trap and levator, manual upper trap stretch   Rationale: decrease pain, increase ROM, increase tissue extensibility and decrease trigger points  to improve the patients ability to perform ADLs and reduce pain levels    With   [x] TE   [] TA   [] neuro   [] other: Patient Education: [x] Review HEP    [] Progressed/Changed HEP based on:   [] positioning   [] body mechanics   [] transfers   [] heat/ice application    [] other:      Other Objective/Functional Measures: None noted     Pain Level (0-10 scale) post treatment: 3/10    ASSESSMENT/Changes in Function:   Patient continues to demonstrate upper trap compensation with p-grade t-spine rotation. Requires verbal and visual cues in order to properly perform interventions.   Patient will continue to benefit from skilled PT services to modify and progress therapeutic interventions, address functional mobility deficits, address ROM deficits, address strength deficits, analyze and address soft tissue restrictions and analyze and cue movement patterns to attain remaining goals.      [x]  See Plan of Care  []  See progress note/recertification  []  See Discharge Summary         Progress towards goals / Updated goals:  Patient is progressing towards goals, will need to continue to improve tissue extensibility in order to reduce pain levels    PLAN  [x]  Upgrade activities as tolerated     [x]  Continue plan of care  [x]  Update interventions per flow sheet       []  Discharge due to:_  []  Other:_      Jarad Gann 6/8/2018  10:18 AM

## 2018-06-12 ENCOUNTER — APPOINTMENT (OUTPATIENT)
Dept: PHYSICAL THERAPY | Age: 33
End: 2018-06-12
Payer: COMMERCIAL

## 2018-06-19 ENCOUNTER — HOSPITAL ENCOUNTER (OUTPATIENT)
Dept: PHYSICAL THERAPY | Age: 33
Discharge: HOME | End: 2018-06-19
Payer: COMMERCIAL

## 2018-06-19 PROCEDURE — 97110 THERAPEUTIC EXERCISES: CPT

## 2018-06-19 PROCEDURE — 97140 MANUAL THERAPY 1/> REGIONS: CPT

## 2018-06-19 NOTE — PROGRESS NOTES
PT DAILY TREATMENT NOTE 2-15    Patient Name: Caitlin Tirado  Date:2018  : 1985  [x]  Patient  Verified  Payor: Kiya Mejia / Plan: Rahat Pompa PPO / Product Type: PPO /    In time:1221  Out time:105  Total Treatment Time (min): 44  Visit #: 8     Treatment Area: Cervicalgia [M54.2]    SUBJECTIVE  Pain Level (0-10 scale): 10  Any medication changes, allergies to medications, adverse drug reactions, diagnosis change, or new procedure performed?: [x] No    [] Yes (see summary sheet for update)  Subjective functional status/changes:   [] No changes reported  Patient reports she had no difficulty driving up to the wedding. OBJECTIVE    34 min Therapeutic Exercise:  [x] See flow sheet :   Rationale: increase ROM and increase strength to improve the patients ability to perform ADLs and reduce pain levels    10 min Manual Therapy:  STM upper trap and levator, manual upper trap stretch   Rationale: decrease pain, increase ROM, increase tissue extensibility and decrease trigger points  to improve the patients ability to perform ADLs and reduce pain levels. With   [x] TE   [] TA   [] neuro   [] other: Patient Education: [x] Review HEP    [] Progressed/Changed HEP based on:   [] positioning   [x] body mechanics   [] transfers   [] heat/ice application    [] other:      Other Objective/Functional Measures: none noted     Pain Level (0-10 scale) post treatment: 4/10    ASSESSMENT/Changes in Function:   Improved cervical rotation. Tolerated progressed interventions well with no increased pain levels. Will plan on discharging patient next visit. Patient will continue to benefit from skilled PT services to modify and progress therapeutic interventions, address functional mobility deficits, address ROM deficits, address strength deficits, analyze and address soft tissue restrictions and analyze and cue movement patterns to attain remaining goals.      [x]  See Plan of Care  []  See progress note/recertification  []  See Discharge Summary         Progress towards goals / Updated goals:  Patient is progressing towards goals, will plan on discharging next visit.      PLAN   [x]  Upgrade activities as tolerated     [x]  Continue plan of care  [x]  Update interventions per flow sheet       []  Discharge due to:_  []  Other:_      Rachnay Citizen 6/19/2018  12:21 PM

## 2018-06-21 ENCOUNTER — HOSPITAL ENCOUNTER (OUTPATIENT)
Dept: PHYSICAL THERAPY | Age: 33
Discharge: HOME | End: 2018-06-21
Payer: COMMERCIAL

## 2018-06-21 PROCEDURE — 97110 THERAPEUTIC EXERCISES: CPT
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
## 2018-06-21 NOTE — PROGRESS NOTES
PT DAILY TREATMENT NOTE 2-15    Patient Name: Shelli Evans  Date:2018  : 1985  [x]  Patient  Verified  Payor: Victorino Knight / Plan: Babatunde Barron PPO / Product Type: PPO /    In time: 10:16 AM  Out time: 11:05 AM  Total Treatment Time (min): 49 minutes  Visit #: 9     Treatment Area: Cervicalgia [M54.2]    SUBJECTIVE  Pain Level (0-10 scale): 10  Any medication changes, allergies to medications, adverse drug reactions, diagnosis change, or new procedure performed?: [x] No    [] Yes (see summary sheet for update)  Subjective functional status/changes:   [] No changes reported  The Pt reports that her pain and function have mildly improved. She states that she is more aware of her posture and is able to correct her posture when it is \"off\". Overall, she is 50% improved since beginning therapy. OBJECTIVE    49 min Therapeutic Exercise:  [x] See flow sheet :   Rationale: increase ROM and increase strength to improve the patients ability to perform ADLs and work duties with less pain or discomfort. With   [x] TE   [] TA   [] neuro   [x] other: Patient Education: [x] Review HEP    [] Progressed/Changed HEP based on:   [] positioning   [] body mechanics   [] transfers   [] heat/ice application    [x] other: Pt educated how to safely progress her HEP independently     Other Objective/Functional Measures:  FOTO 41/100 (48/100 at initial evaluation)  Cervical AROM:  Flexion: 55  Extension: 27  Sidebending: Right- 29, Left- 27  Rotation: Right- 45, Left- 41     Pain Level (0-10 scale) post treatment:     ASSESSMENT/Changes in Function:      []  See Plan of Care  []  See progress note/recertification  [x]  See Discharge Summary         Progress towards goals / Updated goals:  Short Term Goals: To be accomplished in 6 treatments:  1. The Pt will be independent and compliant with their HEP- met  2.  The Pt will report a 50% reduction in their pain with ADLs- progressing  Long Term Goals: To be accomplished in 12 treatments:  1. The Pt will score the MCII on her FOTO survey demonstrating improved overall function (48 to 63 points)- Not met  2. The Pt will have >/= 40 degrees of cervical rotation bilaterally to allow the Pt to be able to drive with less difficulty- met  3. The Pt will be able to sit >/= 2 hrs with 0-2/10 pain to allow the Pt to be able to perform work duties with less pain or discomfort- progressing    PLAN  []  Upgrade activities as tolerated     []  Continue plan of care  []  Update interventions per flow sheet       [x]  Discharge due to: Pt has reached a plateau and is no longer progressing towards her goals.   []  Other:_      Jody East, PT 6/21/2018  10:25 AM

## 2018-07-06 NOTE — TELEPHONE ENCOUNTER
----- Message from Michael Finn sent at 7/14/2017  2:43 PM EDT -----  Regarding: /valerio Samuels, Mother,is requesting a note for the pt's employer allowing her to perform her injections. Pt will  next Thursday. Best contact number is [phone] or pt's H/C [phone]. No

## 2018-07-24 ENCOUNTER — OFFICE VISIT (OUTPATIENT)
Dept: INTERNAL MEDICINE CLINIC | Age: 33
End: 2018-07-24

## 2018-07-24 VITALS
RESPIRATION RATE: 16 BRPM | DIASTOLIC BLOOD PRESSURE: 53 MMHG | HEIGHT: 64 IN | WEIGHT: 126 LBS | TEMPERATURE: 97.8 F | BODY MASS INDEX: 21.51 KG/M2 | SYSTOLIC BLOOD PRESSURE: 93 MMHG | OXYGEN SATURATION: 100 % | HEART RATE: 74 BPM

## 2018-07-24 DIAGNOSIS — Z00.00 PHYSICAL EXAM, ANNUAL: Primary | ICD-10-CM

## 2018-07-24 DIAGNOSIS — G35 MULTIPLE SCLEROSIS, RELAPSING-REMITTING (HCC): ICD-10-CM

## 2018-07-24 NOTE — MR AVS SNAPSHOT
[address] [place] [phone] Patient: Mario Hwang MRN: [id number] :1985 Visit Information Date & Time Provider Department Dept. Phone Encounter #  
 2018 10:45 AM Birdie Alegre, [address] [id number] Follow-up Instructions Return in about 1 year (around 2019). Your Appointments 2018  1:00 PM  
Follow Up with Niki Gibson MD  
[facility] Appt Note: f/u MS, jrb 18  
 [address] [place] [address], 
[address] [place] [address], [address] [phone]  
[address] [facility], [address] [facility], [address] [facility] [address] [phone] Upcoming Health Maintenance Date Due DTaP/Tdap/Td series (1 - Tdap) 2007 Influenza Age 5 to Adult 2018 PAP AKA CERVICAL CYTOLOGY 2020 Allergies as of 2018  Review Complete On: 2018 By: Birdie Alegre MD  
  
 Severity Noted Reaction Type Reactions Sulfa (Sulfonamide Antibiotics)  2011    Hives Current Immunizations  Reviewed on 2017 Name Date Influenza Vaccine 10/10/2016, 2013 Influenza Vaccine (Quad) PF 10/10/2017 Not reviewed this visit You Were Diagnosed With   
  
 Codes Comments Physical exam, annual    -  Primary ICD-10-CM: Z00.00 ICD-9-CM: V70.0 Multiple sclerosis[facility] rel[facility]     ICD-10-CM: G35 
ICD-9-CM: 343 Vitals BP Pulse Temp Resp Height(growth percentile) Weight(growth percentile) 93/53 (BP 1 Location: Left arm, BP Patient Position: Sitting) 74 97.8 °F (36.6 °C) (Oral) 16 5' 4\" (1.626 m) 126 lb (57.2 kg) SpO2 BMI OB Status Smoking Status 100% 21.63 kg/m2 Injection Never Smoker Vitals History BMI and BSA Data Body Mass Index Body Surface Area  
 21.63 kg/m 2 1.61 m 2 Preferred Pharmacy Pharmacy Name Phone [address], [address] Your Updated Medication List  
  
   
This list is accurate as of 7/24/18 11:27 AM.  Always use your most recent med list.  
  
  
  
  
 albuterol 90 mcg/actuation inhaler Commonly known as:  Charlie Dec Take 1 Puff by inhalation every six (6) hours as needed. aspirin delayed-release 81 mg tablet Take  by mouth daily. B COMPLEX 1 tablet Generic drug:  b complex vitamins Take 1 Tab by mouth daily. clemastine 2.68 mg Tab Take 2.68 mg by mouth two (2) times a day. DEPO-PROVERA 150 mg/mL Syrg Generic drug:  medroxyPROGESTERone 150 mg by IntraMUSCular route once. dimethyl fumarate 240 mg Cpdr  
Commonly known as:  Estephania Betters Take 240 mg by mouth two (2) times a day. FLONASE 50 mcg/actuation nasal spray Generic drug:  fluticasone 2 Sprays by Nasal route daily. Administer to {[facility] RX NASAL each/right/left nostril:82397} nostril. ibuprofen 800 mg tablet Commonly known as:  MOTRIN Take 1 Tab by mouth every eight (8) hours as needed for Pain. tiZANidine 4 mg tablet Commonly known as:  Emilia Mail Take 1 Tab by mouth three (3) times daily as needed. VITAMIN D3 2,000 unit Tab Generic drug:  cholecalciferol (vitamin D3) Take  by mouth. We Performed the Following CBC W/O DIFF [93280 CPT(R)] HEMOGLOBIN A1C WITH EAG [97497 CPT(R)] LIPID PANEL [45733 CPT(R)] METABOLIC PANEL, COMPREHENSIVE [23814 CPT(R)] REFERRAL TO OPHTHALMOLOGY [REF57 Custom] TSH 3RD GENERATION [70328 CPT(R)] Follow-up Instructions Return in about 1 year (around 7/24/2019). Referral Information Referral ID Referred By Referred To  
  
 [id number] [facility] [address] Garretia Alexandro, [address] Phone: [phone] Fax: [phone] Visits Status Start Date End Date 1 New Request 7/24/18 7/24/19 If your referral has a status of pending review or denied, additional information will be sent to support the outcome of this decision. Introducing [facility]! [facility] introduces [employer] patient portal. Now you can access parts of your medical record, email your doctor's office, and request medication refills online. 1. In your internet browser, go to [email or web address] 2. Click on the First Time User? Click Here link in the Sign In box. You will see the New Member Sign Up page. 3. Enter your [employer] Access Code exactly as it appears below. You will not need to use this code after youve completed the sign-up process. If you do not sign up before the expiration date, you must request a new code. · [employer] Access Code: [email or web address] Expires: 10/14/2018  5:19 AM 
 
4. Enter the last four digits of your Social Security Number (xxxx) and Date of Birth (mm/dd/yyyy) as indicated and click Submit. You will be taken to the next sign-up page. 5. Create a [employer] ID. This will be your [employer] login ID and cannot be changed, so think of one that is secure and easy to remember. 6. Create a [employer] password. You can change your password at any time. 7. Enter your Password Reset Question and Answer. This can be used at a later time if you forget your password. 8. Enter your e-mail address. You will receive e-mail notification when new information is available in [address] 9. Click Sign Up. You can now view and download portions of your medical record. 10. Click the Download Summary menu link to download a portable copy of your medical information. If you have questions, please visit the Frequently Asked Questions section of the [employer] website. Remember, [employer] is NOT to be used for urgent needs. For medical emergencies, dial 911. Now available from your iPhone and Android! Please provide this summary of care documentation to your next provider. Your primary care clinician is listed as Griselda Simmer. If you have any questions after today's visit, please call [phone].
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
## 2018-07-25 LAB
ALBUMIN SERPL-MCNC: 4.6 G/DL (ref 3.5–5.5)
ALBUMIN/GLOB SERPL: 1.7 {RATIO} (ref 1.2–2.2)
ALP SERPL-CCNC: 64 IU/L (ref 39–117)
ALT SERPL-CCNC: 14 IU/L (ref 0–32)
AST SERPL-CCNC: 18 IU/L (ref 0–40)
BILIRUB SERPL-MCNC: 0.6 MG/DL (ref 0–1.2)
BUN SERPL-MCNC: 13 MG/DL (ref 6–20)
BUN/CREAT SERPL: 19 (ref 9–23)
CALCIUM SERPL-MCNC: 9.2 MG/DL (ref 8.7–10.2)
CHLORIDE SERPL-SCNC: 101 MMOL/L (ref 96–106)
CHOLEST SERPL-MCNC: 139 MG/DL (ref 100–199)
CO2 SERPL-SCNC: 25 MMOL/L (ref 20–29)
CREAT SERPL-MCNC: 0.7 MG/DL (ref 0.57–1)
ERYTHROCYTE [DISTWIDTH] IN BLOOD BY AUTOMATED COUNT: 13.5 % (ref 12.3–15.4)
EST. AVERAGE GLUCOSE BLD GHB EST-MCNC: 100 MG/DL
GLOBULIN SER CALC-MCNC: 2.7 G/DL (ref 1.5–4.5)
GLUCOSE SERPL-MCNC: 69 MG/DL (ref 65–99)
HBA1C MFR BLD: 5.1 % (ref 4.8–5.6)
HCT VFR BLD AUTO: 38.9 % (ref 34–46.6)
HDLC SERPL-MCNC: 58 MG/DL
HGB BLD-MCNC: 12.6 G/DL (ref 11.1–15.9)
LDLC SERPL CALC-MCNC: 74 MG/DL (ref 0–99)
MCH RBC QN AUTO: 29.8 PG (ref 26.6–33)
MCHC RBC AUTO-ENTMCNC: 32.4 G/DL (ref 31.5–35.7)
MCV RBC AUTO: 92 FL (ref 79–97)
PLATELET # BLD AUTO: 258 X10E3/UL (ref 150–379)
POTASSIUM SERPL-SCNC: 4.5 MMOL/L (ref 3.5–5.2)
PROT SERPL-MCNC: 7.3 G/DL (ref 6–8.5)
RBC # BLD AUTO: 4.23 X10E6/UL (ref 3.77–5.28)
SODIUM SERPL-SCNC: 140 MMOL/L (ref 134–144)
TRIGL SERPL-MCNC: 34 MG/DL (ref 0–149)
TSH SERPL DL<=0.005 MIU/L-ACNC: 0.86 UIU/ML (ref 0.45–4.5)
VLDLC SERPL CALC-MCNC: 7 MG/DL (ref 5–40)
WBC # BLD AUTO: 7.5 X10E3/UL (ref 3.4–10.8)

## 2018-07-26 ENCOUNTER — OFFICE VISIT (OUTPATIENT)
Dept: NEUROLOGY | Age: 33
End: 2018-07-26

## 2018-07-26 VITALS
OXYGEN SATURATION: 97 % | SYSTOLIC BLOOD PRESSURE: 100 MMHG | BODY MASS INDEX: 21.51 KG/M2 | DIASTOLIC BLOOD PRESSURE: 56 MMHG | HEIGHT: 64 IN | HEART RATE: 81 BPM | WEIGHT: 126 LBS

## 2018-07-26 DIAGNOSIS — G35 MULTIPLE SCLEROSIS EXACERBATION (HCC): ICD-10-CM

## 2018-07-26 DIAGNOSIS — H46.9 OPTIC NEUROPATHY, LEFT: Primary | ICD-10-CM

## 2018-07-26 DIAGNOSIS — G35 EXACERBATION OF MULTIPLE SCLEROSIS (HCC): ICD-10-CM

## 2018-07-26 DIAGNOSIS — G35 MULTIPLE SCLEROSIS, RELAPSING-REMITTING (HCC): ICD-10-CM

## 2018-07-26 DIAGNOSIS — G37.9 DEMYELINATING DISEASE OF CENTRAL NERVOUS SYSTEM (HCC): ICD-10-CM

## 2018-07-26 DIAGNOSIS — H46.9 LEFT OPTIC NEURITIS: ICD-10-CM

## 2018-07-26 RX ORDER — CLEMASTINE FUMARATE 2.68 MG/1
2.68 TABLET ORAL 2 TIMES DAILY
Qty: 60 TAB | Refills: 11 | Status: SHIPPED | OUTPATIENT
Start: 2018-07-26 | End: 2019-10-07 | Stop reason: CLARIF

## 2018-07-26 NOTE — PATIENT INSTRUCTIONS
Office Policies  o Phone calls/patient messages:  Please allow up to 24 hours for someone in the office to contact you about your call or message. Be mindful your provider may be out of the office or your message may require further review. We encourage you to use [facility] for your messages as this is a faster, more efficient way to communicate with our office  o Medication Refills:  Prescription medications require up to 48 business hours to process. We encourage you to use [facility] for your refills. For controlled medications: Please allow up to 72 business hours to process. Certain medications may require you to  a written prescription at our office. NO narcotic/controlled medications will be prescribed after 4pm Monday through Friday or on weekends  o Form/Paperwork Completion:  Please note there is a $25 fee for all paperwork completed by our providers. We ask that you allow 7-14 business days. Pre-payment is due prior to picking up/faxing the completed form. You may also download your forms to [facility] to have your doctor print off.  o Test Results: In order for a patient to obtain test results, an appointment must be made with the physician to review the results. Test results cannot be discussed over the phone. A Healthy Lifestyle: Care Instructions  Your Care Instructions    A healthy lifestyle can help you feel good, stay at a healthy weight, and have plenty of energy for both work and play. A healthy lifestyle is something you can share with your whole family. A healthy lifestyle also can lower your risk for serious health problems, such as high blood pressure, heart disease, and diabetes. You can follow a few steps listed below to improve your health and the health of your family. Follow-up care is a key part of your treatment and safety. Be sure to make and go to all appointments, and call your doctor if you are having problems.  It's also a good idea to know your test results and keep a list of the medicines you take. How can you care for yourself at home? · Do not eat too much sugar, fat, or fast foods. You can still have dessert and treats now and then. The goal is moderation. · Start small to improve your eating habits. Pay attention to portion sizes, drink less juice and soda pop, and eat more fruits and vegetables. ¨ Eat a healthy amount of food. A 3-ounce serving of meat, for example, is about the size of a deck of cards. Fill the rest of your plate with vegetables and whole grains. ¨ Limit the amount of soda and sports drinks you have every day. Drink more water when you are thirsty. ¨ Eat at least 5 servings of fruits and vegetables every day. It may seem like a lot, but it is not hard to reach this goal. A serving or helping is 1 piece of fruit, 1 cup of vegetables, or 2 cups of leafy, raw vegetables. Have an apple or some carrot sticks as an afternoon snack instead of a candy bar. Try to have fruits and/or vegetables at every meal.  · Make exercise part of your daily routine. You may want to start with simple activities, such as walking, bicycling, or slow swimming. Try to be active 30 to 60 minutes every day. You do not need to do all 30 to 60 minutes all at once. For example, you can exercise 3 times a day for 10 or 20 minutes. Moderate exercise is safe for most people, but it is always a good idea to talk to your doctor before starting an exercise program.  · Keep moving. Polly Davis the lawn, work in the garden, or Astley Clarke. Take the stairs instead of the elevator at work. · If you smoke, quit. People who smoke have an increased risk for heart attack, stroke, cancer, and other lung illnesses. Quitting is hard, but there are ways to boost your chance of quitting tobacco for good. ¨ Use nicotine gum, patches, or lozenges. ¨ Ask your doctor about stop-smoking programs and medicines. ¨ Keep trying.   In addition to reducing your risk of diseases in the future, you will notice some benefits soon after you stop using tobacco. If you have shortness of breath or asthma symptoms, they will likely get better within a few weeks after you quit. · Limit how much alcohol you drink. Moderate amounts of alcohol (up to 2 drinks a day for men, 1 drink a day for women) are okay. But drinking too much can lead to liver problems, high blood pressure, and other health problems. Family health  If you have a family, there are many things you can do together to improve your health. · Eat meals together as a family as often as possible. · Eat healthy foods. This includes fruits, vegetables, lean meats and dairy, and whole grains. · Include your family in your fitness plan. Most people think of activities such as jogging or tennis as the way to fitness, but there are many ways you and your family can be more active. Anything that makes you breathe hard and gets your heart pumping is exercise. Here are some tips:  ¨ Walk to do errands or to take your child to school or the bus. ¨ Go for a family bike ride after dinner instead of watching TV. Where can you learn more? Go to http://elizabeth-chuck.info/. Enter O430 in the search box to learn more about \"A Healthy Lifestyle: Care Instructions. \"  Current as of: December 7, 2017  Content Version: 11.7  © 2304-4414 [employer]. Care instructions adapted under license by [employer] (which disclaims liability or warranty for this information). If you have questions about a medical condition or this instruction, always ask your healthcare professional. [place] [address] any warranty or liability for your use of this information.

## 2018-07-26 NOTE — MR AVS SNAPSHOT
[address] [facility], 
[address], [address] [facility] 
[phone] Patient: Cisco Villavicencio MRN: [id number] :1985 Visit Information Date & Time Provider Department Dept. Phone Encounter #  
 2018  1:00 PM Chin Becerra MD [facility] [phone] [id number] Follow-up Instructions Return in about 6 months (around 2019). Upcoming Health Maintenance Date Due DTaP/Tdap/Td series (1 - Tdap) 2007 Influenza Age 5 to Adult 2018 PAP AKA CERVICAL CYTOLOGY 2020 Allergies as of 2018  Review Complete On: 2018 By: Chin Becerra MD  
  
 Severity Noted Reaction Type Reactions Sulfa (Sulfonamide Antibiotics)  2011    Hives Current Immunizations  Reviewed on 2017 Name Date Influenza Vaccine 10/10/2016, 2013 Influenza Vaccine (Quad) PF 10/10/2017 Not reviewed this visit You Were Diagnosed With   
  
 Codes Comments Optic neuropathy, left    -  Primary ICD-10-CM: H46.9 ICD-9-CM: 377.39 Demyelinating disease of central nervous system ([facility]     ICD-10-CM: G37.9 ICD-9-CM: 341.9 Multiple sclerosis exacerbation ([facility]     ICD-10-CM: G35 
ICD-9-CM: 956 Multiple sclerosis, relapsing-remitting ([facility]     ICD-10-CM: G35 
ICD-9-CM: 182 Exacerbation of multiple sclerosis ([facility]     ICD-10-CM: G35 
ICD-9-CM: 828 Left optic neuritis     ICD-10-CM: H46.9 ICD-9-CM: 377.30 Vitals BP Pulse Height(growth percentile) Weight(growth percentile) SpO2 BMI  
 100/56 81 5' 4\" (1.626 m) 126 lb (57.2 kg) 97% 21.63 kg/m2 OB Status Smoking Status Injection Never Smoker Vitals History BMI and BSA Data Body Mass Index Body Surface Area  
 21.63 kg/m 2 1.61 m 2 Preferred Pharmacy Pharmacy Name Phone 2018 [facility], [address] Your Updated Medication List  
  
   
This list is accurate as of 7/26/18  2:04 PM.  Always use your most recent med list.  
  
  
  
  
 albuterol 90 mcg/actuation inhaler Commonly known as:  Analia Tang Take 1 Puff by inhalation every six (6) hours as needed. aspirin delayed-release 81 mg tablet Take  by mouth daily. B COMPLEX 1 tablet Generic drug:  b complex vitamins Take 1 Tab by mouth daily. clemastine 2.68 mg Tab Take 2.68 mg by mouth two (2) times a day. DEPO-PROVERA 150 mg/mL Syrg Generic drug:  medroxyPROGESTERone 150 mg by IntraMUSCular route once. dimethyl fumarate 240 mg Cpdr  
Commonly known as:  Meridee Amaya Take 240 mg by mouth two (2) times a day. FLONASE 50 mcg/actuation nasal spray Generic drug:  fluticasone 2 Sprays by Nasal route daily. Administer to {[facility] RX NASAL each/right/left nostril:42382} nostril. ibuprofen 800 mg tablet Commonly known as:  MOTRIN Take 1 Tab by mouth every eight (8) hours as needed for Pain. tiZANidine 4 mg tablet Commonly known as:  Clearance Barbara Take 1 Tab by mouth three (3) times daily as needed. VITAMIN D3 2,000 unit Tab Generic drug:  cholecalciferol (vitamin D3) Take  by mouth. Prescriptions Sent to Pharmacy Refills  
 clemastine 2.68 mg tab 11 Sig: Take 2.68 mg by mouth two (2) times a day. Class: Normal  
 Pharmacy: [address] Ph #: [phone] Route: Oral  
  
We Performed the Following CBC WITH AUTOMATED DIFF [64784 CPT(R)] Follow-up Instructions Return in about 6 months (around 1/26/2019). Patient Instructions Office Policies 
o Phone calls/patient messages: Please allow up to 24 hours for someone in the office to contact you about your call or message. Be mindful your provider may be out of the office or your message may require further review. We encourage you to use [employer] for your messages as this is a faster, more efficient way to communicate with our office 
o Medication Refills: 
Prescription medications require up to 48 business hours to process. We encourage you to use [employer] for your refills. For controlled medications: Please allow up to 72 business hours to process. Certain medications may require you to  a written prescription at our office. NO narcotic/controlled medications will be prescribed after 4pm Monday through Friday or on weekends 
o Form/Paperwork Completion: 
Please note there is a $25 fee for all paperwork completed by our providers. We ask that you allow 7-14 business days. Pre-payment is due prior to picking up/faxing the completed form. You may also download your forms to [employer] to have your doctor print off. 
o Test Results: In order for a patient to obtain test results, an appointment must be made with the physician to review the results. Test results cannot be discussed over the phone. A Healthy Lifestyle: Care Instructions Your Care Instructions A healthy lifestyle can help you feel good, stay at a healthy weight, and have plenty of energy for both work and play. A healthy lifestyle is something you can share with your whole family. A healthy lifestyle also can lower your risk for serious health problems, such as high blood pressure, heart disease, and diabetes. You can follow a few steps listed below to improve your health and the health of your family. Follow-up care is a key part of your treatment and safety. Be sure to make and go to all appointments, and call your doctor if you are having problems. It's also a good idea to know your test results and keep a list of the medicines you take. How can you care for yourself at home? · Do not eat too much sugar, fat, or fast foods. You can still have dessert and treats now and then. The goal is moderation. · Start small to improve your eating habits. Pay attention to portion sizes, drink less juice and soda pop, and eat more fruits and vegetables. ¨ Eat a healthy amount of food. A 3-ounce serving of meat, for example, is about the size of a deck of cards. Fill the rest of your plate with vegetables and whole grains. ¨ Limit the amount of soda and sports drinks you have every day. Drink more water when you are thirsty. ¨ Eat at least 5 servings of fruits and vegetables every day. It may seem like a lot, but it is not hard to reach this goal. A serving or helping is 1 piece of fruit, 1 cup of vegetables, or 2 cups of leafy, raw vegetables. Have an apple or some carrot sticks as an afternoon snack instead of a candy bar. Try to have fruits and/or vegetables at every meal. 
· Make exercise part of your daily routine. You may want to start with simple activities, such as walking, bicycling, or slow swimming. Try to be active 30 to 60 minutes every day. You do not need to do all 30 to 60 minutes all at once. For example, you can exercise 3 times a day for 10 or 20 minutes. Moderate exercise is safe for most people, but it is always a good idea to talk to your doctor before starting an exercise program. 
· Keep moving. Rad Baker the lawn, work in the garden, or [employer]. Take the stairs instead of the elevator at work. · If you smoke, quit. People who smoke have an increased risk for heart attack, stroke, cancer, and other lung illnesses. Quitting is hard, but there are ways to boost your chance of quitting tobacco for good. ¨ Use nicotine gum, patches, or lozenges. ¨ Ask your doctor about stop-smoking programs and medicines. ¨ Keep trying.  
In addition to reducing your risk of diseases in the future, you will notice some benefits soon after you stop using tobacco. If you have shortness of breath or asthma symptoms, they will likely get better within a few weeks after you quit. · Limit how much alcohol you drink. Moderate amounts of alcohol (up to 2 drinks a day for men, 1 drink a day for women) are okay. But drinking too much can lead to liver problems, high blood pressure, and other health problems. Family health If you have a family, there are many things you can do together to improve your health. · Eat meals together as a family as often as possible. · Eat healthy foods. This includes fruits, vegetables, lean meats and dairy, and whole grains. · Include your family in your fitness plan. Most people think of activities such as jogging or tennis as the way to fitness, but there are many ways you and your family can be more active. Anything that makes you breathe hard and gets your heart pumping is exercise. Here are some tips: 
¨ Walk to do errands or to take your child to school or the bus. ¨ Go for a family bike ride after dinner instead of watching TV. Where can you learn more? Go to http://elizabethSunlasses.com.ngchuck.info/. Enter P427 in the search box to learn more about \"A Healthy Lifestyle: Care Instructions. \" Current as of: December 7, 2017 Content Version: 11.7 © 1603-9472 [employer]. Care instructions adapted under license by [employer] (which disclaims liability or warranty for this information). If you have questions about a medical condition or this instruction, always ask your healthcare professional. [place] [address] any warranty or liability for your use of this information. Introducing [facility]! Monie Doshi introduces [employer] patient portal. Now you can access parts of your medical record, email your doctor's office, and request medication refills online.    
 
1. In your internet browser, go to [email or web address] 2. Click on the First Time User? Click Here link in the Sign In box. You will see the New Member Sign Up page. 3. Enter your [email or web address] Access Code exactly as it appears below. You will not need to use this code after youve completed the sign-up process. If you do not sign up before the expiration date, you must request a new code. · [email or web address] Access Code: [email or web address] Expires: 10/14/2018  5:19 AM 
 
4. Enter the last four digits of your Social Security Number (xxxx) and Date of Birth (mm/dd/yyyy) as indicated and click Submit. You will be taken to the next sign-up page. 5. Create a [email or web address] ID. This will be your [email or web address] login ID and cannot be changed, so think of one that is secure and easy to remember. 6. Create a [email or web address] password. You can change your password at any time. 7. Enter your Password Reset Question and Answer. This can be used at a later time if you forget your password. 8. Enter your e-mail address. You will receive e-mail notification when new information is available in [address] 9. Click Sign Up. You can now view and download portions of your medical record. 10. Click the Download Summary menu link to download a portable copy of your medical information. If you have questions, please visit the Frequently Asked Questions section of the [email or web address] website. Remember, [email or web address] is NOT to be used for urgent needs. For medical emergencies, dial 911. Now available from your iPhone and Android! Please provide this summary of care documentation to your next provider. Your primary care clinician is listed as Cindra Duty. If you have any questions after today's visit, please call [phone].

## 2018-07-26 NOTE — LETTER
7/26/2018 9:15 PM 
 
Patient:  Alondra Bran YOB: 1985 Date of Visit: 7/26/2018 Dear No Recipients: Thank you for referring Ms. Baylee Turpin to me for evaluation/treatment. Below are the relevant portions of my assessment and plan of care. Neurology Progress Note Patient ID: 
Alondra Bran [id number] 
29 y.o. 
1985 CHIEF COMPLAINT: Blurred vision in the left eye Subjective:  
  
Patient is a 69-year-old right-handed female seen today at the request of Dr. Neel Bansal for follow-up after starting a new medication for her multiple sclerosis of Tecfidera, and need for follow-up to check her white count which we again explained to her we need is an absolute lymphocyte count because if that is low less than 500 and need to consider discontinuing the medication if it stays that way more than 6 months to help prevent PML. Last visit she had abnormal MRI scan suggesting for new lesions and recent visual loss in the left eye which suggests that she has multiple sclerosis disease uncontrolled by Copaxone and was switched to Tecfidera which she is tolerating well with only mild nausea, and does not think is trying to make her stop the medication, because is slowly getting better and she wants to try to continue on the medication but otherwise she has done well without any exacerbations or worsening of symptoms. He has had no flushing,. She has had an episode of optic neuritis in the left eye, and was given IV steroids with some improvement, and her visual acuity is back to 2020 with handcart and near vision today. She is aware of the medication including efficacy and side effects and understands the flushing, GI side effects white count suppression and possible liver abnormalities with the medications and the need for testing as above. She understands the risk of PML with 5 cases in the monitoring needed which seems to help prevent as long because the white count drops below 0.5. She is to get back on her medications including vitamin D and multivitamins and continue her muscle relaxers as needed. We discussed the pros and cons of Novahist, and has been that sometimes might help re-myelinization, and she would like to try it so we give her prescription for that in addition. That was on the basis of the Lancet article in neurology suggesting that stimulates oligodendrogliacytes. She has a visual acuity of 20/20, but has a subjective blurring and slight decreased color desaturation in the left eye as compared to the right. Patient had an abnormal MRI scan of the brain and the cervical spine showing demyelinating lesions some of which were enhancing in both the brain and the cervical spine last year. The patient had a previous workup in 2011 with abnormal spinal fluid suggesting demyelinating disease with elevated IgG synthesis rate, oligoclonal bands and myelin basic protein, and an abnormal MRI of the brain and cervical spine then, but she never returned for follow-up. She also had a previous episode in 2005 when I saw her 12 years ago, at that time her workup was negative for any signs of MS. She is better, and able to walk without assistive devices, but her  does say she is at times clumsy and seems more forgetful. Her vitamin D level was low and she is to start a B complex vitamin and 2000 units of vitamin D every day. 35 minutes spent with the patient in the office today, going over all the different medications, giving her information to read on them, advising her of the risks, benefits and side effects of all the different medications. Past Medical History:  
Diagnosis Date  Asthma  Asthma  Gastrointestinal disorder   
 reflux  Multiple sclerosis ([address] Past Surgical History:  
Procedure Laterality Date  HX GYN  10/2013  
 c section  HX HEENT    
 tonsils, adenoids, PE tubes [unfilled] Social History Substance Use Topics  Smoking status: Never Smoker  Smokeless tobacco: Never Used  Alcohol use Yes Comment: Rare Allergies Allergen Reactions  Sulfa (Sulfonamide Antibiotics) Hives Review of Systems: A comprehensive review of systems was negative except for: Constitutional: positive for fatigue and malaise Eyes: positive for visual disturbance Neurological: positive for coordination problems, gait problems, weakness and Visual loss Objective: 
 
 
Objective:  
 
@IPVITALS(24:)@ Lab Review No results found for this or any previous visit (from the past 24 hour(s)). Additional comments:I personally viewed and interpreted the patient's MRI scans reviewed personally on the PACS system today, and they were shown to the patient NEUROLOGICAL EXAM: 
 
Appearance: The patient is well developed, well nourished, provides a coherent history and is in no acute distress. Mental Status: Oriented to time, place and person and the president, cognitive function and fund of knowledge is normal. Speech is fluent without aphasia or dysarthria. Mood and affect appropriate. Cranial Nerves:   Intact visual fields. Fundi are positive and they show optic pallor bilaterally left greater than right. No Shashank Layne pupil present. Visual acuity is 20/20 in the left eye missing only 1 number with near vision, and 20/20 in the right eye. CLEMENTE, EOM's full, no nystagmus, no ptosis. Facial sensation is normal. Corneal reflexes are not tested. Facial movement is symmetric. Hearing is normal bilaterally. Palate is midline with normal sternocleidomastoid and trapezius muscles are normal. Tongue is midline. Neck without meningismus or bruits Temporal arteries are not tender or enlarged Motor:  5/5 strength in upper and lower proximal and distal muscles. Normal bulk and tone. No fasciculations.  
Patient has percussion tenderness over the lumbar spine, and tenderness over the paraspinal muscles which are tight and sore Reflexes:   Deep tendon reflexes 2+/4 and symmetrical in the upper extremities, and 3+/4 in the lower extremities bilaterally. No babinski or clonus present Sensory:   Normal to touch, pinprick and temperature and vibration and temperature. DSS is intact Gait:  Abnormal gait with patient moving slowly due to spastic mildly paraplegic gait with ataxia. Tremor:   No tremor noted. Cerebellar:   Mildly abnormal Romberg and tandem cerebellar signs present. Neurovascular:  Normal heart sounds and regular rhythm, peripheral pulses intact, and no carotid bruits. Assessment: 
 
 
 
Assessment: ICD-10-CM ICD-9-CM 1. Optic neuropathy, left - recurrent H46.9 377.39 clemastine 2.68 mg tab CBC WITH AUTOMATED DIFF 2. Demyelinating disease of central nervous system (HCC) G37.9 341.9 clemastine 2.68 mg tab CBC WITH AUTOMATED DIFF 3. Multiple sclerosis exacerbation (HCC) G35 340 clemastine 2.68 mg tab CBC WITH AUTOMATED DIFF 4. Multiple sclerosis, relapsing-remitting (HCC) G35 340 clemastine 2.68 mg tab CBC WITH AUTOMATED DIFF 5. Exacerbation of multiple sclerosis (HCC) G35 340 clemastine 2.68 mg tab CBC WITH AUTOMATED DIFF 6. Left optic neuritis H46.9 377.30 clemastine 2.68 mg tab CBC WITH AUTOMATED DIFF Plan:  
 
Patient with no side effects except mild nausea with the Tecfidera, and she wants to continue the medications so we will continue them. White count will be checked again for absolute lymphocyte count, and she had a recent chemistry that showed normal liver function tests She has had no relapses and no exacerbations on the medication so far. We will repeat her MRI scan at one years time. Blood work was done today and she will be monitored closely for that and be seen again in 3 months time to recheck her white count.  
Problem of lumbosacral and cervical strain, which she will take prescription strength Motrin and her Zanaflex as a muscle relaxant. She is to start a low back exercise program, and start her physical therapy as ordered. If she fails therapy and conservative management she may need an MRI scan of the lumbar spine. We discussed her condition with her and her family also today Follow-up in 6 months time as arranged. Signed: 
Jeannie Duff MD 
7/26/2018 
8:51 PM 
 
[address] 69 This note will not be viewable in [address] If you have questions, please do not hesitate to call me. I look forward to following Ms. Pascale Roberts along with you. Sincerely, Jeannie Duff MD

## 2018-07-27 NOTE — PROGRESS NOTES
Neurology Progress Note    Patient ID:  Carmen Pedraza  [id number]  39 y.o.  1985      CHIEF COMPLAINT: Blurred vision in the left eye    Subjective:      Patient is a 50-year-old right-handed female seen today at the request of Dr. Jonh Hawkins for follow-up after starting a new medication for her multiple sclerosis of Tecfidera, and need for follow-up to check her white count which we again explained to her we need is an absolute lymphocyte count because if that is low less than 500 and need to consider discontinuing the medication if it stays that way more than 6 months to help prevent PML. Last visit she had abnormal MRI scan suggesting for new lesions and recent visual loss in the left eye which suggests that she has multiple sclerosis disease uncontrolled by Copaxone and was switched to Tecfidera which she is tolerating well with only mild nausea, and does not think is trying to make her stop the medication, because is slowly getting better and she wants to try to continue on the medication but otherwise she has done well without any exacerbations or worsening of symptoms. He has had no flushing,. She has had an episode of optic neuritis in the left eye, and was given IV steroids with some improvement, and her visual acuity is back to 2020 with handcart and near vision today. She is aware of the medication including efficacy and side effects and understands the flushing, GI side effects white count suppression and possible liver abnormalities with the medications and the need for testing as above. She understands the risk of PML with 5 cases in the monitoring needed which seems to help prevent as long because the white count drops below 0.5. She is to get back on her medications including vitamin D and multivitamins and continue her muscle relaxers as needed.   We discussed the pros and cons of Novahist, and has been that sometimes might help re-myelinization, and she would like to try it so we give her prescription for that in addition. That was on the basis of the Lancet article in neurology suggesting that stimulates oligodendrogliacytes. She has a visual acuity of 20/20, but has a subjective blurring and slight decreased color desaturation in the left eye as compared to the right. Patient had an abnormal MRI scan of the brain and the cervical spine showing demyelinating lesions some of which were enhancing in both the brain and the cervical spine last year. The patient had a previous workup in 2011 with abnormal spinal fluid suggesting demyelinating disease with elevated IgG synthesis rate, oligoclonal bands and myelin basic protein, and an abnormal MRI of the brain and cervical spine then, but she never returned for follow-up. She also had a previous episode in 2005 when I saw her 12 years ago, at that time her workup was negative for any signs of MS. She is better, and able to walk without assistive devices, but her  does say she is at times clumsy and seems more forgetful. Her vitamin D level was low and she is to start a B complex vitamin and 2000 units of vitamin D every day. 35 minutes spent with the patient in the office today, going over all the different medications, giving her information to read on them, advising her of the risks, benefits and side effects of all the different medications.          Past Medical History:   Diagnosis Date    Asthma     Asthma     Gastrointestinal disorder     reflux    Multiple sclerosis ([address]        Past Surgical History:   Procedure Laterality Date    HX GYN  10/2013    c section    HX HEENT      tonsils, adenoids, PE tubes       [unfilled]    Social History   Substance Use Topics    Smoking status: Never Smoker    Smokeless tobacco: Never Used    Alcohol use Yes      Comment: Rare           Allergies   Allergen Reactions    Sulfa (Sulfonamide Antibiotics) Hives       Review of Systems:    A comprehensive review of systems was negative except for: Constitutional: positive for fatigue and malaise  Eyes: positive for visual disturbance  Neurological: positive for coordination problems, gait problems, weakness and Visual loss    Objective:      Objective:     @IPVITALS(24:)@      Lab Review No results found for this or any previous visit (from the past 24 hour(s)). Additional comments:I personally viewed and interpreted the patient's MRI scans reviewed personally on the PACS system today, and they were shown to the patient        NEUROLOGICAL EXAM:    Appearance: The patient is well developed, well nourished, provides a coherent history and is in no acute distress. Mental Status: Oriented to time, place and person and the president, cognitive function and fund of knowledge is normal. Speech is fluent without aphasia or dysarthria. Mood and affect appropriate. Cranial Nerves:   Intact visual fields. Fundi are positive and they show optic pallor bilaterally left greater than right. No Shashank Layne pupil present. Visual acuity is 20/20 in the left eye missing only 1 number with near vision, and 20/20 in the right eye. CLEMENTE, EOM's full, no nystagmus, no ptosis. Facial sensation is normal. Corneal reflexes are not tested. Facial movement is symmetric. Hearing is normal bilaterally. Palate is midline with normal sternocleidomastoid and trapezius muscles are normal. Tongue is midline. Neck without meningismus or bruits  Temporal arteries are not tender or enlarged    Motor:  5/5 strength in upper and lower proximal and distal muscles. Normal bulk and tone. No fasciculations. Patient has percussion tenderness over the lumbar spine, and tenderness over the paraspinal muscles which are tight and sore   Reflexes:   Deep tendon reflexes 2+/4 and symmetrical in the upper extremities, and 3+/4 in the lower extremities bilaterally. No babinski or clonus present   Sensory:   Normal to touch, pinprick and temperature and vibration and temperature.   DSS is intact   Gait:  Abnormal gait with patient moving slowly due to spastic mildly paraplegic gait with ataxia. Tremor:   No tremor noted. Cerebellar:   Mildly abnormal Romberg and tandem cerebellar signs present. Neurovascular:  Normal heart sounds and regular rhythm, peripheral pulses intact, and no carotid bruits. Assessment:        Assessment:       ICD-10-CM ICD-9-CM    1. Optic neuropathy, left - recurrent H46.9 377.39 clemastine 2.68 mg tab      CBC WITH AUTOMATED DIFF   2. Demyelinating disease of central nervous system (HCC) G37.9 341.9 clemastine 2.68 mg tab      CBC WITH AUTOMATED DIFF   3. Multiple sclerosis exacerbation (HCC) G35 340 clemastine 2.68 mg tab      CBC WITH AUTOMATED DIFF   4. Multiple sclerosis, relapsing-remitting (HCC) G35 340 clemastine 2.68 mg tab      CBC WITH AUTOMATED DIFF   5. Exacerbation of multiple sclerosis (HCC) G35 340 clemastine 2.68 mg tab      CBC WITH AUTOMATED DIFF   6. Left optic neuritis H46.9 377.30 clemastine 2.68 mg tab      CBC WITH AUTOMATED DIFF         Plan:     Patient with no side effects except mild nausea with the Tecfidera, and she wants to continue the medications so we will continue them. White count will be checked again for absolute lymphocyte count, and she had a recent chemistry that showed normal liver function tests  She has had no relapses and no exacerbations on the medication so far. We will repeat her MRI scan at one years time. Blood work was done today and she will be monitored closely for that and be seen again in 3 months time to recheck her white count. Problem of lumbosacral and cervical strain, which she will take prescription strength Motrin and her Zanaflex as a muscle relaxant. She is to start a low back exercise program, and start her physical therapy as ordered. If she fails therapy and conservative management she may need an MRI scan of the lumbar spine.   We discussed her condition with her and her family also today Follow-up in 6 months time as arranged. Signed:  Sam Wells MD  7/26/2018  8:51 PM    Griselda Simmer, MD  [phone]    This note will not be viewable in [address]

## 2018-11-05 ENCOUNTER — HOSPITAL ENCOUNTER (EMERGENCY)
Age: 33
Discharge: HOME | End: 2018-11-05
Attending: EMERGENCY MEDICINE
Payer: SELF-PAY

## 2018-11-05 VITALS
RESPIRATION RATE: 18 BRPM | BODY MASS INDEX: 22.77 KG/M2 | OXYGEN SATURATION: 100 % | SYSTOLIC BLOOD PRESSURE: 106 MMHG | HEART RATE: 92 BPM | TEMPERATURE: 98.2 F | WEIGHT: 133.38 LBS | HEIGHT: 64 IN | DIASTOLIC BLOOD PRESSURE: 66 MMHG

## 2018-11-05 DIAGNOSIS — L02.01 ABSCESS OF FACE: Primary | ICD-10-CM

## 2018-11-05 PROCEDURE — 99283 EMERGENCY DEPT VISIT LOW MDM: CPT

## 2018-11-05 PROCEDURE — 74011250637 HC RX REV CODE- 250/637: Performed by: PHYSICIAN ASSISTANT

## 2018-11-05 PROCEDURE — 75810000289 HC I&D ABSCESS SIMP/COMP/MULT

## 2018-11-05 PROCEDURE — 74011000250 HC RX REV CODE- 250: Performed by: PHYSICIAN ASSISTANT

## 2018-11-05 RX ORDER — DOXYCYCLINE HYCLATE 100 MG
100 TABLET ORAL
Status: COMPLETED | OUTPATIENT
Start: 2018-11-05 | End: 2018-11-05

## 2018-11-05 RX ORDER — DOXYCYCLINE 100 MG/1
100 CAPSULE ORAL 2 TIMES DAILY
Qty: 20 CAP | Refills: 0 | Status: SHIPPED | OUTPATIENT
Start: 2018-11-06 | End: 2018-11-05

## 2018-11-05 RX ORDER — DOXYCYCLINE 100 MG/1
100 CAPSULE ORAL 2 TIMES DAILY
Qty: 20 CAP | Refills: 0 | Status: SHIPPED | OUTPATIENT
Start: 2018-11-06 | End: 2018-11-16

## 2018-11-05 RX ORDER — LIDOCAINE HYDROCHLORIDE AND EPINEPHRINE 20; 10 MG/ML; UG/ML
1.5 INJECTION, SOLUTION INFILTRATION; PERINEURAL
Status: COMPLETED | OUTPATIENT
Start: 2018-11-05 | End: 2018-11-05

## 2018-11-05 RX ADMIN — LIDOCAINE HYDROCHLORIDE,EPINEPHRINE BITARTRATE 30 MG: 20; .01 INJECTION, SOLUTION INFILTRATION; PERINEURAL at 19:23

## 2018-11-05 RX ADMIN — DOXYCYCLINE HYCLATE 100 MG: 100 TABLET, COATED ORAL at 19:23

## 2018-11-05 NOTE — LETTER
[address] 
[facility] EMERGENCY DEPT 
[address] [place] [address] [address] [id number] 
[phone] Work/School Note Date: 11/5/2018 To Whom It May concern: 
 
Fidencio Mcginnis was seen and treated today in the emergency room by the following provider(s): 
Attending Provider: Bety Meeks MD 
Physician Assistant: EILEEN Schneider. Fidencio Mcginnis may return to work on 11/7/18 or sooner, if feeling better. Sincerely, EILEEN Morales

## 2018-11-06 NOTE — ED NOTES
Pt discharged by Yaritza Maddox. Pt provided with discharge instructions Rx and instructions on follow up care. Pt out of ED in stable condition accompanied by self.

## 2018-11-14 ENCOUNTER — TELEPHONE (OUTPATIENT)
Dept: NEUROLOGY | Age: 33
End: 2018-11-14

## 2019-01-24 ENCOUNTER — TELEPHONE (OUTPATIENT)
Dept: NEUROLOGY | Age: 34
End: 2019-01-24

## 2019-01-24 NOTE — TELEPHONE ENCOUNTER
Received call from patient, she stated that she just found out that she is pregnant and was told by Dr. Adam Mcdowell to let him know. She wants to know if she needs to come in for an appt, or what she needs to do. She would like a call back please.       [phone]

## 2019-01-25 ENCOUNTER — TELEPHONE (OUTPATIENT)
Dept: NEUROLOGY | Age: 34
End: 2019-01-25

## 2019-01-25 NOTE — TELEPHONE ENCOUNTER
----- Message from Jayesh Alan sent at 1/25/2019  8:31 AM EST -----  Regarding: /Telephone  Pt called requesting a call back in regards to pt is pregnant and is inquiring if she should continue or stop taking medication. Pt best contact number is [phone] .

## 2019-01-25 NOTE — TELEPHONE ENCOUNTER
Patient is on tecfidera and clemastine  She stopped when she took the test and found she was pregnant. She found out 2 days ago. I advised for her to stay off of per Dr Kingsley Pichardo who is on call.    I scheduled her for 1/30/19 to see Dr Edie Patel as well

## 2019-01-29 ENCOUNTER — HOSPITAL ENCOUNTER (EMERGENCY)
Age: 34
Discharge: HOME | End: 2019-01-30
Attending: EMERGENCY MEDICINE
Payer: COMMERCIAL

## 2019-01-29 ENCOUNTER — APPOINTMENT (OUTPATIENT)
Dept: ULTRASOUND IMAGING | Age: 34
End: 2019-01-29
Attending: STUDENT IN AN ORGANIZED HEALTH CARE EDUCATION/TRAINING PROGRAM
Payer: COMMERCIAL

## 2019-01-29 VITALS
HEART RATE: 75 BPM | HEIGHT: 64 IN | DIASTOLIC BLOOD PRESSURE: 67 MMHG | WEIGHT: 131.17 LBS | RESPIRATION RATE: 18 BRPM | OXYGEN SATURATION: 100 % | TEMPERATURE: 98.3 F | BODY MASS INDEX: 22.39 KG/M2 | SYSTOLIC BLOOD PRESSURE: 105 MMHG

## 2019-01-29 DIAGNOSIS — O20.9 VAGINAL BLEEDING IN PREGNANCY, FIRST TRIMESTER: Primary | ICD-10-CM

## 2019-01-29 LAB
ANION GAP SERPL CALC-SCNC: 6 MMOL/L (ref 5–15)
BASOPHILS # BLD: 0 K/UL (ref 0–0.1)
BASOPHILS NFR BLD: 0 % (ref 0–1)
BUN SERPL-MCNC: 15 MG/DL (ref 6–20)
BUN/CREAT SERPL: 20 (ref 12–20)
CALCIUM SERPL-MCNC: 8.7 MG/DL (ref 8.5–10.1)
CHLORIDE SERPL-SCNC: 106 MMOL/L (ref 97–108)
CO2 SERPL-SCNC: 29 MMOL/L (ref 21–32)
CREAT SERPL-MCNC: 0.74 MG/DL (ref 0.55–1.02)
DIFFERENTIAL METHOD BLD: ABNORMAL
EOSINOPHIL # BLD: 0.1 K/UL (ref 0–0.4)
EOSINOPHIL NFR BLD: 1 % (ref 0–7)
ERYTHROCYTE [DISTWIDTH] IN BLOOD BY AUTOMATED COUNT: 13.3 % (ref 11.5–14.5)
GLUCOSE SERPL-MCNC: 84 MG/DL (ref 65–100)
HCG SERPL-ACNC: 118 MIU/ML (ref 0–6)
HCT VFR BLD AUTO: 35.3 % (ref 35–47)
HGB BLD-MCNC: 11.4 G/DL (ref 11.5–16)
IMM GRANULOCYTES # BLD AUTO: 0 K/UL (ref 0–0.04)
IMM GRANULOCYTES NFR BLD AUTO: 0 % (ref 0–0.5)
LYMPHOCYTES # BLD: 1.8 K/UL (ref 0.8–3.5)
LYMPHOCYTES NFR BLD: 35 % (ref 12–49)
MCH RBC QN AUTO: 30.9 PG (ref 26–34)
MCHC RBC AUTO-ENTMCNC: 32.3 G/DL (ref 30–36.5)
MCV RBC AUTO: 95.7 FL (ref 80–99)
MONOCYTES # BLD: 0.6 K/UL (ref 0–1)
MONOCYTES NFR BLD: 12 % (ref 5–13)
NEUTS SEG # BLD: 2.6 K/UL (ref 1.8–8)
NEUTS SEG NFR BLD: 50 % (ref 32–75)
NRBC # BLD: 0 K/UL (ref 0–0.01)
NRBC BLD-RTO: 0 PER 100 WBC
PLATELET # BLD AUTO: 229 K/UL (ref 150–400)
PMV BLD AUTO: 11 FL (ref 8.9–12.9)
POTASSIUM SERPL-SCNC: 3.6 MMOL/L (ref 3.5–5.1)
RBC # BLD AUTO: 3.69 M/UL (ref 3.8–5.2)
SODIUM SERPL-SCNC: 141 MMOL/L (ref 136–145)
WBC # BLD AUTO: 5.2 K/UL (ref 3.6–11)

## 2019-01-29 PROCEDURE — 76815 OB US LIMITED FETUS(S): CPT

## 2019-01-29 PROCEDURE — 86900 BLOOD TYPING SEROLOGIC ABO: CPT

## 2019-01-29 PROCEDURE — 80048 BASIC METABOLIC PNL TOTAL CA: CPT

## 2019-01-29 PROCEDURE — 36415 COLL VENOUS BLD VENIPUNCTURE: CPT

## 2019-01-29 PROCEDURE — 99283 EMERGENCY DEPT VISIT LOW MDM: CPT

## 2019-01-29 PROCEDURE — 85025 COMPLETE CBC W/AUTO DIFF WBC: CPT

## 2019-01-29 PROCEDURE — 76817 TRANSVAGINAL US OBSTETRIC: CPT

## 2019-01-29 PROCEDURE — 84702 CHORIONIC GONADOTROPIN TEST: CPT

## 2019-01-30 ENCOUNTER — TELEPHONE (OUTPATIENT)
Dept: NEUROLOGY | Age: 34
End: 2019-01-30

## 2019-01-30 LAB
ABO + RH BLD: NORMAL
APPEARANCE UR: ABNORMAL
BACTERIA URNS QL MICRO: ABNORMAL /HPF
BILIRUB UR QL: NEGATIVE
BLOOD GROUP ANTIBODIES SERPL: NORMAL
COLOR UR: ABNORMAL
EPITH CASTS URNS QL MICRO: ABNORMAL /LPF
GLUCOSE UR STRIP.AUTO-MCNC: NEGATIVE MG/DL
HGB UR QL STRIP: ABNORMAL
KETONES UR QL STRIP.AUTO: ABNORMAL MG/DL
LEUKOCYTE ESTERASE UR QL STRIP.AUTO: ABNORMAL
NITRITE UR QL STRIP.AUTO: NEGATIVE
PH UR STRIP: 6 [PH] (ref 5–8)
PROT UR STRIP-MCNC: 30 MG/DL
RBC #/AREA URNS HPF: >100 /HPF (ref 0–5)
SP GR UR REFRACTOMETRY: 1.02 (ref 1–1.03)
SPECIMEN EXP DATE BLD: NORMAL
UA: UC IF INDICATED,UAUC: ABNORMAL
UROBILINOGEN UR QL STRIP.AUTO: 1 EU/DL (ref 0.2–1)
WBC URNS QL MICRO: ABNORMAL /HPF (ref 0–4)

## 2019-01-30 PROCEDURE — 87086 URINE CULTURE/COLONY COUNT: CPT

## 2019-01-30 PROCEDURE — 81001 URINALYSIS AUTO W/SCOPE: CPT

## 2019-01-30 RX ORDER — NITROFURANTOIN 25; 75 MG/1; MG/1
100 CAPSULE ORAL 2 TIMES DAILY
Qty: 10 CAP | Refills: 0 | Status: SHIPPED | OUTPATIENT
Start: 2019-01-30 | End: 2019-02-04

## 2019-01-30 NOTE — ED PROVIDER NOTES
EMERGENCY DEPARTMENT HISTORY AND PHYSICAL EXAM 
 
 
Date: 2019 Patient Name: Akua Jacobs History of Presenting Illness Chief Complaint Patient presents with  Vaginal Bleeding  
  ambulatory to triage, states that she has tested positive for a home pregnancy test and started bleeding 2 days ago, no clots, no abdominal pain. No hx of miscarriages History Provided By: Patient HPI: Akua Jacobs, 36 y.o. female  with PMHx significant for GERD presents ambulatory to the ED with cc of vaginal bleeding. Patient states that she had positive pregnancy test at home and was planning on seeing an OB/GYN next week however yesterday began to have bleeding. Patient states that bleeding began as spotting then increased to bleeding soaking 3 pads per day. Denies any abdominal pain, cramping, dysuria, nausea, vomiting, diarrhea or recent illness. Has had previous pregnancy without any issues or complications. LMP early December/late November. There are no other complaints, changes, or physical findings at this time. PCP: Adalberto Fothergill, MD 
 
 
Past History Past Medical History: 
Past Medical History:  
Diagnosis Date  Asthma  Asthma  Gastrointestinal disorder   
 reflux  Multiple sclerosis ([facility] [address] Past Surgical History: 
Past Surgical History:  
Procedure Laterality Date  HX GYN  10/2013  
 c section  HX HEENT    
 tonsils, adenoids, PE tubes Family History: 
Family History Problem Relation Age of Onset  Heart Disease Mother  Hypertension Mother  Diabetes Mother  Seizures Maternal Grandmother Social History: 
Social History Tobacco Use  Smoking status: Never Smoker  Smokeless tobacco: Never Used Substance Use Topics  Alcohol use: Yes Comment: Rare  Drug use: Not on file Allergies: Allergies Allergen Reactions  Sulfa (Sulfonamide Antibiotics) Hives Review of Systems Review of Systems Constitutional: Negative for chills and fever. HENT: Negative for sore throat. Eyes: Negative for visual disturbance. Respiratory: Negative for choking, chest tightness, shortness of breath and wheezing. Cardiovascular: Negative for chest pain, palpitations and leg swelling. Gastrointestinal: Negative for abdominal pain, constipation, diarrhea, nausea and vomiting. Genitourinary: Positive for vaginal bleeding. Negative for dysuria, pelvic pain, vaginal discharge and vaginal pain. Musculoskeletal: Negative for joint swelling. Skin: Negative for rash. Neurological: Negative for headaches. Psychiatric/Behavioral: Negative for confusion. Physical Exam  
Physical Exam  
Constitutional: She is oriented to person, place, and time. She appears well-developed and well-nourished. HENT:  
Head: Normocephalic and atraumatic. Mouth/Throat: Oropharynx is clear and moist.  
Neck: Normal range of motion. Cardiovascular: Normal rate, regular rhythm, normal heart sounds and intact distal pulses. No murmur heard. Pulmonary/Chest: Effort normal and breath sounds normal. No respiratory distress. She has no wheezes. Abdominal: Soft. She exhibits no distension and no mass. There is no tenderness. Musculoskeletal: Normal range of motion. She exhibits no deformity. Neurological: She is alert and oriented to person, place, and time. Skin: Skin is warm and dry. No rash noted. Psychiatric: She has a normal mood and affect. Diagnostic Study Results Labs - Recent Results (from the past 12 hour(s)) CBC WITH AUTOMATED DIFF Collection Time: 01/29/19 10:51 PM  
Result Value Ref Range WBC 5.2 3.6 - 11.0 K/uL  
 RBC 3.69 (L) 3.80 - 5.20 M/uL  
 HGB 11.4 (L) 11.5 - 16.0 g/dL HCT 35.3 35.0 - 47.0 % MCV 95.7 80.0 - 99.0 FL  
 MCH 30.9 26.0 - 34.0 PG  
 MCHC 32.3 30.0 - 36.5 g/dL  
 RDW 13.3 11.5 - 14.5 % PLATELET 782 991 - 918 K/uL  MPV 11.0 8.9 - 12.9 FL  
 NRBC 0.0 0  WBC ABSOLUTE NRBC 0.00 0.00 - 0.01 K/uL NEUTROPHILS 50 32 - 75 % LYMPHOCYTES 35 12 - 49 % MONOCYTES 12 5 - 13 % EOSINOPHILS 1 0 - 7 % BASOPHILS 0 0 - 1 % IMMATURE GRANULOCYTES 0 0.0 - 0.5 % ABS. NEUTROPHILS 2.6 1.8 - 8.0 K/UL  
 ABS. LYMPHOCYTES 1.8 0.8 - 3.5 K/UL  
 ABS. MONOCYTES 0.6 0.0 - 1.0 K/UL  
 ABS. EOSINOPHILS 0.1 0.0 - 0.4 K/UL  
 ABS. BASOPHILS 0.0 0.0 - 0.1 K/UL  
 ABS. IMM. GRANS. 0.0 0.00 - 0.04 K/UL  
 DF AUTOMATED METABOLIC PANEL, BASIC Collection Time: 01/29/19 10:51 PM  
Result Value Ref Range Sodium 141 136 - 145 mmol/L Potassium 3.6 3.5 - 5.1 mmol/L Chloride 106 97 - 108 mmol/L  
 CO2 29 21 - 32 mmol/L Anion gap 6 5 - 15 mmol/L Glucose 84 65 - 100 mg/dL BUN 15 6 - 20 MG/DL Creatinine 0.74 0.55 - 1.02 MG/DL  
 BUN/Creatinine ratio 20 12 - 20 GFR est AA >60 >60 ml/min/1.73m2 GFR est non-AA >60 >60 ml/min/1.73m2 Calcium 8.7 8.5 - 10.1 MG/DL  
TOTAL HCG, QT. Collection Time: 01/29/19 10:51 PM  
Result Value Ref Range Beta HCG,  (H) 0 - 6 MIU/ML  
URINALYSIS W/ REFLEX CULTURE Collection Time: 01/30/19 12:13 AM  
Result Value Ref Range Color DARK YELLOW Appearance CLOUDY (A) CLEAR Specific gravity 1.022 1.003 - 1.030    
 pH (UA) 6.0 5.0 - 8.0 Protein 30 (A) NEG mg/dL Glucose NEGATIVE  NEG mg/dL Ketone TRACE (A) NEG mg/dL Bilirubin NEGATIVE  NEG Blood LARGE (A) NEG Urobilinogen 1.0 0.2 - 1.0 EU/dL Nitrites NEGATIVE  NEG Leukocyte Esterase SMALL (A) NEG    
 WBC 10-20 0 - 4 /hpf  
 RBC >100 (H) 0 - 5 /hpf Epithelial cells FEW FEW /lpf Bacteria 1+ (A) NEG /hpf  
 UA:UC IF INDICATED URINE CULTURE ORDERED (A) CNI Radiologic Studies -  
US PREG UTS LTD Final Result IMPRESSION:  Uterus is normal in size. Left ovary is within normal limits. Right  
ovary is obscured by bowel gas.  Transvaginal sonography will be performed to  
 better evaluate. Please see separate report. TRANSVAGINAL  
  
INDICATION: Bleeding pregnancy. COMPARISON: None. TECHNIQUE: Transvaginal sonography was performed with multiple static images of  
the uterus and ovariesobtained. FINDINGS:   
  
UTERUS:  
The uterus is normal. Uterus measures 7.1 x 4.2 x 3.6 cm. ENDOMETRIUM:  
The endometrial stripe measures 4mm. No intrauterine gestational sac is  
identified. RIGHT OVARY:  
The right ovary is normal. The right ovary measures 3.5 x 2.8 x 1.4 cm. There  
is normal color flow to the right ovary. LEFT OVARY:  
The left ovary is normal. The left ovary measures 3 x 2.2 x 1.8 cm. There is  
normal color flow to the left ovary. There are prominent vessels in the left  
adnexa CUL-DE-SAC:  
There is a small amount of fluid in the cul-de-sac extending towards the right  
adnexa. IMPRESSION:   
1. Endometrial stripe measures 4 mm. No intrauterine gestational sac is  
identified. Ovaries appear unremarkable. There is a small amount of free fluid  
in the cul-de-sac. Findings could be related to a gestational age to early to  
detect with ultrasound although ectopic or miscarriage is not excluded and  
follow-up ultrasound is recommended in 5-7 days. [address] Final Result IMPRESSION:  Uterus is normal in size. Left ovary is within normal limits. Right  
ovary is obscured by bowel gas. Transvaginal sonography will be performed to  
better evaluate. Please see separate report. TRANSVAGINAL  
  
INDICATION: Bleeding pregnancy. COMPARISON: None. TECHNIQUE: Transvaginal sonography was performed with multiple static images of  
the uterus and ovariesobtained. FINDINGS:   
  
UTERUS:  
The uterus is normal. Uterus measures 7.1 x 4.2 x 3.6 cm. ENDOMETRIUM:  
The endometrial stripe measures 4mm. No intrauterine gestational sac is  
identified.   
  
RIGHT OVARY:  
 The right ovary is normal. The right ovary measures 3.5 x 2.8 x 1.4 cm. There  
is normal color flow to the right ovary. LEFT OVARY:  
The left ovary is normal. The left ovary measures 3 x 2.2 x 1.8 cm. There is  
normal color flow to the left ovary. There are prominent vessels in the left  
adnexa CUL-DE-SAC:  
There is a small amount of fluid in the cul-de-sac extending towards the right  
adnexa. IMPRESSION:   
1. Endometrial stripe measures 4 mm. No intrauterine gestational sac is  
identified. Ovaries appear unremarkable. There is a small amount of free fluid  
in the cul-de-sac. Findings could be related to a gestational age to early to  
detect with ultrasound although ectopic or miscarriage is not excluded and  
follow-up ultrasound is recommended in 5-7 days. CT Results  (Last 48 hours) None CXR Results  (Last 48 hours) None Medical Decision Making I am the first provider for this patient. I reviewed the vital signs, available nursing notes, past medical history, past surgical history, family history and social history. Vital Signs-Reviewed the patient's vital signs. Patient Vitals for the past 12 hrs: 
 Temp Pulse Resp BP SpO2  
19 2139 98.3 °F (36.8 °C) 75 18 105/67 100 % Pulse Oximetry Analysis - 100% on RA Cardiac Monitor:  
Rate: 72 bpm 
Rhythm: Normal Sinus Rhythm Records Reviewed: Nursing Notes and Old Medical Records Provider Notes (Medical Decision Making):  
Patient reports positive pregnancy test at home with painless vaginal bleeding. Vitals stable during evaluation. Ddx includes bleeding in pregnancy, placenta previous, spontaneous , ectopic pregnancy. Will plan to obtain labs, will obtain ultrasound as well. Given most recent period in early December/late November, will plan to obtain transabdominal ultrasound. ED Course:  
Initial assessment performed.  The patients presenting problems have been discussed, and they are in agreement with the care plan formulated and outlined with them. I have encouraged them to ask questions as they arise throughout their visit. PROGRESS NOTE: 
12:02 AM 
US and labwork consistent with likely inevitable spontaneous  and/or missed spontaneous . Will plan to have patient follow up with OB/GYN to ensure passage of POC. Discussed expectant management with patient and , both understand and agree with plan at this time. DISCHARGE NOTE 
12:03 AM 
The patient has been re-evaluated and is ready for discharge. Reviewed available results with patient. Counseled pt on diagnosis and care plan. Pt has expressed understanding, and all questions have been answered. Pt agrees with plan and agrees to F/U as recommended, or return to the ED if their sxs worsen. Discharge instructions have been provided and explained to the pt, along with reasons to return to the ED. Disposition: 
Home PLAN: 
1. Current Discharge Medication List  
  
START taking these medications Details  
nitrofurantoin, macrocrystal-monohydrate, (MACROBID) 100 mg capsule Take 1 Cap by mouth two (2) times a day for 5 days. Qty: 10 Cap, Refills: 0  
  
  
 
2. Follow-up Information Follow up With Specialties Details Why Contact Info [facility]   for follow up blood work and Bret Valente [id number] Return to ED if worse Diagnosis Clinical Impression: 1. Vaginal bleeding in pregnancy, first trimester Attestations:

## 2019-01-30 NOTE — TELEPHONE ENCOUNTER
I called the patient to discuss her missed appointment this morning. She is having some complications with her pregnancy she believes and had a bad night last night. I advised her the per Dr Ashu Bruce, she does not need to worry about coming in to the office right now as long as she is off her MS medications: Tecfidera, Clemastine which she is. I advised her to concentrate on her pregnancy and to call me back with an update when she can.

## 2019-01-31 LAB
BACTERIA SPEC CULT: NORMAL
CC UR VC: NORMAL
SERVICE CMNT-IMP: NORMAL

## 2019-02-14 ENCOUNTER — ANESTHESIA (OUTPATIENT)
Dept: SURGERY | Age: 34
End: 2019-02-14
Payer: COMMERCIAL

## 2019-02-14 ENCOUNTER — ANESTHESIA EVENT (OUTPATIENT)
Dept: SURGERY | Age: 34
End: 2019-02-14
Payer: COMMERCIAL

## 2019-02-14 ENCOUNTER — APPOINTMENT (OUTPATIENT)
Dept: ULTRASOUND IMAGING | Age: 34
End: 2019-02-14
Attending: EMERGENCY MEDICINE
Payer: COMMERCIAL

## 2019-02-14 ENCOUNTER — HOSPITAL ENCOUNTER (EMERGENCY)
Age: 34
Discharge: HOME | End: 2019-02-14
Attending: EMERGENCY MEDICINE
Payer: COMMERCIAL

## 2019-02-14 VITALS
TEMPERATURE: 98 F | RESPIRATION RATE: 16 BRPM | HEART RATE: 63 BPM | WEIGHT: 126.32 LBS | HEIGHT: 64 IN | OXYGEN SATURATION: 100 % | SYSTOLIC BLOOD PRESSURE: 98 MMHG | DIASTOLIC BLOOD PRESSURE: 59 MMHG | BODY MASS INDEX: 21.57 KG/M2

## 2019-02-14 DIAGNOSIS — G89.18 POST-OP PAIN: ICD-10-CM

## 2019-02-14 DIAGNOSIS — O00.201 RIGHT OVARIAN PREGNANCY WITHOUT INTRAUTERINE PREGNANCY: Primary | ICD-10-CM

## 2019-02-14 PROBLEM — O00.90 ECTOPIC PREGNANCY: Status: ACTIVE | Noted: 2019-02-14

## 2019-02-14 LAB
ABO + RH BLD: NORMAL
ALBUMIN SERPL-MCNC: 3.8 G/DL (ref 3.5–5)
ALBUMIN/GLOB SERPL: 1 {RATIO} (ref 1.1–2.2)
ALP SERPL-CCNC: 63 U/L (ref 45–117)
ALT SERPL-CCNC: 15 U/L (ref 12–78)
ANION GAP SERPL CALC-SCNC: 6 MMOL/L (ref 5–15)
APPEARANCE UR: ABNORMAL
AST SERPL-CCNC: 12 U/L (ref 15–37)
BACTERIA URNS QL MICRO: ABNORMAL /HPF
BASOPHILS # BLD: 0 K/UL (ref 0–0.1)
BASOPHILS NFR BLD: 0 % (ref 0–1)
BILIRUB SERPL-MCNC: 0.7 MG/DL (ref 0.2–1)
BILIRUB UR QL: NEGATIVE
BLOOD GROUP ANTIBODIES SERPL: NORMAL
BUN SERPL-MCNC: 17 MG/DL (ref 6–20)
BUN/CREAT SERPL: 27 (ref 12–20)
CALCIUM SERPL-MCNC: 8.3 MG/DL (ref 8.5–10.1)
CHLORIDE SERPL-SCNC: 106 MMOL/L (ref 97–108)
CO2 SERPL-SCNC: 27 MMOL/L (ref 21–32)
COLOR UR: ABNORMAL
CREAT SERPL-MCNC: 0.63 MG/DL (ref 0.55–1.02)
DIFFERENTIAL METHOD BLD: NORMAL
EOSINOPHIL # BLD: 0 K/UL (ref 0–0.4)
EOSINOPHIL NFR BLD: 1 % (ref 0–7)
EPITH CASTS URNS QL MICRO: ABNORMAL /LPF
ERYTHROCYTE [DISTWIDTH] IN BLOOD BY AUTOMATED COUNT: 13.1 % (ref 11.5–14.5)
GLOBULIN SER CALC-MCNC: 4 G/DL (ref 2–4)
GLUCOSE SERPL-MCNC: 77 MG/DL (ref 65–100)
GLUCOSE UR STRIP.AUTO-MCNC: NEGATIVE MG/DL
HCT VFR BLD AUTO: 35.9 % (ref 35–47)
HGB BLD-MCNC: 11.7 G/DL (ref 11.5–16)
HGB UR QL STRIP: ABNORMAL
IMM GRANULOCYTES # BLD AUTO: 0 K/UL (ref 0–0.04)
IMM GRANULOCYTES NFR BLD AUTO: 0 % (ref 0–0.5)
KETONES UR QL STRIP.AUTO: ABNORMAL MG/DL
LACTATE SERPL-SCNC: 0.9 MMOL/L (ref 0.4–2)
LEUKOCYTE ESTERASE UR QL STRIP.AUTO: ABNORMAL
LYMPHOCYTES # BLD: 1.2 K/UL (ref 0.8–3.5)
LYMPHOCYTES NFR BLD: 20 % (ref 12–49)
MCH RBC QN AUTO: 30.2 PG (ref 26–34)
MCHC RBC AUTO-ENTMCNC: 32.6 G/DL (ref 30–36.5)
MCV RBC AUTO: 92.8 FL (ref 80–99)
MONOCYTES # BLD: 0.6 K/UL (ref 0–1)
MONOCYTES NFR BLD: 10 % (ref 5–13)
MUCOUS THREADS URNS QL MICRO: ABNORMAL /LPF
NEUTS SEG # BLD: 3.9 K/UL (ref 1.8–8)
NEUTS SEG NFR BLD: 69 % (ref 32–75)
NITRITE UR QL STRIP.AUTO: NEGATIVE
NRBC # BLD: 0 K/UL (ref 0–0.01)
NRBC BLD-RTO: 0 PER 100 WBC
PH UR STRIP: 7.5 [PH] (ref 5–8)
PLATELET # BLD AUTO: 177 K/UL (ref 150–400)
PMV BLD AUTO: 10.8 FL (ref 8.9–12.9)
POTASSIUM SERPL-SCNC: 3.9 MMOL/L (ref 3.5–5.1)
PROT SERPL-MCNC: 7.8 G/DL (ref 6.4–8.2)
PROT UR STRIP-MCNC: ABNORMAL MG/DL
RBC # BLD AUTO: 3.87 M/UL (ref 3.8–5.2)
RBC #/AREA URNS HPF: ABNORMAL /HPF (ref 0–5)
SODIUM SERPL-SCNC: 139 MMOL/L (ref 136–145)
SP GR UR REFRACTOMETRY: 1.02 (ref 1–1.03)
SPECIMEN EXP DATE BLD: NORMAL
UROBILINOGEN UR QL STRIP.AUTO: 1 EU/DL (ref 0.2–1)
WBC # BLD AUTO: 5.7 K/UL (ref 3.6–11)
WBC URNS QL MICRO: ABNORMAL /HPF (ref 0–4)

## 2019-02-14 PROCEDURE — 80053 COMPREHEN METABOLIC PANEL: CPT

## 2019-02-14 PROCEDURE — 77030019908 HC STETH ESOPH SIMS -A: Performed by: ANESTHESIOLOGY

## 2019-02-14 PROCEDURE — 77030008603 HC TRCR ENDOSC EPATH J&J -C: Performed by: OBSTETRICS & GYNECOLOGY

## 2019-02-14 PROCEDURE — 36415 COLL VENOUS BLD VENIPUNCTURE: CPT

## 2019-02-14 PROCEDURE — 88305 TISSUE EXAM BY PATHOLOGIST: CPT

## 2019-02-14 PROCEDURE — 77030009852 HC PCH RTVR ENDOSC COVD -B: Performed by: OBSTETRICS & GYNECOLOGY

## 2019-02-14 PROCEDURE — 77030008756 HC TU IRR SUC STRY -B: Performed by: OBSTETRICS & GYNECOLOGY

## 2019-02-14 PROCEDURE — 77030018836 HC SOL IRR NACL ICUM -A: Performed by: OBSTETRICS & GYNECOLOGY

## 2019-02-14 PROCEDURE — 77030034849: Performed by: OBSTETRICS & GYNECOLOGY

## 2019-02-14 PROCEDURE — 77030039266 HC ADH SKN EXOFIN S2SG -A: Performed by: OBSTETRICS & GYNECOLOGY

## 2019-02-14 PROCEDURE — 76830 TRANSVAGINAL US NON-OB: CPT

## 2019-02-14 PROCEDURE — 74011250636 HC RX REV CODE- 250/636: Performed by: EMERGENCY MEDICINE

## 2019-02-14 PROCEDURE — 85025 COMPLETE CBC W/AUTO DIFF WBC: CPT

## 2019-02-14 PROCEDURE — 74011250636 HC RX REV CODE- 250/636

## 2019-02-14 PROCEDURE — 74011000250 HC RX REV CODE- 250: Performed by: OBSTETRICS & GYNECOLOGY

## 2019-02-14 PROCEDURE — 77030008602 HC TRCR ENDOSC EPATH J&J -B: Performed by: OBSTETRICS & GYNECOLOGY

## 2019-02-14 PROCEDURE — 77030032490 HC SLV COMPR SCD KNE COVD -B: Performed by: OBSTETRICS & GYNECOLOGY

## 2019-02-14 PROCEDURE — 81001 URINALYSIS AUTO W/SCOPE: CPT

## 2019-02-14 PROCEDURE — 74011000250 HC RX REV CODE- 250

## 2019-02-14 PROCEDURE — 76210000020 HC REC RM PH II FIRST 0.5 HR: Performed by: OBSTETRICS & GYNECOLOGY

## 2019-02-14 PROCEDURE — 76210000016 HC OR PH I REC 1 TO 1.5 HR: Performed by: OBSTETRICS & GYNECOLOGY

## 2019-02-14 PROCEDURE — 77030026438 HC STYL ET INTUB CARD -A: Performed by: ANESTHESIOLOGY

## 2019-02-14 PROCEDURE — 77030020782 HC GWN BAIR PAWS FLX 3M -B

## 2019-02-14 PROCEDURE — 77030033639 HC SHR ENDO COAG HARM 36 J&J -E: Performed by: OBSTETRICS & GYNECOLOGY

## 2019-02-14 PROCEDURE — 76060000033 HC ANESTHESIA 1 TO 1.5 HR: Performed by: OBSTETRICS & GYNECOLOGY

## 2019-02-14 PROCEDURE — 99284 EMERGENCY DEPT VISIT MOD MDM: CPT

## 2019-02-14 PROCEDURE — 76010000149 HC OR TIME 1 TO 1.5 HR: Performed by: OBSTETRICS & GYNECOLOGY

## 2019-02-14 PROCEDURE — 86900 BLOOD TYPING SEROLOGIC ABO: CPT

## 2019-02-14 PROCEDURE — 74011250637 HC RX REV CODE- 250/637: Performed by: OBSTETRICS & GYNECOLOGY

## 2019-02-14 PROCEDURE — 77030008684 HC TU ET CUF COVD -B: Performed by: ANESTHESIOLOGY

## 2019-02-14 PROCEDURE — 74011250636 HC RX REV CODE- 250/636: Performed by: ANESTHESIOLOGY

## 2019-02-14 PROCEDURE — 83605 ASSAY OF LACTIC ACID: CPT

## 2019-02-14 PROCEDURE — 77030011640 HC PAD GRND REM COVD -A: Performed by: OBSTETRICS & GYNECOLOGY

## 2019-02-14 PROCEDURE — 77030031139 HC SUT VCRL2 J&J -A: Performed by: OBSTETRICS & GYNECOLOGY

## 2019-02-14 PROCEDURE — 96360 HYDRATION IV INFUSION INIT: CPT

## 2019-02-14 RX ORDER — GLYCOPYRROLATE 0.2 MG/ML
INJECTION INTRAMUSCULAR; INTRAVENOUS AS NEEDED
Status: DISCONTINUED | OUTPATIENT
Start: 2019-02-14 | End: 2019-02-14 | Stop reason: HOSPADM

## 2019-02-14 RX ORDER — ROCURONIUM BROMIDE 10 MG/ML
INJECTION, SOLUTION INTRAVENOUS AS NEEDED
Status: DISCONTINUED | OUTPATIENT
Start: 2019-02-14 | End: 2019-02-14 | Stop reason: HOSPADM

## 2019-02-14 RX ORDER — SODIUM CHLORIDE, SODIUM LACTATE, POTASSIUM CHLORIDE, CALCIUM CHLORIDE 600; 310; 30; 20 MG/100ML; MG/100ML; MG/100ML; MG/100ML
25 INJECTION, SOLUTION INTRAVENOUS CONTINUOUS
Status: DISCONTINUED | OUTPATIENT
Start: 2019-02-14 | End: 2019-02-14 | Stop reason: HOSPADM

## 2019-02-14 RX ORDER — SODIUM CHLORIDE 0.9 % (FLUSH) 0.9 %
5-40 SYRINGE (ML) INJECTION AS NEEDED
Status: DISCONTINUED | OUTPATIENT
Start: 2019-02-14 | End: 2019-02-14 | Stop reason: HOSPADM

## 2019-02-14 RX ORDER — OXYCODONE AND ACETAMINOPHEN 5; 325 MG/1; MG/1
1-2 TABLET ORAL
Status: DISCONTINUED | OUTPATIENT
Start: 2019-02-14 | End: 2019-02-14 | Stop reason: HOSPADM

## 2019-02-14 RX ORDER — ACETAMINOPHEN 10 MG/ML
INJECTION, SOLUTION INTRAVENOUS AS NEEDED
Status: DISCONTINUED | OUTPATIENT
Start: 2019-02-14 | End: 2019-02-14 | Stop reason: HOSPADM

## 2019-02-14 RX ORDER — MIDAZOLAM HYDROCHLORIDE 1 MG/ML
INJECTION, SOLUTION INTRAMUSCULAR; INTRAVENOUS AS NEEDED
Status: DISCONTINUED | OUTPATIENT
Start: 2019-02-14 | End: 2019-02-14 | Stop reason: HOSPADM

## 2019-02-14 RX ORDER — FENTANYL CITRATE 50 UG/ML
25 INJECTION, SOLUTION INTRAMUSCULAR; INTRAVENOUS
Status: DISCONTINUED | OUTPATIENT
Start: 2019-02-14 | End: 2019-02-14 | Stop reason: HOSPADM

## 2019-02-14 RX ORDER — DEXAMETHASONE SODIUM PHOSPHATE 4 MG/ML
INJECTION, SOLUTION INTRA-ARTICULAR; INTRALESIONAL; INTRAMUSCULAR; INTRAVENOUS; SOFT TISSUE AS NEEDED
Status: DISCONTINUED | OUTPATIENT
Start: 2019-02-14 | End: 2019-02-14 | Stop reason: HOSPADM

## 2019-02-14 RX ORDER — NEOSTIGMINE METHYLSULFATE 1 MG/ML
INJECTION INTRAVENOUS AS NEEDED
Status: DISCONTINUED | OUTPATIENT
Start: 2019-02-14 | End: 2019-02-14 | Stop reason: HOSPADM

## 2019-02-14 RX ORDER — PHENYLEPHRINE HCL IN 0.9% NACL 0.4MG/10ML
SYRINGE (ML) INTRAVENOUS AS NEEDED
Status: DISCONTINUED | OUTPATIENT
Start: 2019-02-14 | End: 2019-02-14 | Stop reason: HOSPADM

## 2019-02-14 RX ORDER — LIDOCAINE HYDROCHLORIDE 10 MG/ML
0.1 INJECTION, SOLUTION EPIDURAL; INFILTRATION; INTRACAUDAL; PERINEURAL AS NEEDED
Status: DISCONTINUED | OUTPATIENT
Start: 2019-02-14 | End: 2019-02-14 | Stop reason: HOSPADM

## 2019-02-14 RX ORDER — LIDOCAINE HYDROCHLORIDE 20 MG/ML
INJECTION, SOLUTION EPIDURAL; INFILTRATION; INTRACAUDAL; PERINEURAL AS NEEDED
Status: DISCONTINUED | OUTPATIENT
Start: 2019-02-14 | End: 2019-02-14 | Stop reason: HOSPADM

## 2019-02-14 RX ORDER — SODIUM CHLORIDE 9 MG/ML
1000 INJECTION, SOLUTION INTRAVENOUS ONCE
Status: COMPLETED | OUTPATIENT
Start: 2019-02-14 | End: 2019-02-14

## 2019-02-14 RX ORDER — SUCCINYLCHOLINE CHLORIDE 20 MG/ML
INJECTION INTRAMUSCULAR; INTRAVENOUS AS NEEDED
Status: DISCONTINUED | OUTPATIENT
Start: 2019-02-14 | End: 2019-02-14 | Stop reason: HOSPADM

## 2019-02-14 RX ORDER — ONDANSETRON 2 MG/ML
INJECTION INTRAMUSCULAR; INTRAVENOUS AS NEEDED
Status: DISCONTINUED | OUTPATIENT
Start: 2019-02-14 | End: 2019-02-14 | Stop reason: HOSPADM

## 2019-02-14 RX ORDER — SODIUM CHLORIDE 0.9 % (FLUSH) 0.9 %
5-40 SYRINGE (ML) INJECTION EVERY 8 HOURS
Status: DISCONTINUED | OUTPATIENT
Start: 2019-02-14 | End: 2019-02-14 | Stop reason: HOSPADM

## 2019-02-14 RX ORDER — OXYCODONE AND ACETAMINOPHEN 5; 325 MG/1; MG/1
1-2 TABLET ORAL
Qty: 30 TAB | Refills: 0 | Status: SHIPPED | OUTPATIENT
Start: 2019-02-14 | End: 2019-10-07 | Stop reason: ALTCHOICE

## 2019-02-14 RX ORDER — PROPOFOL 10 MG/ML
INJECTION, EMULSION INTRAVENOUS AS NEEDED
Status: DISCONTINUED | OUTPATIENT
Start: 2019-02-14 | End: 2019-02-14 | Stop reason: HOSPADM

## 2019-02-14 RX ORDER — IBUPROFEN 600 MG/1
600 TABLET ORAL
Qty: 30 TAB | Refills: 0 | Status: SHIPPED | OUTPATIENT
Start: 2019-02-14 | End: 2020-02-09

## 2019-02-14 RX ORDER — HYDROMORPHONE HYDROCHLORIDE 1 MG/ML
0.2 INJECTION, SOLUTION INTRAMUSCULAR; INTRAVENOUS; SUBCUTANEOUS
Status: DISCONTINUED | OUTPATIENT
Start: 2019-02-14 | End: 2019-02-14 | Stop reason: HOSPADM

## 2019-02-14 RX ORDER — ALBUTEROL SULFATE 90 UG/1
AEROSOL, METERED RESPIRATORY (INHALATION)
COMMUNITY

## 2019-02-14 RX ORDER — FENTANYL CITRATE 50 UG/ML
INJECTION, SOLUTION INTRAMUSCULAR; INTRAVENOUS AS NEEDED
Status: DISCONTINUED | OUTPATIENT
Start: 2019-02-14 | End: 2019-02-14 | Stop reason: HOSPADM

## 2019-02-14 RX ORDER — DIPHENHYDRAMINE HYDROCHLORIDE 50 MG/ML
12.5 INJECTION, SOLUTION INTRAMUSCULAR; INTRAVENOUS AS NEEDED
Status: DISCONTINUED | OUTPATIENT
Start: 2019-02-14 | End: 2019-02-14 | Stop reason: HOSPADM

## 2019-02-14 RX ORDER — BUPIVACAINE HYDROCHLORIDE 5 MG/ML
INJECTION, SOLUTION EPIDURAL; INTRACAUDAL AS NEEDED
Status: DISCONTINUED | OUTPATIENT
Start: 2019-02-14 | End: 2019-02-14 | Stop reason: HOSPADM

## 2019-02-14 RX ADMIN — LIDOCAINE HYDROCHLORIDE 80 MG: 20 INJECTION, SOLUTION EPIDURAL; INFILTRATION; INTRACAUDAL; PERINEURAL at 15:15

## 2019-02-14 RX ADMIN — SUCCINYLCHOLINE CHLORIDE 100 MG: 20 INJECTION INTRAMUSCULAR; INTRAVENOUS at 15:15

## 2019-02-14 RX ADMIN — ONDANSETRON 4 MG: 2 INJECTION INTRAMUSCULAR; INTRAVENOUS at 15:30

## 2019-02-14 RX ADMIN — OXYCODONE AND ACETAMINOPHEN 1 TABLET: 5; 325 TABLET ORAL at 17:27

## 2019-02-14 RX ADMIN — ROCURONIUM BROMIDE 10 MG: 10 INJECTION, SOLUTION INTRAVENOUS at 15:15

## 2019-02-14 RX ADMIN — DEXAMETHASONE SODIUM PHOSPHATE 8 MG: 4 INJECTION, SOLUTION INTRA-ARTICULAR; INTRALESIONAL; INTRAMUSCULAR; INTRAVENOUS; SOFT TISSUE at 15:30

## 2019-02-14 RX ADMIN — NEOSTIGMINE METHYLSULFATE 3 MG: 1 INJECTION INTRAVENOUS at 16:06

## 2019-02-14 RX ADMIN — SODIUM CHLORIDE, SODIUM LACTATE, POTASSIUM CHLORIDE, AND CALCIUM CHLORIDE 25 ML/HR: 600; 310; 30; 20 INJECTION, SOLUTION INTRAVENOUS at 14:00

## 2019-02-14 RX ADMIN — SUCCINYLCHOLINE CHLORIDE 20 MG: 20 INJECTION INTRAMUSCULAR; INTRAVENOUS at 15:30

## 2019-02-14 RX ADMIN — Medication 40 MCG: at 15:40

## 2019-02-14 RX ADMIN — Medication 80 MCG: at 15:30

## 2019-02-14 RX ADMIN — GLYCOPYRROLATE 0.4 MG: 0.2 INJECTION INTRAMUSCULAR; INTRAVENOUS at 16:06

## 2019-02-14 RX ADMIN — FENTANYL CITRATE 100 MCG: 50 INJECTION, SOLUTION INTRAMUSCULAR; INTRAVENOUS at 15:15

## 2019-02-14 RX ADMIN — FENTANYL CITRATE 25 MCG: 50 INJECTION, SOLUTION INTRAMUSCULAR; INTRAVENOUS at 17:12

## 2019-02-14 RX ADMIN — ACETAMINOPHEN 1000 MG: 10 INJECTION, SOLUTION INTRAVENOUS at 15:30

## 2019-02-14 RX ADMIN — SODIUM CHLORIDE 1000 ML: 900 INJECTION, SOLUTION INTRAVENOUS at 11:21

## 2019-02-14 RX ADMIN — MIDAZOLAM HYDROCHLORIDE 2 MG: 1 INJECTION, SOLUTION INTRAMUSCULAR; INTRAVENOUS at 15:07

## 2019-02-14 RX ADMIN — PROPOFOL 150 MG: 10 INJECTION, EMULSION INTRAVENOUS at 15:15

## 2019-02-14 NOTE — ED PROVIDER NOTES
EMERGENCY DEPARTMENT HISTORY AND PHYSICAL EXAM 
 
 
Date: 2019 Patient Name: David Jones History of Presenting Illness Chief Complaint Patient presents with  Abdominal Pain Ambulatory into the ED with c/o lower abdominal pain-was given methotrexate on  d/t ectopic pregnancy. Denies vaginal bleeding. History Provided By: Patient HPI: David Jones, 36 y.o. female A3 with PMHx significant for asthma, reflux, and MS, presents ambulatory to the ED with cc of new onset RLQ pain since last night, but worsening this morning. Pt describes the pain as a deep, aching pressure with associated nausea. Per chart review, pt was seen at [facility] ED on  for bleeding during first trimester pregnancy with unremarkable transvaginal US. She states she f/u with OB/GYN and dx with ectopic pregnancy, pt unsure on which side, and received Methotrexate injections last Tuesday, . She denies any prior abdominal surgeries. Of note, pt reports taking Tecfidera for hx of MS, however was advised note to take it prior to the Methotrexate. She specifically denies any vomiting or vaginal bleeding. There are no other complaints, changes, or physical findings at this time. PCP: Katherine Muñoz MD 
 
No current facility-administered medications on file prior to encounter. Current Outpatient Medications on File Prior to Encounter Medication Sig Dispense Refill  albuterol (PROVENTIL HFA, VENTOLIN HFA, PROAIR HFA) 90 mcg/actuation inhaler albuterol (refill) 90 mcg/actuation aerosol inhaler Inhale 1 puff every 4 hours by inhalation route.  clemastine 2.68 mg tab Take 2.68 mg by mouth two (2) times a day. 60 Tab 11  
 dimethyl fumarate (TECFIDERA) 240 mg cpDR Take 240 mg by mouth two (2) times a day. 60 Tab 11  
 aspirin delayed-release 81 mg tablet Take  by mouth daily.  cholecalciferol, vitamin D3, (VITAMIN D3) 2,000 unit tab Take  by mouth.  b complex vitamins (B COMPLEX 1) tablet Take 1 Tab by mouth daily.  ibuprofen (MOTRIN) 800 mg tablet Take 1 Tab by mouth every eight (8) hours as needed for Pain. 100 Tab 11  
 medroxyPROGESTERone (DEPO-PROVERA) 150 mg/mL syrg 150 mg by IntraMUSCular route once.  tiZANidine (ZANAFLEX) 4 mg tablet Take 1 Tab by mouth three (3) times daily as needed. 100 Tab 11  
 albuterol (PROVENTIL, VENTOLIN) 90 mcg/Actuation inhaler Take 1 Puff by inhalation every six (6) hours as needed.  fluticasone (FLONASE) 50 mcg/Actuation nasal spray 2 Sprays by Nasal route daily. Past History Past Medical History: 
Past Medical History:  
Diagnosis Date  Asthma  Asthma  Gastrointestinal disorder   
 reflux  GERD (gastroesophageal reflux disease)  Multiple sclerosis ([facility] [address] Past Surgical History: 
Past Surgical History:  
Procedure Laterality Date  HX GYN  10/2013  
 c section  HX HEENT    
 tonsils, adenoids, PE tubes Family History: 
Family History Problem Relation Age of Onset  Heart Disease Mother  Hypertension Mother  Diabetes Mother  Seizures Maternal Grandmother Social History: 
Social History Tobacco Use  Smoking status: Never Smoker  Smokeless tobacco: Never Used Substance Use Topics  Alcohol use: Yes Frequency: Never Comment: Rare  Drug use: No  
 
 
Allergies: Allergies Allergen Reactions  Sulfa (Sulfonamide Antibiotics) Hives Review of Systems Review of Systems Constitutional: Negative for chills and fever. HENT: Negative for congestion. Eyes: Negative for visual disturbance. Respiratory: Negative for chest tightness. Cardiovascular: Negative for chest pain and leg swelling. Gastrointestinal: Positive for abdominal pain (RLQ) and nausea. Negative for vomiting. Endocrine: Negative for polyuria. Genitourinary: Negative for dysuria, frequency and vaginal bleeding. Musculoskeletal: Negative for myalgias. Skin: Negative for color change. Allergic/Immunologic: Negative for immunocompromised state. Neurological: Negative for numbness. Physical Exam  
Physical Exam  
Nursing note and vitals reviewed. General appearance: non-toxic, mildly distressed Eyes: PERRL, EOMI, minimal conjunctival pallor, anicteric sclera HEENT: mucous membranes moist, oropharynx is clear Pulmonary: clear to auscultation bilaterally Cardiac: normal rate and regular rhythm, no murmurs, gallops, or rubs, 2+DP pulses, 2+ radial pulses Abdomen: soft, epigastric and RLQ tenderness, no rebound, not rigid, nondistended, bowel sounds present, no CVA tenderness MSK: no pre-tibial edema Neuro: Alert, answers questions appropriately Skin: capillary refill brisk Diagnostic Study Results Labs - Recent Results (from the past 12 hour(s)) CBC WITH AUTOMATED DIFF Collection Time: 02/14/19 11:25 AM  
Result Value Ref Range WBC 5.7 3.6 - 11.0 K/uL  
 RBC 3.87 3.80 - 5.20 M/uL  
 HGB 11.7 11.5 - 16.0 g/dL HCT 35.9 35.0 - 47.0 % MCV 92.8 80.0 - 99.0 FL  
 MCH 30.2 26.0 - 34.0 PG  
 MCHC 32.6 30.0 - 36.5 g/dL  
 RDW 13.1 11.5 - 14.5 % PLATELET 181 560 - 471 K/uL MPV 10.8 8.9 - 12.9 FL  
 NRBC 0.0 0  WBC ABSOLUTE NRBC 0.00 0.00 - 0.01 K/uL NEUTROPHILS 69 32 - 75 % LYMPHOCYTES 20 12 - 49 % MONOCYTES 10 5 - 13 % EOSINOPHILS 1 0 - 7 % BASOPHILS 0 0 - 1 % IMMATURE GRANULOCYTES 0 0.0 - 0.5 % ABS. NEUTROPHILS 3.9 1.8 - 8.0 K/UL  
 ABS. LYMPHOCYTES 1.2 0.8 - 3.5 K/UL  
 ABS. MONOCYTES 0.6 0.0 - 1.0 K/UL  
 ABS. EOSINOPHILS 0.0 0.0 - 0.4 K/UL  
 ABS. BASOPHILS 0.0 0.0 - 0.1 K/UL  
 ABS. IMM. GRANS. 0.0 0.00 - 0.04 K/UL  
 DF AUTOMATED METABOLIC PANEL, COMPREHENSIVE Collection Time: 02/14/19 11:25 AM  
Result Value Ref Range Sodium 139 136 - 145 mmol/L Potassium 3.9 3.5 - 5.1 mmol/L  Chloride 106 97 - 108 mmol/L  
 CO2 27 21 - 32 mmol/L  
 Anion gap 6 5 - 15 mmol/L Glucose 77 65 - 100 mg/dL BUN 17 6 - 20 MG/DL Creatinine 0.63 0.55 - 1.02 MG/DL  
 BUN/Creatinine ratio 27 (H) 12 - 20 GFR est AA >60 >60 ml/min/1.73m2 GFR est non-AA >60 >60 ml/min/1.73m2 Calcium 8.3 (L) 8.5 - 10.1 MG/DL Bilirubin, total 0.7 0.2 - 1.0 MG/DL  
 ALT (SGPT) 15 12 - 78 U/L  
 AST (SGOT) 12 (L) 15 - 37 U/L Alk. phosphatase 63 45 - 117 U/L Protein, total 7.8 6.4 - 8.2 g/dL Albumin 3.8 3.5 - 5.0 g/dL Globulin 4.0 2.0 - 4.0 g/dL A-G Ratio 1.0 (L) 1.1 - 2.2 LACTIC ACID Collection Time: 02/14/19 11:25 AM  
Result Value Ref Range Lactic acid 0.9 0.4 - 2.0 MMOL/L  
URINALYSIS W/ RFLX MICROSCOPIC Collection Time: 02/14/19  1:00 PM  
Result Value Ref Range Color YELLOW/STRAW Appearance CLOUDY (A) CLEAR Specific gravity 1.022 1.003 - 1.030    
 pH (UA) 7.5 5.0 - 8.0 Protein TRACE (A) NEG mg/dL Glucose NEGATIVE  NEG mg/dL Ketone TRACE (A) NEG mg/dL Bilirubin NEGATIVE  NEG Blood LARGE (A) NEG Urobilinogen 1.0 0.2 - 1.0 EU/dL Nitrites NEGATIVE  NEG Leukocyte Esterase TRACE (A) NEG Radiologic Studies -  
US TRANSVAGINAL Final Result IMPRESSION: 1.8 cm complex nodule right adnexa without significant blood flow. This could represent the known ectopic pregnancy. Mild complex free fluid  
compatible with hemoperitoneum. Medical Decision Making I am the first provider for this patient. I reviewed the vital signs, available nursing notes, past medical history, past surgical history, family history and social history. Vital Signs-Reviewed the patient's vital signs. Patient Vitals for the past 12 hrs: 
 Temp Pulse Resp BP SpO2  
02/14/19 1300    (!) 86/51   
02/14/19 1100    (!) 85/59 99 % 02/14/19 1051     99 % 02/14/19 1042    92/52   
02/14/19 0958 98.2 °F (36.8 °C) 70 11 96/64 100 % Pulse Oximetry Analysis - 100% on RA Cardiac Monitor: Rate: 70 bpm 
Rhythm: Normal Sinus Rhythm Records Reviewed: Nursing Notes, Old Medical Records, Previous Radiology Studies and Previous Laboratory Studies Provider Notes (Medical Decision Making): Consider methotrexate related abdominal pain versus ruptured ectopic pregnancy, will check labs, US, and possible OB consultation. US concerning for hemoperitoneum; Hgb stable, thin body habitus but does show soft BP. IVF's ordered, will go for laparoscopy w/ Dr. Oliverio Melgar. No need for blood or vasopressors at this point. ED Course:  
Initial assessment performed. The patients presenting problems have been discussed, and they are in agreement with the care plan formulated and outlined with them. I have encouraged them to ask questions as they arise throughout their visit. Medications  
0.9% sodium chloride infusion 1,000 mL (1,000 mL IntraVENous New Bag 2/14/19 1121) CONSULT NOTE:  
12:54 PM 
Sweta Winters. Dimas Galvan MD spoke with Scotty Giron MD  
Specialty: OB/GYN Discussed pt's hx, disposition, and available diagnostic and imaging results. Reviewed care plans. Consultant agrees with plans as outlined. Dr. Oliverio Melgar will come evaluate pt. Written by Emilee Alexis ED Scribe, as dictated by Sweta Winters. Dimas Galvan MD. 
 
PROGRESS NOTE: 
1:39 PM 
Dr. Oliverio Melgar has seen and evaluated pt. She will take her to the OR given US findings. Written by LANG Pappasibe, as dictated by Sweta Winters. Dimas Galvan MD. 
 
CRITICAL CARE NOTE : 
 
1:40 PM 
 
IMPENDING DETERIORATION -Metabolic ASSOCIATED RISK FACTORS - Hypotension and Bleeding (ectopic) MANAGEMENT- Bedside Assessment and Supervision of Care INTERPRETATION -  Blood Pressure and Screening Ultrasound INTERVENTIONS - hemodynamic mngmt CASE REVIEW - Medical Sub-Specialist, Nursing and Family TREATMENT RESPONSE -Unchanged PERFORMED BY - Self NOTES   : 
 
I have spent 35 minutes of critical care time involved in lab review, consultations with specialist, family decision- making, bedside attention and documentation. During this entire length of time I was immediately available to the patient . Anastasiia Kearney MD 
 
Disposition: 
ADMIT NOTE: 
1:39 PM 
The patient is being admitted to the hospital by Yaritza Gtz MD.  The results of their tests and reasons for their admission have been discussed with the patient and/or available family. They convey agreement and understanding for the need to be admitted and for their admission diagnosis. PLAN: 
1. Admit to OB/GYN Diagnosis Clinical Impression: 1. Right ovarian pregnancy without intrauterine pregnancy Attestations: This note is prepared by Diamond Camargo, acting as Scribe for Anastasiia Kearney MD. Anastasiia Cosby. Jemima Kearney MD: The scribe's documentation has been prepared under my direction and personally reviewed by me in its entirety. I confirm that the note above accurately reflects all work, treatment, procedures, and medical decision making performed by me.

## 2019-02-14 NOTE — PERIOP NOTES
TRANSFER - IN REPORT: 
 
Verbal report received from Ana Tanner RN(name) on [address]  being received from ED Room 29(unit) for ordered procedure Report consisted of patients Situation, Background, Assessment and  
Recommendations(SBAR). Information from the following report(s) SBAR, Kardex, Intake/Output, MAR, Recent Results and Procedure Verification was reviewed with the receiving nurse. Opportunity for questions and clarification was provided. Assessment completed upon patients arrival to unit and care assumed.

## 2019-02-14 NOTE — ANESTHESIA POSTPROCEDURE EVALUATION
Procedure(s): DIAGNOSTIC LAPAROSCOPY WITH RIGHT SALPINGECTOMY AND REMOVAL OF RIGHT ECTOPIC PREGNANCY. Anesthesia Post Evaluation Patient location during evaluation: PACU Note status: Adequate. Level of consciousness: responsive to verbal stimuli and sleepy but conscious Pain management: satisfactory to patient Airway patency: patent Anesthetic complications: no 
Cardiovascular status: acceptable Respiratory status: acceptable Hydration status: acceptable Comments: +Post-Anesthesia Evaluation and Assessment Patient: Noah Maciel MRN: [id number]  SSN: [id number] YOB: 1985  Age: 36 y.o. Sex: female Cardiovascular Function/Vital Signs BP 95/58 (BP 1 Location: Left arm, BP Patient Position: At rest)   Pulse (!) 58   Temp 36.9 °C (98.4 °F)   Resp 14   Ht 5' 4\" (1.626 m)   Wt 57.3 kg (126 lb 5.2 oz)   SpO2 97%   BMI 21.68 kg/m² Patient is status post Procedure(s): DIAGNOSTIC LAPAROSCOPY WITH RIGHT SALPINGECTOMY AND REMOVAL OF RIGHT ECTOPIC PREGNANCY. Nausea/Vomiting: Controlled. Postoperative hydration reviewed and adequate. Pain: 
Pain Scale 1: Numeric (0 - 10) (02/14/19 1715) Pain Intensity 1: 4 (02/14/19 1715) Managed. Neurological Status:  
Neuro (WDL): Within Defined Limits (02/14/19 1621) At baseline. Mental Status and Level of Consciousness: Arousable. Pulmonary Status:  
O2 Device: Room air (02/14/19 1715) Adequate oxygenation and airway patent. Complications related to anesthesia: None Post-anesthesia assessment completed. No concerns. Signed By: Gorge Cardona MD  
 2/14/2019 Post anesthesia nausea and vomiting:  controlled Visit Vitals BP 95/58 (BP 1 Location: Left arm, BP Patient Position: At rest) Pulse (!) 58 Temp 36.9 °C (98.4 °F) Resp 14 Ht 5' 4\" (1.626 m) Wt 57.3 kg (126 lb 5.2 oz) SpO2 97% BMI 21.68 kg/m²

## 2019-02-14 NOTE — PERIOP NOTES
Handoff Report from Operating Room to PACU Report received from Joann Henry Dr.  and Dorothy Hawkins CRNA regarding Leopold Peel. Surgeon(s): 
MD Sam Rizzo MD  And Procedure(s) (LRB): DIAGNOSTIC LAPAROSCOPY WITH RIGHT SALPINGECTOMY AND REMOVAL OF RIGHT ECTOPIC PREGNANCY (N/A)  confirmed  
with allergies and dressings discussed. Anesthesia type, drugs, patient history, complications, estimated blood loss, vital signs, intake and output, and last pain medication and reversal medications were reviewed.

## 2019-02-14 NOTE — H&P
Gynecology History and Physical 
 
Name: Jt Pineda MRN: [id number] [id number]: [id number] YOB: 1985  Age: 36 y.o. Sex: female Subjective: Chief complaint:  Ectopic Pregnancy Tori Kovacs is a 36 y.o.   who was seen by Dr Perri Pretty for likely ectopic . She had abnormally rising BhCG levels and US showing no IUP and free fluid but without visualized ectopic. She opted for treatment with Methotrexate as she was asymptomatic at the time and go that 2 days ago. She woke up this morning with severe abdominal pain and presented to the ED. She denies feeling lightheaded or dizzy. She has stable vital signs and normal hemoglobin of 11.7 (12.3 in our office 2 days ago) Ultrasound today showed 1.8 cm complex nodule in right adnexa consistent with likely ectopic and mild complex free fluid in pelvis consistent with hemoperitoneum. OB History  Para Term  AB Living 1 1 1     1 SAB TAB Ectopic Molar Multiple Live Births 1  
  
 
Past Medical History:  
Diagnosis Date  Asthma  Asthma  Gastrointestinal disorder   
 reflux  Multiple sclerosis ([address] Past Surgical History:  
Procedure Laterality Date  HX GYN  10/2013  
 c section  HX HEENT    
 tonsils, adenoids, PE tubes Social History Occupational History  Not on file Tobacco Use  Smoking status: Never Smoker  Smokeless tobacco: Never Used Substance and Sexual Activity  Alcohol use: Yes Frequency: Never Comment: Rare  Drug use: No  
 Sexual activity: No  
 
Family History Problem Relation Age of Onset  Heart Disease Mother  Hypertension Mother  Diabetes Mother  Seizures Maternal Grandmother Allergies Allergen Reactions  Sulfa (Sulfonamide Antibiotics) Hives Review of Systems: A comprehensive review of systems was negative except for that written in the History of Present Illness. Objective:  
 
Vitals: 02/14/19 1042 02/14/19 1051 02/14/19 1100 02/14/19 1300 BP: 92/52  (!) 85/59 (!) 86/51 Pulse:      
Resp:      
Temp:      
SpO2:  99% 99% Weight:      
Height:      
 
 
Physical Exam: 
Patient without distress. Heart: Regular rate and rhythm Lung: clear to auscultation throughout lung fields, no wheezes, no rales, no rhonchi and normal respiratory effort Abdomen: soft, tender to palpation without rebound or guarding Ext- symmetric Assessment:  
 
Active Problems: * No active hospital problems. * Ectopic Pregnancy treated with methotrexate 2 days ago and now with pelvic pain and ultrasound concerning for rupture of ectopic. We reviewed options of surgery with removal of ectopic vs expectant management in the hospital with serial hemoglobins. She would like to proceed with surgery. Plan diagnostic laparoscopy with removal of ectopic and possible removal of one or both fallopian tubes and possible need for laparotomy. Discussed possible effects on future fertiltiy and possible need for IVF. Plan:  
 
Procedure(s) (LRB): 
LAPAROSCOPY GYN DIAGNOSTIC (N/A) Discussed the risks of surgery including the risks of bleeding, infection, deep vein thrombosis, and surgical injuries to internal organs including but not limited to the bowels, bladder, rectum, and female reproductive organs. The patient understands the risks; any and all questions were answered to the patient's satisfaction. Signed By:  Morris aSlas MD   
 February 14, 2019

## 2019-02-14 NOTE — DISCHARGE INSTRUCTIONS
After Care Instructions For Your Laparoscopy      1. You may resume your usual diet once the nausea resolves. Initially, try sips of warm fluids and a bland diet. 2. Avoid heavy lifting or straining. Gradually increase your activity. First try walking and doing light activity around the house. You may resume your normal habits if no significant discomfort or bleeding develops. Most women can return to work within 2-7 days after this procedure. 3. Sexual intercourse can be resumed as soon as desired provided the discomfort following surgery has subsided. 4. You may take showers or tub baths. It is also safe to swim or use hot tubs once you feel up to it. 5. Some lower abdominal cramping typically occurs after this procedure. Tylenol, Motrin or your prescribed pain medication should relieve this discomfort. You should notice steady improvement in the pain over the next day or so. It is also not unusual to experience some discomfort under your ribs or to notice neck or shoulder soreness. This is due to gas used during the surgery to help the physicians see your pelvic organs. The gas is reabsorbed over a 24 hour course. 6. It is not unusual to have vaginal spotting lasting 2-3 days. It is difficult to predict when your next menstrual period will occur as your body has undergone a major stress. Your period should regulate itself within the first 6 weeks post-op. 7. A bruise may appear around the incision and will gradually resolve as it heals. 8. The suture used to close the incision may be visible above the skin. If so, it will be removed at your office visit. However, frequently sutures are placed below the skin and will reabsorb on their own.   There will be no need for removal.     9. Call the office at [phone] to report any of the following problems: Abdominal pain that is increasing in severity, heavy vaginal bleeding, drainage or separation of your incision site, temperature greater than 100.4 or persistent nausea and vomiting. 10. You should be seen in the office following your surgery. Call for an appointment if this has not already been arranged. At this appointment, the findings noted at the time of your procedure will be discussed. 11. You may remove the dressing from your incision after 12 hours. A common side effect of anesthesia following surgery is nausea and/or vomiting. In order to decrease symptoms, it is wise to avoid foods that are high in fat, greasy foods, milk products, and spicy foods for the first 24 hours. Acceptable foods for the first 24 hours following surgery include but are not limited to:     soup   broth    toast    crackers    applesauce    bananas    mashed potatoes,   soft or scrambled eggs   oatmeal    jello    It is important to eat when taking your pain medication. This will help to prevent nausea. If possible, please try to time your meals with your medications. It is very important to stay hydrated following surgery. Sip fluids frequently while awake. Avoid acidic drinks such as citrus juices and soda for 24 hours. Carbonated beverages may cause bloating and gas. Acceptable fluids include:    - water (flavor packets may add variety)  - coffee or tea (in moderation)  - Gatorade  - Nicolas-aid  - apple juice  - cranberry juice    You are encouraged to cough and deep breathe every hour when awake. This will help to prevent respiratory complications following anesthesia. You may want to hug a pillow when coughing and sneezing to add additional support to the surgical area and to decrease discomfort if you had abdominal or chest surgery. If you are discharged home with support stockings, you may remove them after 24 hours. Support stockings are used to help prevent blood clots in the legs following surgery.     Please take time to review all of your Home Care Instructions and Medication Information sheets provided in your discharge packet. If you have any questions, please contact your surgeons office. Thank you. How to Care for Your Wound After Its Treated With  DERMABOND* Topical Skin Adhesive  DERMABOND* Topical Skin Adhesive (2-octyl cyanoacrylate) is a sterile, liquid skin adhesive  that holds wound edges together. The film will usually remain in place for 5 to 10 days, then  naturally fall off your skin. The following will answer some of your questions and provide instructions for proper care for your  wound while it is healing:    CHECK WOUND APPEARANCE   Some swelling, redness, and pain are common with all wounds and normally will go away as the  wound heals. If swelling, redness, or pain increases or if the wound feels warm to the touch,  contact a doctor. Also contact a doctor if the wound edges reopen or separate. REPLACE BANDAGES   If your wound is bandaged, keep the bandage dry.  Replace the dressing daily until the adhesive film has fallen off or if the  bandage should become wet, unless otherwise instructed by your  physician.  When changing the dressing, do not place tape directly over the  DERMABOND adhesive film, because removing the tape later may also  remove the film. AVOID TOPICAL MEDICATIONS   Do not apply liquid or ointment medications or any other product to your wound while the  DERMABOND adhesive film is in place. These may loosen the film before your wound is healed. KEEP WOUND DRY AND PROTECTED   You may occasionally and briefly wet your wound in the shower or bath. Do not soak or scrub  your wound, do not swim, and avoid periods of heavy perspiration until the DERMABOND  adhesive has naturally fallen off. After showering or bathing, gently blot your wound dry with a  soft towel. If a protective dressing is being used, apply a fresh, dry bandage, being sure to keep  the tape off the DERMABOND adhesive film.    Apply a clean, dry bandage over the wound if necessary to protect it.  Protect your wound from injury until the skin has had sufficient time to heal.   Do not scratch, rub, or pick at the DERMABOND adhesive film. This may loosen the film before  your wound is healed.  Protect the wound from prolonged exposure to sunlight or tanning lamps while the film is in  place. If you have any questions or concerns about this product, please consult your doctor. *Trademark ©[employer] 2002    Patient Education      Narcotic-Analgesic/Acetaminophen (Percocet, [address] [facility], [facility], Lortab 10/325) - (By mouth)   Why this medicine is used:   Relieves pain. Contact a nurse or doctor right away if you have:  · Extreme weakness, shallow breathing, slow heartbeat  · Severe confusion, lightheadedness, dizziness, fainting  · Yellow skin or eyes, dark urine or pale stools  · Severe constipation, severe stomach pain, nausea, vomiting, loss of appetite  · Sweating or cold, clammy skin     Common side effects:  · Mild constipation, nausea, vomiting  · Sleepiness, tiredness  · Itching, rash  © 2017 [facility] Information is for End User's use only and may not be sold, redistributed or otherwise used for commercial purposes. Patient Education   Ibuprofen (By mouth)   Ibuprofen (eye-bue-PROE-fen)  Treats pain and fever. This medicine is an NSAID. Brand Name(s): Advil, Advil Children's, Advil Liqui-Gels, Advil Migraine, All-Purpose First Aid Kit, Children's Ibuprofen, Children's Motrin, Comfort Pac, Concentrated Motrin Infants' Drops, Equate Ibuprofen Immanuel Strength, Genpril, [facility]ighbor Ibuprofen Infants', [facility] Children's Ibuprofen, [facility] Ibuprofen, [facility] Ibuprofen Immanuel Strength   There may be other brand names for this medicine. When This Medicine Should Not Be Used: This medicine is not right for everyone.  Do not use if you had an allergic reaction (including asthma) to ibuprofen, aspirin, or another NSAID, or right before or after heart surgery. How to Use This Medicine:   Capsule, Liquid Filled Capsule, Suspension, Tablet, Chewable Tablet  · Your doctor will tell you how much medicine to use. Do not use more than directed. · Prescription ibuprofen should come with a Medication Guide. Ask your pharmacist for the Medication Guide if you do not have one. · Follow the instructions on the medicine label if you are using this medicine without a prescription. · Take this medicine with food or milk if it upsets your stomach. · Oral liquid: Shake well just before using. Measure with a marked measuring spoon, oral syringe, or medicine cup. · Chewable tablet: Chew completely before you swallow it. Then drink some water to make sure you swallow all of the medicine. · For Children: Ask your pharmacist if you are not sure how much medicine to give a child. The dose is usually based on weight, not age. Never give more medicine than directed. · For Adults: Do not take more than 6 pills in 1 day (24 hours) unless your doctor tells you to. · Missed dose: If you take this medicine on a regular basis and miss a dose, take it as soon as you can. If it is almost time for your next dose, wait until then to use the medicine and skip the missed dose. Do not use extra medicine to make up for a missed dose. · Store the medicine in a closed container at room temperature, away from heat, moisture, and direct light. Do not freeze the oral liquid. Drugs and Foods to Avoid:   Ask your doctor or pharmacist before using any other medicine, including over-the-counter medicines, vitamins, and herbal products. · Some foods and medicine can affect how ibuprofen works. Tell your doctor if you are also using lithium, methotrexate, a blood thinner (such as warfarin), a steroid medicine (such as hydrocortisone, prednisolone, prednisone), a diuretic (water pill), or an ACE inhibitor blood pressure medicine.   · Do not use any other NSAID medicine unless your doctor says it is okay. Some other NSAIDs are aspirin, diclofenac, naproxen, or celecoxib. · Do not drink alcohol while you are using this medicine. Warnings While Using This Medicine:   · Tell your doctor if you are pregnant or breastfeeding. Do not use this medicine during the later part of pregnancy. · Tell your doctor if you have kidney disease, liver disease, asthma, lupus or a similar connective tissue disease, or a history of ulcers or other digestion problems. Tell your doctor if you smoke or have heart or blood circulation problems, including high blood pressure, heart failure (CHF), or bleeding problems. · This medicine may cause the following problems:  ¨ Bleeding and ulcers in the stomach or intestines  ¨ Higher risk of heart attack or stroke  ¨ Liver damage  ¨ Kidney damage  ¨ Vision problems  · Call your doctor if symptoms get worse, pain lasts more than 10 days, or fever lasts more than 3 days. · This medicine might contain sugar or phenylalanine (aspartame). · Tell any doctor or dentist who treats you that you are using this medicine. · Keep all medicine out of the reach of children. Never share your medicine with anyone.   Possible Side Effects While Using This Medicine:   Call your doctor right away if you notice any of these side effects:  · Allergic reaction: Itching or hives, swelling in your face or hands, swelling or tingling in your mouth or throat, chest tightness, trouble breathing  · Blistering, peeling, or red skin rash  · Change in how much or how often you urinate  · Chest pain that may spread to your arms, jaw, back, or neck, trouble breathing, nausea, unusual sweating, faintness  · Chest pain, trouble breathing, weakness on one side of your body, severe headache, trouble seeing or talking, pain in your lower leg  · Dark urine or pale stools, nausea, vomiting, loss of appetite, stomach pain, yellow skin or eyes  · Fever, neck pain, stiff neck  · Severe stomach pain, vomiting blood, bloody or black, tarry stools  · Swelling in your hands, ankles, or feet, rapid weight gain  · Trouble seeing, blind spots, change in how you see colors  · Unusual bleeding, bruising, or weakness  If you notice these less serious side effects, talk with your doctor:   · Constipation, diarrhea, gas, mild upset stomach  · Dizziness, headache, ringing in the ears  If you notice other side effects that you think are caused by this medicine, tell your doctor. Call your doctor for medical advice about side effects. You may report side effects to FDA at 1-551-IUW-9307  © 2017 [facility] Information is for End User's use only and may not be sold, redistributed or otherwise used for commercial purposes. The above information is an  only. It is not intended as medical advice for individual conditions or treatments. Talk to your doctor, nurse or pharmacist before following any medical regimen to see if it is safe and effective for you.

## 2019-02-14 NOTE — ED NOTES
Pt sts has had \"bad couple of weeks\". Had taken methotrexate Tuesday to eradicate pregnancy due to ectopic pregnancy. Woke up this am and abdominal pain had increased and has now become worse with movement. Denies vaginal bleeding.

## 2019-02-14 NOTE — OP NOTES
Gynecologic Laparoscopy Procedure Note    Patient ID:     Name: [address] Record Number: [id number]    YOB: 1985    Preoperative Diagnosis:   Ruptured right ectopic pregnancy    Postoperative Diagnosis: Same    Surgeon: Dominga Rivera MD    Assistant: Michelle Gonzales MD    Anesthesia: General    Findings: Approximately 400 cc of blood clot in the pelvis and there was an ectopic pregnancy that has ruptured through the ampullary portion of the right fallopian tube. The left fallopian tube appeared normal and both ovaries were normal bilaterally. The abdominal and pelvic survey were otherwise normal.    Procedure: 1. Diagnostic laparoscopy 2. Right salpingectomy and removal of ectopic pregnancy      Procedure in Detail:  The patient was taken to the operating room where she was placed in the supine position. After undergoing adequate general endotracheal anesthesia, she was placed up in the [facility] laparoscopy stirrups in the dorsal lithotomy position. Both arms were tucked to the side. The patient was then prepped and draped in the usual fashion and Goyal catheter placed. A sponge stick was placed in the vagina and 's gloves were changed. Attention was then turned to the abdomen. A 10 mm infraumbilical incision was made with the scalpel and carried down to the underlying layer of fascia using Nika clamp. The S retractors were placed and the fascia visualized and grasp with Kocher clamps. The fascia was incised with Metzenbaum scissors and the fascial edges tagged with 0 Vicryl. The S retractors were replaced. The peritoneum was visualized and grasped with Layne Nett clamps and the peritoneum entered with Metzenbaum scissors. The Rosendo trocar was placed and CO2 insufflation begun. Intraabdominal placed was confirmed with camera and after careful inspection there was no evidence of injury upon entry into the abdomen.  Two 5 mm lateral ports were placed bilaterally under direct visualization. There was approximately 400 cc of blood clot in the pelvis and there was an ectopic pregnancy that has ruptured through the ampullary portion of the right fallopian tube. The Harmonic scalpel was used to come across the mesosalpinx and the tube and ectopic pregnancy were  from the uterus and ovary. A 5 mm camera was placed through the right lateral port and the Endocatch bag placed through the infraumbilical port. The tube and ectopic were placed in the bag and removed through the 10 mm port. The 10 mm camera was replaced. Copious irrigation was performed and there was no bleeding seen. The left fallopian tube appeared normal and both ovaries were normal bilaterally. The abdominal and pelvic survey were otherwise normal.  All instruments were removed and CO2 gas was diffused. There was good hemostasis Umbilical fascia was closed with 0 vicryl. The infraumbilical skin incision was closed with 4.0 Monocryl in a subcuticular stitch and Dermabond on the skin of all trocar sites. All instruments were removed from the vagina and there was no bleeding noted. The patient was awakened, extubated and taken to the recovery room in good condition. Sponge, lap and needle counts were correct x 2.     Estimated Blood Loss: 400 cc of blood clot in the pelvis, otherwise minimal bleeding    Pathology /Specimens: right fallopian tube and ectopic pregnancy    Complications: none      Signed By: Dave Weiss MD

## 2019-02-15 NOTE — PERIOP NOTES
Went over DC instructions with pt and her .  signed paper copy of instructions d/t sign pads not working. Copy of signed instructions placed on chart. Pt taken to car via wheelchair.

## 2019-03-05 ENCOUNTER — TELEPHONE (OUTPATIENT)
Dept: NEUROLOGY | Age: 34
End: 2019-03-05

## 2019-03-05 NOTE — TELEPHONE ENCOUNTER
----- Message from Laron Runner sent at 3/4/2019  4:03 PM EST -----  Regarding: Dr. Rod Segovia  Patient would like a call back from [facility].  Contact is [phone]

## 2019-04-17 DIAGNOSIS — G37.9 DEMYELINATING DISEASE OF CENTRAL NERVOUS SYSTEM (HCC): ICD-10-CM

## 2019-04-17 DIAGNOSIS — G35 EXACERBATION OF MULTIPLE SCLEROSIS (HCC): ICD-10-CM

## 2019-04-17 DIAGNOSIS — G35 MULTIPLE SCLEROSIS, RELAPSING-REMITTING (HCC): ICD-10-CM

## 2019-04-17 DIAGNOSIS — G35 MULTIPLE SCLEROSIS EXACERBATION (HCC): ICD-10-CM

## 2019-04-17 RX ORDER — DIMETHYL FUMARATE 240 MG/1
CAPSULE ORAL
Qty: 60 CAP | Refills: 6 | Status: SHIPPED | OUTPATIENT
Start: 2019-04-17 | End: 2019-10-07 | Stop reason: SDUPTHER

## 2019-09-17 ENCOUNTER — TELEPHONE (OUTPATIENT)
Dept: NEUROLOGY | Age: 34
End: 2019-09-17
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
## 2019-09-19 ENCOUNTER — TELEPHONE (OUTPATIENT)
Dept: NEUROLOGY | Age: 34
End: 2019-09-19
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
## 2019-09-25 ENCOUNTER — TELEPHONE (OUTPATIENT)
Dept: NEUROLOGY | Age: 34
End: 2019-09-25

## 2019-10-07 ENCOUNTER — OFFICE VISIT (OUTPATIENT)
Dept: NEUROLOGY | Age: 34
End: 2019-10-07

## 2019-10-07 VITALS
OXYGEN SATURATION: 98 % | BODY MASS INDEX: 24.75 KG/M2 | HEART RATE: 84 BPM | RESPIRATION RATE: 12 BRPM | HEIGHT: 64 IN | DIASTOLIC BLOOD PRESSURE: 60 MMHG | WEIGHT: 145 LBS | SYSTOLIC BLOOD PRESSURE: 98 MMHG

## 2019-10-07 DIAGNOSIS — G35 MULTIPLE SCLEROSIS, RELAPSING-REMITTING (HCC): ICD-10-CM

## 2019-10-07 DIAGNOSIS — G37.9 DEMYELINATING DISEASE OF CENTRAL NERVOUS SYSTEM (HCC): ICD-10-CM

## 2019-10-07 DIAGNOSIS — G35 MULTIPLE SCLEROSIS EXACERBATION (HCC): ICD-10-CM

## 2019-10-07 DIAGNOSIS — G35 EXACERBATION OF MULTIPLE SCLEROSIS (HCC): ICD-10-CM

## 2019-10-07 RX ORDER — DIMETHYL FUMARATE 240 MG/1
CAPSULE ORAL
Qty: 60 CAP | Refills: 11 | Status: SHIPPED | OUTPATIENT
Start: 2019-10-07 | End: 2021-02-16

## 2019-10-08 NOTE — PROGRESS NOTES
Neurology Progress Note    Patient ID:  Anthony Acevedo  [id number]  72 y.o.  1985      CHIEF COMPLAINT: Blurred vision in the left eye    Subjective:      Patient is a 36-year-old right-handed female seen today at the request of Dr. Kaylah Alegre for evaluation of new problem to see how her new medication Tecfidera is controlling her multiple sclerosis. Patient has been seen in over a year, but claims that she is had no new neurologic symptoms, no unusual headache, no unusual weakness, numbness, gait problems, vision problems, or any new exacerbations of her MS. Patient has tolerated the medication well without side effect, has no GI side effects, no diarrhea, no flushing, and no unusual infections or other problems. We explained the patient may need repeat her MRI scan and she agrees to an MRI scan of the brain was ordered. We also refilled her medication for her in addition. Her metabolic parameters and hematologic parameters were also checked to make sure that she is safe with the medication that have marked leukopenia. Last visit she had abnormal MRI June 2017 scan suggesting for new lesions and recent visual loss in the left eye which suggests that she has multiple sclerosis disease uncontrolled by Copaxone and was switched to Tecfidera which she is tolerating well. She has had an episode of optic neuritis in the left eye, and was given IV steroids with some improvement, and her visual acuity is back to 2020 with handcart and near vision today. She is aware of the medication including efficacy and side effects and understands the flushing, GI side effects white count suppression and possible liver abnormalities with the medications and the need for testing as above. She understands the risk of PML with 5 cases in the monitoring needed which seems to help prevent as long because the white count drops below 0.5.   She is to get back on her medications including vitamin D and multivitamins and continue her muscle relaxers as needed. She is to discontinue the Novahist since her recent [facility] study showed does not work. She has a visual acuity of 20/20, but has a subjective blurring and slight decreased color desaturation in the left eye as compared to the right. Patient had an abnormal MRI scan of the brain and the cervical spine showing demyelinating lesions some of which were enhancing in both the brain and the cervical spine last year. The patient had a previous workup in 2011 with abnormal spinal fluid suggesting demyelinating disease with elevated IgG synthesis rate, oligoclonal bands and myelin basic protein, and an abnormal MRI of the brain and cervical spine then, but she never returned for follow-up. She also had a previous episode in 2005 when I saw her 12 years ago, at that time her workup was negative for any signs of MS. She is better, and able to walk without assistive devices, but her  does say she is at times clumsy and seems more forgetful. Her vitamin D level was low and she is to start a B complex vitamin and 2000 units of vitamin D every day. 35 minutes spent with the patient in the office today, going over all the different medications, giving her information to read on them, advising her of the risks, benefits and side effects of all the different medications.          Past Medical History:   Diagnosis Date    Asthma     Asthma     Gastrointestinal disorder     reflux    GERD (gastroesophageal reflux disease)     Multiple sclerosis (HCC)        Past Surgical History:   Procedure Laterality Date    HX GYN  10/2013    c section    HX HEENT      tonsils, adenoids, PE tubes       [unfilled]    Social History     Tobacco Use    Smoking status: Never Smoker    Smokeless tobacco: Never Used   Substance Use Topics    Alcohol use: Yes     Frequency: Never     Comment: Rare           Allergies   Allergen Reactions    Sulfa (Sulfonamide Antibiotics) Hives       Review of Systems:    A comprehensive review of systems was negative except for: Constitutional: positive for fatigue and malaise  Eyes: positive for visual disturbance  Neurological: positive for coordination problems, gait problems, weakness and Visual loss    Objective:      Objective:     @IPVITALS(24:)@      Lab Review No results found for this or any previous visit (from the past 24 hour(s)). Additional comments:I personally viewed and interpreted the patient's MRI scans reviewed personally on the PACS system today, and they were shown to the patient        NEUROLOGICAL EXAM:    Appearance: The patient is well developed, well nourished, provides a coherent history and is in no acute distress. Mental Status: Oriented to time, place and person and the president, cognitive function and fund of knowledge is normal. Speech is fluent without aphasia or dysarthria. Mood and affect appropriate. Cranial Nerves:   Intact visual fields. Fundi are positive and they show optic pallor bilaterally left greater than right. No Shashank Layne pupil present. Visual acuity is 20/20 in the left eye missing only 1 number with near vision, and 20/20 in the right eye. CLEMENTE, EOM's full, no nystagmus, no ptosis. Facial sensation is normal. Corneal reflexes are not tested. Facial movement is symmetric. Hearing is normal bilaterally. Palate is midline with normal sternocleidomastoid and trapezius muscles are normal. Tongue is midline. Neck without meningismus or bruits  Temporal arteries are not tender or enlarged    Motor:  5/5 strength in upper and lower proximal and distal muscles. Normal bulk and tone. No fasciculations.   Patient has percussion tenderness over the lumbar spine, and tenderness over the paraspinal muscles which are tight and sore  Rapid alternating movement is a little slow but symmetric bilaterally   Reflexes:   Deep tendon reflexes 2+/4 and symmetrical in the upper extremities, and 3+/4 in the lower extremities bilaterally. No babinski or clonus present   Sensory:   Normal to touch, pinprick and temperature and vibration and temperature. DSS is intact   Gait:  Abnormal gait with patient moving slowly due to spastic mildly paraplegic gait with ataxia. Finger-nose-finger examination shows no major dysmetria   Tremor:   No tremor noted. Cerebellar:   Mildly abnormal Romberg and tandem cerebellar signs present. Neurovascular:  Normal heart sounds and regular rhythm, peripheral pulses intact, and no carotid bruits. Assessment:        Assessment:       ICD-10-CM ICD-9-CM    1. Multiple sclerosis exacerbation (HCC) G35 340 CBC WITH AUTOMATED DIFF      METABOLIC PANEL, COMPREHENSIVE      MRI BRAIN W WO CONT      TECFIDERA 240 mg cpDR   2. Multiple sclerosis, relapsing-remitting (HCC) G35 340 CBC WITH AUTOMATED DIFF      METABOLIC PANEL, COMPREHENSIVE      MRI BRAIN W WO CONT      TECFIDERA 240 mg cpDR   3. Exacerbation of multiple sclerosis (HCC) G35 340 CBC WITH AUTOMATED DIFF      METABOLIC PANEL, COMPREHENSIVE      MRI BRAIN W WO CONT      TECFIDERA 240 mg cpDR   4. 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 MRI of the brain ordered and lab test to monitor her hematologic and liver function test on the medication. Medication renewed for her again. Patient with no side effects except mild nausea with the Tecfidera, and she wants to continue the medications so we will continue them. White count will be checked again for absolute lymphocyte count, and she had a recent chemistry that showed normal liver function tests  She has had no relapses and no exacerbations on the medication so far.   We will repeat her MRI scan at one years time.  Blood work was done today and she will be monitored closely for that and be seen again in 3 months time to recheck her white count. Problem of lumbosacral and cervical strain, which she will take prescription strength Motrin and her Zanaflex as a muscle relaxant. She is to start a low back exercise program, and start her physical therapy as ordered. If she fails therapy and conservative management she may need an MRI scan of the lumbar spine. We discussed her condition with her and her family also today   Follow-up in 6 months time as arranged. Signed:  Devra Leyden, MD  10/7/2019  8:51 PM    Destiney Gonzalez MD  [phone]    This note will not be viewable in [address]

## 2019-10-11 ENCOUNTER — TELEPHONE (OUTPATIENT)
Dept: NEUROLOGY | Age: 34
End: 2019-10-11

## 2019-10-21 ENCOUNTER — HOSPITAL ENCOUNTER (OUTPATIENT)
Dept: MRI IMAGING | Age: 34
Discharge: HOME | End: 2019-10-21
Attending: PSYCHIATRY & NEUROLOGY
Payer: COMMERCIAL

## 2019-10-21 DIAGNOSIS — G35 MULTIPLE SCLEROSIS, RELAPSING-REMITTING (HCC): ICD-10-CM

## 2019-10-21 DIAGNOSIS — G37.9 DEMYELINATING DISEASE OF CENTRAL NERVOUS SYSTEM (HCC): ICD-10-CM

## 2019-10-21 DIAGNOSIS — G35 EXACERBATION OF MULTIPLE SCLEROSIS (HCC): ICD-10-CM

## 2019-10-21 DIAGNOSIS — G35 MULTIPLE SCLEROSIS EXACERBATION (HCC): ICD-10-CM

## 2019-10-21 PROCEDURE — 70553 MRI BRAIN STEM W/O & W/DYE: CPT

## 2019-10-21 PROCEDURE — A9575 INJ GADOTERATE MEGLUMI 0.1ML: HCPCS | Performed by: PSYCHIATRY & NEUROLOGY

## 2019-10-21 PROCEDURE — 74011250636 HC RX REV CODE- 250/636: Performed by: PSYCHIATRY & NEUROLOGY

## 2019-10-21 RX ORDER — GADOTERATE MEGLUMINE 376.9 MG/ML
15 INJECTION INTRAVENOUS
Status: COMPLETED | OUTPATIENT
Start: 2019-10-21 | End: 2019-10-21

## 2019-10-21 RX ADMIN — GADOTERATE MEGLUMINE 13 ML: 376.9 INJECTION INTRAVENOUS at 19:22

## 2019-10-24 ENCOUNTER — TELEPHONE (OUTPATIENT)
Dept: NEUROLOGY | Age: 34
End: 2019-10-24

## 2019-10-24 NOTE — TELEPHONE ENCOUNTER
Per Dr. Jeane Jones letter:    Mrs. Sweta Huffman, I tried to call you but your lines busy, your MRI scan looks stable, no new enhancing lesions, and that is good news, continue your medication and call me if you have any questions

## 2020-01-08 ENCOUNTER — TELEPHONE (OUTPATIENT)
Dept: NEUROLOGY | Age: 35
End: 2020-01-08

## 2020-01-08 NOTE — TELEPHONE ENCOUNTER
----- Message from Noemi Figueroa sent at 1/8/2020  9:23 AM EST -----  Regarding: Merced Seip- MD/Telephone  General Message/Vendor Calls    Caller's first and last name:  Duc Adler     Reason for call: Duc Adler from [facility]. is requesting to scheduled an appt with Dr. Pio Shore and speak with Shun Soria\".        Callback required yes/no and why: Yes       Best contact number(s): [phone]       Details to clarify the request: N/A

## 2020-01-08 NOTE — TELEPHONE ENCOUNTER
----- Message from General Plume sent at 1/8/2020  9:31 AM EST -----  Regarding: Darcy Solomon MD/Telephone  Caller's first and last name:   Lakhwinder  (Added Info)     Reason for call: Lakhwinder from [facility]. is requesting to scheduled an appt with Dr. Salvatore Thomas and speak with Sarah oSria\".  Pt's Account Number : [de-identified]      Callback required yes/no and why: Yes       Best contact number(s): [phone]       Details to clarify the request: N/A

## 2020-01-24 ENCOUNTER — TELEPHONE (OUTPATIENT)
Dept: NEUROLOGY | Age: 35
End: 2020-01-24

## 2020-01-24 NOTE — TELEPHONE ENCOUNTER
I called the patient on 1/23/2020 and asked for a call back from the patient to try to get permission to speak to the

## 2020-01-24 NOTE — TELEPHONE ENCOUNTER
----- Message from Elizabeth Keys sent at 1/24/2020 11:09 AM EST -----  Regarding: Ashley Raygoza MD/Telephone  Caller's first and last name:   Sosa Noriega)     Reason for call: Chitra Causey from [facility]. is making a 3rd request for a call back from Dr. Luz Marina Whyte to speak with Richi Figueroa" regarding the pt.  Pt's Account Number : [de-identified]       Callback required yes/no and why: Yes       Best contact number(s): [phone]       Details to clarify the request: N/A

## 2020-01-31 ENCOUNTER — DOCUMENTATION ONLY (OUTPATIENT)
Dept: NEUROLOGY | Age: 35
End: 2020-01-31
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
## 2020-03-17 ENCOUNTER — TELEPHONE (OUTPATIENT)
Dept: NEUROLOGY | Age: 35
End: 2020-03-17

## 2020-04-08 ENCOUNTER — VIRTUAL VISIT (OUTPATIENT)
Dept: NEUROLOGY | Age: 35
End: 2020-04-08

## 2020-04-08 DIAGNOSIS — G35 MULTIPLE SCLEROSIS EXACERBATION (HCC): Primary | ICD-10-CM

## 2020-04-08 DIAGNOSIS — G35 MULTIPLE SCLEROSIS, RELAPSING-REMITTING (HCC): ICD-10-CM

## 2020-04-08 DIAGNOSIS — H46.9 LEFT OPTIC NEURITIS: ICD-10-CM

## 2020-04-08 DIAGNOSIS — G37.9 DEMYELINATING DISEASE OF CENTRAL NERVOUS SYSTEM (HCC): ICD-10-CM

## 2020-04-08 DIAGNOSIS — H46.9 OPTIC NEUROPATHY, LEFT: ICD-10-CM

## 2020-04-08 DIAGNOSIS — G35 EXACERBATION OF MULTIPLE SCLEROSIS (HCC): ICD-10-CM

## 2020-04-08 RX ORDER — TIZANIDINE 2 MG/1
2 TABLET ORAL
Qty: 100 TAB | Refills: 11 | Status: SHIPPED | OUTPATIENT
Start: 2020-04-08 | End: 2021-09-01 | Stop reason: ALTCHOICE

## 2020-04-08 NOTE — PATIENT INSTRUCTIONS
A Healthy Lifestyle: Care Instructions Your Care Instructions A healthy lifestyle can help you feel good, stay at a healthy weight, and have plenty of energy for both work and play. A healthy lifestyle is something you can share with your whole family. A healthy lifestyle also can lower your risk for serious health problems, such as high blood pressure, heart disease, and diabetes. You can follow a few steps listed below to improve your health and the health of your family. Follow-up care is a key part of your treatment and safety. Be sure to make and go to all appointments, and call your doctor if you are having problems. It's also a good idea to know your test results and keep a list of the medicines you take. How can you care for yourself at home? · Do not eat too much sugar, fat, or fast foods. You can still have dessert and treats now and then. The goal is moderation. · Start small to improve your eating habits. Pay attention to portion sizes, drink less juice and soda pop, and eat more fruits and vegetables. ? Eat a healthy amount of food. A 3-ounce serving of meat, for example, is about the size of a deck of cards. Fill the rest of your plate with vegetables and whole grains. ? Limit the amount of soda and sports drinks you have every day. Drink more water when you are thirsty. ? Eat at least 5 servings of fruits and vegetables every day. It may seem like a lot, but it is not hard to reach this goal. A serving or helping is 1 piece of fruit, 1 cup of vegetables, or 2 cups of leafy, raw vegetables. Have an apple or some carrot sticks as an afternoon snack instead of a candy bar. Try to have fruits and/or vegetables at every meal. 
· Make exercise part of your daily routine. You may want to start with simple activities, such as walking, bicycling, or slow swimming. Try to be active 30 to 60 minutes every day.  You do not need to do all 30 to 60 minutes all at once. For example, you can exercise 3 times a day for 10 or 20 minutes. Moderate exercise is safe for most people, but it is always a good idea to talk to your doctor before starting an exercise program. 
· Keep moving. Kee Fickle the lawn, work in the garden, or [employer]. Take the stairs instead of the elevator at work. · If you smoke, quit. People who smoke have an increased risk for heart attack, stroke, cancer, and other lung illnesses. Quitting is hard, but there are ways to boost your chance of quitting tobacco for good. ? Use nicotine gum, patches, or lozenges. ? Ask your doctor about stop-smoking programs and medicines. ? Keep trying. In addition to reducing your risk of diseases in the future, you will notice some benefits soon after you stop using tobacco. If you have shortness of breath or asthma symptoms, they will likely get better within a few weeks after you quit. · Limit how much alcohol you drink. Moderate amounts of alcohol (up to 2 drinks a day for men, 1 drink a day for women) are okay. But drinking too much can lead to liver problems, high blood pressure, and other health problems. Family health If you have a family, there are many things you can do together to improve your health. · Eat meals together as a family as often as possible. · Eat healthy foods. This includes fruits, vegetables, lean meats and dairy, and whole grains. · Include your family in your fitness plan. Most people think of activities such as jogging or tennis as the way to fitness, but there are many ways you and your family can be more active. Anything that makes you breathe hard and gets your heart pumping is exercise. Here are some tips: 
? Walk to do errands or to take your child to school or the bus. 
? Go for a family bike ride after dinner instead of watching TV. Where can you learn more? Go to http://elizabeth-chuck.info/ Enter G712 in the search box to learn more about \"A Healthy Lifestyle: Care Instructions. \" Current as of: May 28, 2019Content Version: 12.4 © 1055-4996 [employer]. Care instructions adapted under license by [employer] (which disclaims liability or warranty for this information). If you have questions about a medical condition or this instruction, always ask your healthcare professional. [place] [address] any warranty or liability for your use of this information. Office Policies 
 
o Phone calls/patient messages: Please allow up to 24 hours for someone in the office to contact you about your call or message. Be mindful your provider may be out of the office or your message may require further review. We encourage you to use [employer] for your messages as this is a faster, more efficient way to communicate with our office 
 
o Medication Refills: 
Prescription medications require up to 48 business hours to process. We encourage you to use [employer] for your refills. For controlled medications: Please allow up to 72 business hours to process. Certain medications may require you to  a written prescription at our office. NO narcotic/controlled medications will be prescribed after 4pm Monday through Friday or on weekends 
 
o Form/Paperwork Completion: We ask that you allow 7-14 business days. You may also download your forms to [employer] to have your doctor print off.

## 2020-04-08 NOTE — PROGRESS NOTES
Neurology Progress Note    Patient ID:  Ayesha Martinez  [id number]  30 y.o.  1985      CHIEF COMPLAINT: Blurred vision in the left eye    Subjective:      Patient is a 69-year-old right-handed female seen today at the request of Dr. Dianne Ledbetter for evaluation of new problem of patient having drowsiness when she takes her muscle relaxant of Zanaflex, and that makes her too drowsy to take during the daytime, and she occasionally has muscle spasms that she cannot control otherwise. We will discontinue the 4 mg Zanaflex and give her the 2 mg Aleve and advised her that she can take one half of that pill if that makes her drowsy in addition. It does work well for the muscle spasms. We did mention that her medication of Tecfidera is a immunomodulators and not an immunosuppressant and that her risk of the virus should not be much more than the average person on that medication. She does have MS that seems to be stable she does not seem to have had any major exacerbation relapses or new focal weakness or sensory loss since her last visit 6 months ago. She had an MRI scan done October 2019 that looks stable on her medication of Tecfidera with no new enhancing lesions and stable white matter disease. She did have moderate severe number of lesions overall. Her last blood work was done over a year ago so we made sure that she gets new blood work and lab slip sent into for patient today. She will wait until the pandemic has cleared a little bit. She has enough of her Tecfidera and MS medication and is tolerating the medication well without any GI side effects or any other side effects so far. She takes her vitamins and vitamin D on a regular basis, and stays mentally and physically active, and is trying to stay home as much as possible during this pandemic.   She had her last exacerbation when she had abnormal MRI June 2017 scan suggesting four new lesions and recent visual loss in the left eye which suggests that she has multiple sclerosis disease uncontrolled by Copaxone and was switched to Tecfidera which she is tolerating well. She has had an episode of optic neuritis in the left eye, and was given IV steroids with some improvement, and her visual acuity is back to 20/20 with handcard and near vision October 2019. She is aware of the medication including efficacy and side effects and understands the flushing, GI side effects white count suppression and possible liver abnormalities with the medications and the need for laboratory testing as above. She understands the risk of PML with 5 cases in the monitoring needed which seems to help prevent as long because the white count drops below 0.5. She is to get back on her medications including vitamin D and multivitamins and continue her muscle relaxers as needed. She is to discontinue the Novahist since her recent [facility] study showed does not work. She has a visual acuity of 20/20, but has a subjective blurring and slight decreased color desaturation in the left eye as compared to the right. Patient had an abnormal MRI scan of the brain and the cervical spine showing demyelinating lesions some of which were enhancing in both the brain and the cervical spine last year. The patient had a previous workup in 2011 with abnormal spinal fluid suggesting demyelinating disease with elevated IgG synthesis rate, oligoclonal bands and myelin basic protein, and an abnormal MRI of the brain and cervical spine then, but she never returned for follow-up. She also had a previous episode in 2005 when I saw her 12 years ago, at that time her workup was negative for any signs of MS. She is better, and able to walk without assistive devices, but her  does say she is at times clumsy and seems more forgetful. Her vitamin D level was low and she is to start a B complex vitamin and 2000 units of vitamin D every day.   35 minutes spent with the patient in the office today, going over all the different medications, giving her information to read on them, advising her of the risks, benefits and side effects of all the different medications. Past Medical History:   Diagnosis Date    Asthma     Asthma     Gastrointestinal disorder     reflux    GERD (gastroesophageal reflux disease)     Multiple sclerosis (HCC)        Past Surgical History:   Procedure Laterality Date    HX GYN  10/2013    c section    HX HEENT      tonsils, adenoids, PE tubes       [unfilled]    Social History     Tobacco Use    Smoking status: Never Smoker    Smokeless tobacco: Never Used   Substance Use Topics    Alcohol use: Yes     Frequency: Never     Comment: Rare           Allergies   Allergen Reactions    Sulfa (Sulfonamide Antibiotics) Hives       Review of Systems:    A comprehensive review of systems was negative except for: Constitutional: positive for fatigue and malaise  Eyes: positive for visual disturbance  Neurological: positive for coordination problems, gait problems, weakness and Visual loss    Objective:      Objective:     @IPVITALS(24:)@      Lab Review No results found for this or any previous visit (from the past 24 hour(s)). Additional comments:I personally viewed and interpreted the patient's MRI scans reviewed personally on the PACS system today, and they were shown to the patient        NEUROLOGICAL EXAM:    Appearance: The patient is well developed, well nourished, provides a coherent history and is in no acute distress. Mental Status: Oriented to time, place and person and the president, cognitive function and fund of knowledge is normal. Speech is fluent without aphasia or dysarthria. Mood and affect appropriate. Cranial Nerves:   Intact visual fields. Fundi are not testable. No Shashank Layne pupil present. Visual acuity is 20/20 in the left eye missing only 1 number with near vision, and 20/20 in the right eye. CLEMENTE, EOM's full, no nystagmus, no ptosis.  Facial sensation is normal to touch. Corneal reflexes are not tested. Facial movement is symmetric. Hearing is normal bilaterally. Palate is midline with normal sternocleidomastoid and trapezius muscles are normal. Tongue is midline. Neck without meningismus   Temporal arteries are not tender or enlarged    Motor:  5/5 strength in upper and lower proximal and distal muscles. Normal bulk and tone. No fasciculations. Patient has percussion tenderness over the lumbar spine, and tenderness over the paraspinal muscles which are tight and sore  Rapid alternating movement is a little slow but symmetric bilaterally   Reflexes:   Deep tendon reflexes and Babinski sign and clonus are not testable  No babinski or clonus present   Sensory:   Normal to touch, and pinprick and temperature and vibration and DSS are not testable   Gait:  Abnormal gait with patient moving slowly due to spastic mildly paraplegic gait with ataxia. Finger-nose-finger examination shows no major dysmetria   Tremor:   No tremor noted. Cerebellar:   Mildly abnormal Romberg and tandem cerebellar signs present. Finger-nose-finger examination is unremarkable   Neurovascular:   Cardiac examination and carotid artery examination and peripheral artery examination are not testable         Assessment:        Assessment:       ICD-10-CM ICD-9-CM    1. Multiple sclerosis exacerbation ([facility]) G35 340    2. Demyelinating disease of central nervous system ([address] G37.9 341.9    3. Multiple sclerosis, relapsing-remitting ([facility]) G35 340    4. Optic neuropathy, left - recurrent H46.9 377.39    5. Exacerbation of multiple sclerosis ([address] G35 340    6. Left optic neuritis H46.9 377.30          Plan:     Patient with new problem of oversedation on her muscle relaxant, which she needs for muscle spasms, and we will give her a new prescription for the Zanaflex 2 mg, and she is to take one half or 1 whole tablet 3 times a day as needed for muscle spasms.   We reviewed her MRI scan with the patient from October 2019, and it did look stable without new enhancing lesions, but she still has moderate severe difficulty with white matter lesions in her brain that are stable. Patient seems to be doing okay on Tecfidera, and is tolerating the medication well without side effects so far  We ordered lab test to monitor her hematologic and liver function test on the medication. Medication renewed for her again. White count will be checked again for absolute lymphocyte count, and she needs chemistry for liver function also  She has had no relapses and no exacerbations on the medication so far. We will repeat her MRI scan at one years time. Blood work was done today and she will be monitored closely for that and be seen again in 6 months time to recheck her white count. She did not mention much about her neck or back from her cervical and lumbar strain. 30 minutes spent with the patient during this exam and discussing her case and discussing her testing needed and medications needed and sent in. Follow-up in 6 months time as arranged. Signed:  Vanessa Jimenez MD  4/8/2020  8:51 PM    Evangelina Leblanc MD  [phone]    Poornima Morgan was seen by synchronous (real-time) audio-video technology on 04/08/20. Consent:  She and/or her healthcare decision maker is aware that this patient-initiated Telehealth encounter is a billable service, with coverage as determined by her insurance carrier. She is aware that she may receive a bill and has provided verbal consent to proceed: Yes    I was in the office while conducting this encounter. Pursuant to the emergency declaration under the [facility][note 3][facility] waiver authority and the [employer] and [employer] Act, this Virtual  Visit was conducted, with patient's consent, to reduce the patient's risk of exposure to COVID-19 and provide continuity of care for an established patient. Services were provided through a video synchronous discussion virtually to substitute for in-person clinic visit. This note will not be viewable in [address]

## 2020-08-10 ENCOUNTER — TELEPHONE (OUTPATIENT)
Dept: NEUROLOGY | Age: 35
End: 2020-08-10
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
## 2020-08-13 ENCOUNTER — OFFICE VISIT (OUTPATIENT)
Dept: NEUROLOGY | Age: 35
End: 2020-08-13
Payer: COMMERCIAL

## 2020-08-13 VITALS
HEART RATE: 75 BPM | HEIGHT: 64 IN | SYSTOLIC BLOOD PRESSURE: 106 MMHG | WEIGHT: 129 LBS | OXYGEN SATURATION: 99 % | DIASTOLIC BLOOD PRESSURE: 58 MMHG | BODY MASS INDEX: 22.02 KG/M2
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
## 2020-08-13 DIAGNOSIS — Z86.69 HISTORY OF OPTIC NEURITIS: ICD-10-CM

## 2020-08-13 DIAGNOSIS — G35 MULTIPLE SCLEROSIS AFFECTING PREGNANCY IN SECOND TRIMESTER (HCC): ICD-10-CM

## 2020-08-13 DIAGNOSIS — G35 MS (MULTIPLE SCLEROSIS) (HCC): Primary | ICD-10-CM

## 2020-08-13 DIAGNOSIS — O99.352 MULTIPLE SCLEROSIS AFFECTING PREGNANCY IN SECOND TRIMESTER (HCC): ICD-10-CM

## 2020-08-13 PROBLEM — G89.18 POST-OP PAIN: Status: RESOLVED | Noted: 2019-02-14 | Resolved: 2020-08-13

## 2020-08-13 PROBLEM — Z34.90 SUPERVISION OF NORMAL PREGNANCY: Status: ACTIVE | Noted: 2020-06-25

## 2020-08-13 PROBLEM — O99.350 MULTIPLE SCLEROSIS AFFECTING PREGNANCY (HCC): Status: ACTIVE | Noted: 2020-06-25

## 2020-08-13 PROCEDURE — 99215 OFFICE O/P EST HI 40 MIN: CPT | Performed by: PSYCHIATRY & NEUROLOGY

## 2020-08-13 RX ORDER — ACETAMINOPHEN 325 MG/1
TABLET ORAL
COMMUNITY
End: 2022-03-02 | Stop reason: ALTCHOICE

## 2020-08-13 NOTE — PATIENT INSTRUCTIONS
Go to urgent care to have the stitches looked at    We can apply out of work for 6 weeks and sure there be paperwork that needs to be filled out so please make sure that gets to me so we can do it in a timely fashion    MRIs have been ordered that will have to be without contrast due to pregnancy    I will see back in follow-up in 6 weeks sooner if needed    For right now I do not want you driving
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
## 2020-08-26 ENCOUNTER — HOSPITAL ENCOUNTER (OUTPATIENT)
Dept: MRI IMAGING | Age: 35
Discharge: HOME | End: 2020-08-26
Payer: COMMERCIAL

## 2020-08-26 PROCEDURE — 70551 MRI BRAIN STEM W/O DYE: CPT

## 2020-08-26 PROCEDURE — 72141 MRI NECK SPINE W/O DYE: CPT

## 2020-08-26 PROCEDURE — 72146 MRI CHEST SPINE W/O DYE: CPT

## 2020-08-27 DIAGNOSIS — G35 MS (MULTIPLE SCLEROSIS) (HCC): ICD-10-CM

## 2020-08-28 ENCOUNTER — TELEPHONE (OUTPATIENT)
Dept: NEUROLOGY | Age: 35
End: 2020-08-28

## 2020-08-28 NOTE — TELEPHONE ENCOUNTER
Called patient, ID verified times 2. Patient made aware her form has been completed. Patient stated to fax it back to her and she will send it in. Patient's form was faxed to [phone], confirmation received.

## 2020-09-22 ENCOUNTER — TELEPHONE (OUTPATIENT)
Dept: NEUROLOGY | Age: 35
End: 2020-09-22

## 2020-09-22 NOTE — TELEPHONE ENCOUNTER
Yes she should have an appointment prior to her return for work and not sure why her appointment was scheduled after that  So see if you can squeeze her in sometime tomorrow we can do a virtual visit or we can do an in person visit what ever works for her

## 2020-09-22 NOTE — TELEPHONE ENCOUNTER
----- Message from Francesca Parks sent at 9/22/2020  1:14 PM EDT -----  Regarding: ARIEL Colon/telephone  Pt would like to see if she is suppose to come in sooner for her appt because her paperwork is saying she is suppose to return back to work on 9/2/4.  Contact is [phone]

## 2020-09-23 NOTE — TELEPHONE ENCOUNTER
Returned call to patient, ID verified times 2. Patient's appointment moved to 9/24/20 at 10:15 am. Patient made aware the form faxed over was too dark in color to read. Patient stated she will send over a new form.

## 2020-09-24 ENCOUNTER — VIRTUAL VISIT (OUTPATIENT)
Dept: NEUROLOGY | Age: 35
End: 2020-09-24
Payer: COMMERCIAL

## 2020-09-24 DIAGNOSIS — M54.50 ACUTE LEFT-SIDED LOW BACK PAIN WITHOUT SCIATICA: ICD-10-CM

## 2020-09-24 DIAGNOSIS — E61.1 LOW IRON: ICD-10-CM
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
## 2020-09-24 DIAGNOSIS — G35 MS (MULTIPLE SCLEROSIS) (HCC): ICD-10-CM

## 2020-09-24 DIAGNOSIS — Z86.69 HISTORY OF OPTIC NEURITIS: ICD-10-CM

## 2020-09-24 DIAGNOSIS — G35 MULTIPLE SCLEROSIS AFFECTING PREGNANCY IN SECOND TRIMESTER (HCC): ICD-10-CM

## 2020-09-24 DIAGNOSIS — O99.352 MULTIPLE SCLEROSIS AFFECTING PREGNANCY IN SECOND TRIMESTER (HCC): ICD-10-CM

## 2020-09-24 PROCEDURE — 99215 OFFICE O/P EST HI 40 MIN: CPT | Performed by: PSYCHIATRY & NEUROLOGY

## 2020-09-24 RX ORDER — SWAB
1 SWAB, NON-MEDICATED MISCELLANEOUS DAILY
COMMUNITY
End: 2021-04-23 | Stop reason: ALTCHOICE
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
## 2020-09-24 NOTE — LETTER
NOTIFICATION RETURN TO WORK / SCHOOL 
 
9/24/2020 10:57 AM 
 
Ms. [address] [place] [address] To Whom It May Concern: 
 
David Rock is currently under the care of [address] [place] [address]. She will return to work/school on October 8th If there are questions or concerns please have the patient contact our office. Sincerely, Trinidad Ventura, NP
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
## 2020-10-07 ENCOUNTER — VIRTUAL VISIT (OUTPATIENT)
Dept: NEUROLOGY | Age: 35
End: 2020-10-07
Payer: COMMERCIAL

## 2020-10-07 DIAGNOSIS — Z86.69 HISTORY OF OPTIC NEURITIS: ICD-10-CM

## 2020-10-07 DIAGNOSIS — O99.352 MULTIPLE SCLEROSIS AFFECTING PREGNANCY IN SECOND TRIMESTER (HCC): ICD-10-CM

## 2020-10-07 DIAGNOSIS — E61.1 LOW IRON: ICD-10-CM
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
## 2020-10-07 DIAGNOSIS — G35 MULTIPLE SCLEROSIS AFFECTING PREGNANCY IN SECOND TRIMESTER (HCC): ICD-10-CM

## 2020-10-07 DIAGNOSIS — M54.50 ACUTE LEFT-SIDED LOW BACK PAIN WITHOUT SCIATICA: ICD-10-CM

## 2020-10-07 DIAGNOSIS — G35 MS (MULTIPLE SCLEROSIS) (HCC): Primary | ICD-10-CM

## 2020-10-07 PROCEDURE — 99215 OFFICE O/P EST HI 40 MIN: CPT | Performed by: PSYCHIATRY & NEUROLOGY

## 2020-10-07 NOTE — PATIENT INSTRUCTIONS
From a neurologic perspective you are probably good to return to work From a neurologic perspective I am not seeing anything that would indicate that you should not be driving so you are cleared to drive However you have complicating issues related to pregnancy and urinary tract infection Going forward I think work restriction should come from an OB standpoint as that is gone to be more aggressive than a neurologic standpoint and I do not have the expertise to determine the appropriate work restrictions related to [facility] and pregnancy so that will need to come from your OBs office We will have you check back with us in a month I am available to you sooner if needed
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
## 2020-11-18 ENCOUNTER — VIRTUAL VISIT (OUTPATIENT)
Dept: NEUROLOGY | Age: 35
End: 2020-11-18
Payer: COMMERCIAL

## 2020-11-18 ENCOUNTER — TELEPHONE (OUTPATIENT)
Dept: NEUROLOGY | Age: 35
End: 2020-11-18

## 2020-11-18 DIAGNOSIS — Z86.69 HISTORY OF OPTIC NEURITIS: ICD-10-CM

## 2020-11-18 DIAGNOSIS — G35 MS (MULTIPLE SCLEROSIS) (HCC): Primary | ICD-10-CM

## 2020-11-18 DIAGNOSIS — N39.0 URINARY TRACT INFECTION WITHOUT HEMATURIA, SITE UNSPECIFIED: ICD-10-CM
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
## 2020-11-18 DIAGNOSIS — E61.1 LOW IRON: ICD-10-CM

## 2020-11-18 PROCEDURE — 99215 OFFICE O/P EST HI 40 MIN: CPT | Performed by: PSYCHIATRY & NEUROLOGY

## 2020-11-18 RX ORDER — ASCORBIC ACID 250 MG
TABLET ORAL
COMMUNITY
End: 2021-04-23 | Stop reason: ALTCHOICE

## 2020-11-18 NOTE — TELEPHONE ENCOUNTER
Called pt mailbox full, unable to leave vm.    Calling to inform pt VV was amrit for 5/18/20 at 1:30 pm

## 2020-11-18 NOTE — PATIENT INSTRUCTIONS
As we discussed once we are ready to restart drug modifying therapy for the MS I would recommend Alice Moya as this is a 2-year therapy we can further discuss this going forward but certainly go to reputable websites such as the [employer] or the [facility] for further discussion regarding the medication And we can further discuss this more ready to start DMT therapy We would hold off on DMT therapy until after you are done breast-feeding If the MS starts to progress or flareup we would have further discussion as to what is the best course of action to take at that time The most recent data suggests that exclusive breast-feeding is neuroprotective with MS Make sure you get good rest [stop laughing as we all know what life is like with a  in the house] and continue with best balance of lifestyle possible to help reduce overall stress In the meantime please have a wonderful holiday season and a happy new year We will set up a virtual visit in 6 months just for check-in and of course were available to you sooner should you need it If you feel like you are having problems related to MS please send me a note through my chart as this is the most effective method of communication in this area of Covid due to the large volume of telehealth visits Office Policies 
 
o Phone calls/patient messages: Please allow up to 24 hours for someone in the office to contact you about your call or message. Be mindful your provider may be out of the office or your message may require further review. We encourage you to use [employer] for your messages as this is a faster, more efficient way to communicate with our office 
 
o Medication Refills: 
Prescription medications require up to 48 business hours to process. We encourage you to use [employer] for your refills. For controlled medications: Please allow up to 72 business hours to process.  Certain medications may require you to  a written prescription at our office. NO narcotic/controlled medications will be prescribed after 4pm Monday through Friday or on weekends 
 
o Form/Paperwork Completion: We ask that you allow 7-14 business days. You may also download your forms to [employer] to have your doctor print off.
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
## 2020-11-24 ENCOUNTER — TELEPHONE (OUTPATIENT)
Dept: NEUROLOGY | Age: 35
End: 2020-11-24

## 2021-01-07 ENCOUNTER — TELEPHONE (OUTPATIENT)
Dept: NEUROLOGY | Age: 36
End: 2021-01-07

## 2021-01-19 ENCOUNTER — VIRTUAL VISIT (OUTPATIENT)
Dept: NEUROLOGY | Age: 36
End: 2021-01-19
Payer: COMMERCIAL

## 2021-01-19 DIAGNOSIS — G35 EXACERBATION OF MULTIPLE SCLEROSIS (HCC): ICD-10-CM

## 2021-01-19 DIAGNOSIS — H46.9 OPTIC NEUROPATHY, LEFT: ICD-10-CM

## 2021-01-19 DIAGNOSIS — Z86.69 HISTORY OF OPTIC NEURITIS: ICD-10-CM

## 2021-01-19 DIAGNOSIS — N39.0 CHRONIC UTI: ICD-10-CM

## 2021-01-19 DIAGNOSIS — G35 MS (MULTIPLE SCLEROSIS) (HCC): Primary | ICD-10-CM

## 2021-01-19 DIAGNOSIS — Z11.59 ENCOUNTER FOR SCREENING FOR OTHER VIRAL DISEASES: ICD-10-CM

## 2021-01-19 PROCEDURE — 99215 OFFICE O/P EST HI 40 MIN: CPT | Performed by: PSYCHIATRY & NEUROLOGY

## 2021-01-19 NOTE — PATIENT INSTRUCTIONS
Per our discussion We are going to order MRI scans and blood work We are going to defer on steroids presently but if the vision starts to get worse again let me know and we will order the IV Solu-Medrol for 3 days especially in light of the fact that we will still try to get rid of urinary tract infection placing you on steroids will make this more difficult Paperwork to start the Aub[employer] should be in this packet please fill out your section and when your  comes to  the samples of the Aubagio please return the paperwork for us to send to the company to get this processed Regarding cognitive issues right now you have just delivered a  you been breast-feeding you are not getting much sleep was just see how things go over time. Not to mention you have had a chronic infection which is going to affect your overall ability to process information Although we did not discuss this, I do strongly recommend that you get the Covid vaccination when it becomes available to you there are no contraindications from a neurologic perspective or from a medical perspective based on our medical history review. Although you may want to discuss further with your primary care or another specialist to make sure there is no other concerns related to your health condition. Below is a website you can go to to get further information about the vaccine and vaccine scheduling: 
 
[email or web address] 
 
I strongly encourage you to use my chart if you need to contact us. Presently with a high utilization of telehealth it is very difficult to get through on her phone lines. If you have not already activated my chart or you forget your password their instructions in this packet to get my chart activated. Office Policies 
 
o Phone calls/patient messages: Please allow up to 24 hours for someone in the office to contact you about your call or message. Be mindful your provider may be out of the office or your message may require further review. We encourage you to use [employer] for your messages as this is a faster, more efficient way to communicate with our office 
 
o Medication Refills: 
Prescription medications require up to 48 business hours to process. We encourage you to use [employer] for your refills. For controlled medications: Please allow up to 72 business hours to process. Certain medications may require you to  a written prescription at our office. NO narcotic/controlled medications will be prescribed after 4pm Monday through Friday or on weekends 
 
o Form/Paperwork Completion: We ask that you allow 7-14 business days. You may also download your forms to [employer] to have your doctor print off.

## 2021-01-19 NOTE — PROGRESS NOTES
[address] 84  TELEHEALTH Virtual FOLLOW-UP VISIT        Ayesha Martinez is a 29 y.o. female who was seen by synchronous (real-time) audio-video technology on 2021. Ayesha Martinez is a 29 y.o. female who presents today for the following:  Chief Complaint   Patient presents with    Follow-up     had c section in dec. has been getting utis since sept, and still not cleared up    [facility]     lt eye blurry vision , distance         ASSESSMENT AND PLAN    Multiple sclerosis  Possibly with recent left optic neuritis but currently improving  Discussed use of steroid therapy but patient is about to start on her fifth antibiotic for UTI and her vision is significantly improved and she is still dealing with a  so has been agreed to defer steroid therapy at this time    Neuro imaging will be ordered since she is having a probable exacerbation being symptomatic with probable optic neuritis in the left eye  Full neuro imaging has been ordered. This is necessary secondary the fact that she could have additional lesion activity that is presently not symptomatic or could be contributory to chronic bladder issues.   This will also give us a form baseline with which should work going forward as we start DMT therapy to be able to assess efficacy of the therapy    Blood work will also be ordered    She does need to restart on a DMT this was discussed right now we have decided on Aubagio as this is more of an immune modulator than an immunosuppressant and again with her current issues related to chronic UTI I would prefer to stay away from immunosuppression at this time    Cognitive impairment  Possibly related to [address] 88 but I think right now the biggest issue in concern is the fact that she is probably sleep deprived and exhausted dealing with a  and breast-feeding which is stopping as of today      ICD-10-CM ICD-9-CM    1. MS (multiple sclerosis) ([facility]  G38 281 2. History of optic neuritis  Z86.69 V12.49    3. Optic neuropathy, left - recurrent  H46.9 377.39    4. Exacerbation of multiple sclerosis ([address]  G35 340    5. Chronic UTI  N39.0 599.0          I attest that 40 minutes was spent on today's visit reviewing medical records and diagnostic testing deemed pertinent to this patient's care, along with direct time spent at patient's visit including the history, physical assessment and plan, discussing diagnosis and management along with documentation.       HPI  Historical Data  Historical Data patient patient is known to the practice and was previously seen by Dr. Анна Caruso        Neurologic diagnosis  Multiple sclerosis: Diagnosed 2011 presented with left optic neuritis  MRI with moderate to severe lesion load in the head  MRI of the cervical spin with evidence of demyelinating lesions  Spinal tap completed in 2011 showed elevated IgG synthesis oligoclonal banding and myelin basic protein elevation  Vitamin D deficiency     Prior DMT:   Copaxone  Tecfidera     History of optic neuritis left eye    Pertinent diagnostic testing  MRI scans from August 2020 were stable without change           Interim Data:   MS  Still off DMT due to breastfeeding [delivered Dec 20, 2020]  But states she is going to have to stop breast-feeding due to UTI so ready to resume DMT therapy  She would prefer not to go back on Tecfidera due to the twice daily dosing making it difficult to maintain adherence    Current concerns:  Now stating Lt eye vision difficulties of blurred vision and difficult to focus   Started several weeks ago and has improved w time    Having recurrent UTIs since September 2020  Usually Pseudomonas  Has started a new antibiotic this week       Low iron   Patient's iron count is extremely low and her OB is discussing iron transfusion  Per  there was some suggestion that this iron count was low prior to her accident and is wondering if that could have been contributory due to fatigue   Iron transfusion: completed on the 5th of October, 2020: energy some better  Office visit 11/18/2020: Patient has had 2 iron transfusions and is doing well; she states her iron count is actually a bit high now  OV 1/18/2020: No mention of issues today     Cognition  Patient is also continuing to have problems with cognition memory and processing which the  tells me predated the accident and is wondering if the low iron count was contributory as well  This did not seem to get worse with the accident  OV 11/18/2020: No complaints at today's office visit  OV 1/19/2020: States her  feels as though she is cognitively cloudy the patient did just deliver a beautiful baby girl on December 20 she is not getting much sleep for obvious reasons     UTI: Per patient report she is probably going to get hospitalized for treatment of this UTI  OV 11/18/2020: Patient did go into the hospital for treatment with IV antibiotics; she is waiting on results of most recent UA to see if the UTI is cleared  OV 1/19/2020: Still with UTI no antibiotic being started today            Allergies   Allergen Reactions    Sulfa (Sulfonamide Antibiotics) Hives       Current Outpatient Medications   Medication Sig    ascorbic acid, vitamin C, (Vitamin C) 250 mg tablet Take  by mouth.  cranberry fruit extract (CRANBERRY PO) Take  by mouth.  prenatal vit-iron fumarate-fa (PRENATAL PLUS with IRON) 28 mg iron- 800 mcg tab Take 1 Tab by mouth daily.  acetaminophen (TylenoL) 325 mg tablet Take  by mouth every four (4) hours as needed for Pain.  TECFIDERA 240 mg cpDR TAKE 1 CAPSULE (240MG) BY MOUTH TWICE DAILY    albuterol (PROVENTIL HFA, VENTOLIN HFA, PROAIR HFA) 90 mcg/actuation inhaler albuterol (refill) 90 mcg/actuation aerosol inhaler   Inhale 1 puff every 4 hours by inhalation route.  fluticasone (FLONASE) 50 mcg/Actuation nasal spray 2 Sprays by Nasal route daily. Administer to  nostril.      PNV Comb #2-Iron-FA-Omega 3 29-1-400 mg cmpk Take  by mouth.  tiZANidine (ZANAFLEX) 2 mg tablet Take 1 Tab by mouth three (3) times daily as needed for Muscle Spasm(s).  aspirin delayed-release 81 mg tablet Take  by mouth daily.  cholecalciferol, vitamin D3, (VITAMIN D3) 2,000 unit tab Take  by mouth.  b complex vitamins (B COMPLEX 1) tablet Take 1 Tab by mouth daily.  ibuprofen (MOTRIN) 800 mg tablet Take 1 Tab by mouth every eight (8) hours as needed for Pain. No current facility-administered medications for this visit. Past medical history/surgical history, family history, and social history have been reviewed for today's visit      ROS    A ten system review of constitutional, cardiovascular, respiratory, musculoskeletal, endocrine, skin, SHEENT, genitourinary, psychiatric and neurologic systems was obtained and is unremarkable except as mentioned under HPI          EXAMINATION: Within the context of virtual telehealth visit:    General appearance: Patient is well-developed and well-nourished in no apparent distress and well groomed.     Psych/mental health:  Affect: Appropriate    PHQ  3 most recent PHQ Screens 12/22/2017   Little interest or pleasure in doing things Not at all   Feeling down, depressed, irritable, or hopeless Not at all   Total Score PHQ 2 0       HEENT:   Normocephalic  With evidence of trauma: No  Full range of motion head neck: Yes  Tenderness to palpation of the head neck region: N/A      Cardiovascular:     Extremities warm to touch: N/A  Extremity swelling: No  Discoloration: No  Evidence of PVD: No    Respiratory:   Dyspnea on exertion: No   Abnormal effort on casual observation: No   Use of portable oxygen: No   Evidence of cyanosis: No     Musculoskeletal:   Evidence of significant bone deformities: No   Spinal curvature: No     Integumentary:    Obvious bruising: No   Lacerations or discoloration on casual observation: No       Neurological Examination:   Mental Status:        MMSE  No flowsheet data found. Formal testing was not completed    there was nothing concerning on general observation and discussion. Alert oriented and appropriate to general conversation  Normal processing on general observation  Followed conversation and responded seemingly appropriate throughout the office visit  No word finding difficulties noted on casual observation  Able to follow directions without difficulty     Cranial Nerves:    Grossly intact    Motor:   Normal bulk  No tremor appreciated on today's exam  No abnormal movements appreciated on today's exam  Moves extremities spontaneously and with purpose      Sensation: Not tested    Coordination/Cerebellar:   Grossly intact    Gait: Not tested    Fall risk assessment  Fall Risk Assessment, last 12 mths 3/25/2014   Able to walk? Yes   Fall in past 12 months? No           Due to this being a TeleHealth evaluation, many elements of the physical examination are unable to be assessed. Pursuant to the emergency declaration under the [facility], [facility] waiver authority and the [employer] and [employer] Act, this Virtual  Visit was conducted, with patient's consent, to reduce the patient's risk of exposure to COVID-19 and provide continuity of care for an established patient. Services were provided through a video synchronous discussion virtually to substitute for in-person clinic visit.           Otf Carlisle, MS, ANP-BC, [facility]

## 2021-02-15 ENCOUNTER — TELEPHONE (OUTPATIENT)
Dept: NEUROLOGY | Age: 36
End: 2021-02-15

## 2021-02-16 PROBLEM — O23.40 UTI (URINARY TRACT INFECTION) IN PREGNANCY, ANTEPARTUM: Status: RESOLVED | Noted: 2020-09-03 | Resolved: 2021-02-16

## 2021-02-16 PROBLEM — Z34.90 SUPERVISION OF NORMAL PREGNANCY: Status: RESOLVED | Noted: 2020-06-25 | Resolved: 2021-02-16

## 2021-02-16 PROBLEM — O99.019 ANEMIA AFFECTING PREGNANCY: Status: RESOLVED | Noted: 2020-09-03 | Resolved: 2021-02-16

## 2021-02-16 PROBLEM — O00.90 ECTOPIC PREGNANCY: Status: RESOLVED | Noted: 2019-02-14 | Resolved: 2021-02-16

## 2021-02-16 PROBLEM — G35 MULTIPLE SCLEROSIS AFFECTING PREGNANCY (HCC): Status: RESOLVED | Noted: 2020-06-25 | Resolved: 2021-02-16

## 2021-02-16 PROBLEM — O99.019 ANEMIA AFFECTING PREGNANCY: Status: ACTIVE | Noted: 2020-09-03

## 2021-02-16 PROBLEM — O23.40 UTI (URINARY TRACT INFECTION) IN PREGNANCY, ANTEPARTUM: Status: ACTIVE | Noted: 2020-09-03

## 2021-02-16 PROBLEM — Z3A.37 37 WEEKS GESTATION OF PREGNANCY: Status: ACTIVE | Noted: 2020-12-02

## 2021-02-16 PROBLEM — O99.350 MULTIPLE SCLEROSIS AFFECTING PREGNANCY (HCC): Status: RESOLVED | Noted: 2020-06-25 | Resolved: 2021-02-16

## 2021-02-16 PROBLEM — Z3A.37 37 WEEKS GESTATION OF PREGNANCY: Status: RESOLVED | Noted: 2020-12-02 | Resolved: 2021-02-16

## 2021-03-07 ENCOUNTER — HOSPITAL ENCOUNTER (OUTPATIENT)
Dept: MRI IMAGING | Age: 36
Discharge: HOME | End: 2021-03-07
Payer: COMMERCIAL

## 2021-03-07 DIAGNOSIS — H46.9 OPTIC NEUROPATHY, LEFT: ICD-10-CM

## 2021-03-07 DIAGNOSIS — G35 MS (MULTIPLE SCLEROSIS) (HCC): ICD-10-CM

## 2021-03-07 DIAGNOSIS — G35 EXACERBATION OF MULTIPLE SCLEROSIS (HCC): ICD-10-CM

## 2021-03-07 DIAGNOSIS — Z86.69 HISTORY OF OPTIC NEURITIS: ICD-10-CM

## 2021-03-07 PROCEDURE — 70553 MRI BRAIN STEM W/O & W/DYE: CPT

## 2021-03-07 PROCEDURE — 72156 MRI NECK SPINE W/O & W/DYE: CPT

## 2021-03-07 PROCEDURE — 72157 MRI CHEST SPINE W/O & W/DYE: CPT

## 2021-03-07 PROCEDURE — 74011250636 HC RX REV CODE- 250/636

## 2021-03-07 PROCEDURE — A9575 INJ GADOTERATE MEGLUMI 0.1ML: HCPCS

## 2021-03-07 RX ORDER — GADOTERATE MEGLUMINE 376.9 MG/ML
10 INJECTION INTRAVENOUS
Status: COMPLETED | OUTPATIENT
Start: 2021-03-07 | End: 2021-03-07

## 2021-03-07 RX ADMIN — GADOTERATE MEGLUMINE 10 ML: 376.9 INJECTION INTRAVENOUS at 11:42

## 2021-04-23 ENCOUNTER — VIRTUAL VISIT (OUTPATIENT)
Dept: NEUROLOGY | Age: 36
End: 2021-04-23
Payer: COMMERCIAL

## 2021-04-23 DIAGNOSIS — M54.50 ACUTE LEFT-SIDED LOW BACK PAIN WITHOUT SCIATICA: ICD-10-CM

## 2021-04-23 DIAGNOSIS — G35 EXACERBATION OF MULTIPLE SCLEROSIS (HCC): ICD-10-CM

## 2021-04-23 DIAGNOSIS — Z86.69 HISTORY OF OPTIC NEURITIS: ICD-10-CM

## 2021-04-23 DIAGNOSIS — O99.352 MULTIPLE SCLEROSIS AFFECTING PREGNANCY IN SECOND TRIMESTER (HCC): ICD-10-CM

## 2021-04-23 DIAGNOSIS — G35 MULTIPLE SCLEROSIS AFFECTING PREGNANCY IN SECOND TRIMESTER (HCC): ICD-10-CM

## 2021-04-23 DIAGNOSIS — G35 MS (MULTIPLE SCLEROSIS) (HCC): Primary | ICD-10-CM

## 2021-04-23 DIAGNOSIS — G35 MULTIPLE SCLEROSIS, RELAPSING-REMITTING (HCC): ICD-10-CM

## 2021-04-23 PROCEDURE — 99214 OFFICE O/P EST MOD 30 MIN: CPT | Performed by: PSYCHIATRY & NEUROLOGY

## 2021-04-23 NOTE — LETTER
4/23/2021 6:53 PM 
 
Patient:  More Bee YOB: 1985 Date of Visit: 4/23/2021 Dear No Recipients: Thank you for referring Ms. Jayme Connelly to me for evaluation/treatment. Below are the relevant portions of my assessment and plan of care. Neurology Progress Note Patient ID: 
More Bee 
[id number] 
88 y.o. 
1985 CHIEF COMPLAINT: Blurred vision in the left eye Subjective:  
  
Patient is a 71-year-old right-handed female seen today on urgent work in basis at the request of Dr. Marilu Vera for evaluation of new problem of patient getting pregnant, and stopping her medication over a year ago, and never restarting the medication since then. She was last seen in virtual visit 1 year ago, and feels that she has now gotten worse, with some more blurred vision in her eyes, and generalized weakness, and dragging her legs more, and limping more and being more unsteady on her feet. Her  states that she was like that even before the pregnancy, but is gotten worse since that time. The patient was seen in a virtual visit by nurse practitioner Kia Craven in February and had an MRI of the brain and cervical spine ordered and the thoracic spine, both with and without contrast for all 3, and they all showed stable disease without any new lesions.   So I think what she is feeling is more pseudoexacerbations from her recent urinary tract infection which was severe, in the postpartum period and generalized weakness, and she needs to get on a disease modifying drug, and agrees to try Aubagio after this was discussed with her by the nurse practitioner for in February, so a starter form was filled out today, and she was advised that she needs a blood test every month for the next 6 months, and they were sent to her also, and she is going to print off her lab test from [facility] where she was seen for her UTI and pregnancy, to make sure that her lab tests are stable before we start the Aubagio. She just had the recent MRI so we do not need that yet. We will place her in physical therapy and told her that she may need short-term disability for about 3 months to try to build up her strength and balance and coordination. She still has some blurred vision in her eyes, but is not quite as bad as it was 2 months ago. She was previously on Tecfidera but had a new lesion on her MRI scan was being switched to Aubagio. Patient has not been seen in over a year, and had the pregnancy with worsening neurologic status as above. Patient has tolerated the medication well without side effect, has no GI side effects, no diarrhea, no flushing, and no unusual infections or other problems. We reviewed her MRI scans all personally on the PACS system and I agree with report as dictated and discussed that with the patient and her  in detail. Her metabolic parameters and hematologic parameters were also checked to make sure that she is safe with the medication that have marked leukopenia. 3 years ago she has had an episode of optic neuritis in the left eye, and was given IV steroids with some improvement, and her visual acuity is back to 2020 with handcard and near vision today. She is being switched to Aubagio because she had a new lesion last year MRI. She is to get back on her medications including vitamin D and multivitamins and continue her muscle relaxers as needed She has a visual acuity of 20/20, but has a subjective blurring and slight decreased color desaturation in the left eye as compared to the right at last visit. Patient had an abnormal MRI scan of the brain and the cervical spine showing demyelinating lesions some of which were enhancing in both the brain and the cervical spine last year.   The patient had a previous workup in 2011 with abnormal spinal fluid suggesting demyelinating disease with elevated IgG synthesis rate, oligoclonal bands and myelin basic protein, and an abnormal MRI of the brain and cervical spine then, but she never returned for follow-up. She also had a previous episode in 2005 when I saw her 12 years ago, at that time her workup was negative for any signs of MS. She is better, and able to walk without assistive devices, but her  does say she is at times clumsy and seems more forgetful. Her vitamin D level was low and she is to start a B complex vitamin and 2000 units of vitamin D every day. She needs physical therapy and I told her that it would be okay for her to go on disability for short-term for 3 months so she gets her strength back, and recovers from her delivery and probable exacerbation of her MS. 35 minutes spent with the patient and her  in the office today, going over all the different medications, giving her information to read on them, advising her of the risks, benefits and side effects of all the different medications. Past Medical History:  
Diagnosis Date  37 weeks gestation of pregnancy 12/2/2020 Added automatically from request for surgery [id number]  Anemia affecting pregnancy 9/3/2020 Diagnosed 9/2/2020 : H&H 9.6/31 , Ferritin 13 Symptoms: none  If ferritin is <30, then:  --Second trimester Hgb <10.5 g/dL:  --IV iron  PLAN: - messaged pt about starting IV iron on 09/03/20 - patient scheduled at infusion center on 10/5/20 Chelsie ] recheck CBC 11/ 2020 - Hgb 10.9, ferritin 365, discontinued oral iron due to high ferritin. Last Assessment & Plan:  11/12 Hgg 10.9, ferritin 365, disc  Asthma  Ectopic pregnancy 2/14/2019  Gastrointestinal disorder   
 reflux  GERD (gastroesophageal reflux disease)  Multiple sclerosis ([address]  Multiple sclerosis affecting pregnancy ([facility] 6/25/2020 Was taking Tecfidera before pregnancy, but has since stopped it. Only MS symptoms are vision loss in her left eye. Last attack was in 2017.   --Discussed that pregnancy is associated with a significant decrease in MS disease activity and postpartum period is associated with an increase in MS activity. --Acute MS attacks (relapses) during pregnancy can be treated with intravenous glucocorticoids, w  
 Multiple sclerosis affecting pregnancy ([address] 6/25/2020 Was taking Tecfidera before pregnancy, but has since stopped it. Only MS symptoms are vision loss in her left eye. Last attack was in 2017. --Discussed that pregnancy is associated with a significant decrease in MS disease activity and postpartum period is associated with an increase in MS activity. --Acute MS attacks (relapses) during pregnancy can be treated with intravenous glucocorticoids, w  
 Post-op pain 2/14/2019  Supervision of normal pregnancy 6/25/2020 Dated by LMP Genetic screening: declined Immunization: TDaP at 28 weeks, influenza **, VZV immune (?) rubella immune Rhogam: Not indicated, patient O pos 1hr GTT at 28 weeks GBS at 35 weeks Breastfeeding: Plans to breast feed PP Contraception: need to address Total weight gain: 6 lb 9.6 oz (2.994 kg)  Needs urine NING.  Supervision of normal pregnancy 6/25/2020 Dated by LMP Genetic screening: declined Immunization: TDaP at 28 weeks, influenza **, VZV immune (?) rubella immune Rhogam: Not indicated, patient O pos 1hr GTT at 28 weeks GBS at 35 weeks Breastfeeding: Plans to breast feed PP Contraception: need to address Total weight gain: 6 lb 9.6 oz (2.994 kg)  Needs urine NING. Dated by LMP Genetic screening: declined Immunization: TDaP at 28 weeks 10/1, i  
 UTI (urinary tract infection) in pregnancy, antepartum 9/3/2020 Initial visit:E.coli UTI diagnosed at initial visit. Treated with Keflex. NING Urine Sample collected on 9/2 for culture to ensure clearance of UTI - UTI cleared 10/1: denies symptoms 10/12: Found to have pseudomonas UTI, treated with 2g of IM cefepime q12h for 6 doses. Completed on 10/18/2020. 10/29: Urine culture ordered for test of cure, still positive for Pseudomonas.  11/12: Patient s/p Fosfomy Past Surgical History:  
Procedure Laterality Date  HX GYN  10/2013  
 c section  HX HEENT    
 tonsils, adenoids, PE tubes [unfilled] Social History Tobacco Use  Smoking status: Never Smoker  Smokeless tobacco: Never Used Substance Use Topics  Alcohol use: Not Currently Frequency: Never Allergies Allergen Reactions  Sulfa (Sulfonamide Antibiotics) Hives Review of Systems: A comprehensive review of systems was negative except for: Constitutional: positive for fatigue and malaise Eyes: positive for visual disturbance Neurological: positive for coordination problems, gait problems, weakness and Visual loss Objective: 
 
 
Objective:  
 
@IPVITALS(24:)@ Lab Review No results found for this or any previous visit (from the past 24 hour(s)). Additional comments:I personally viewed and interpreted the patient's MRI scans reviewed personally on the PACS system today, and they were shown to the patient NEUROLOGICAL EXAM: 
 
Appearance: The patient is well developed, well nourished, provides a coherent history and is in no acute distress. Mental Status: Oriented to time, place and person and the president, cognitive function and fund of knowledge is normal. Speech is fluent without aphasia or dysarthria. Mood and affect appropriate. Cranial Nerves:   Intact visual fields. Fundi are positive and they show optic pallor bilaterally left greater than right. No Shashank Layne pupil present. Visual acuity is 20/20 in the left eye missing only 1 number with near vision, and 20/20 in the right eye. CLEMENTE, EOM's full, no nystagmus, no ptosis. Facial sensation is normal. Corneal reflexes are not tested. Facial movement is symmetric. Hearing is normal bilaterally. Palate is midline with normal sternocleidomastoid and trapezius muscles are normal. Tongue is midline. Neck without meningismus or bruits Temporal arteries are not tender or enlarged Motor:  5/5 strength in upper and lower proximal and distal muscles. Normal bulk and tone. No fasciculations. Patient has percussion tenderness over the lumbar spine, and tenderness over the paraspinal muscles which are tight and sore Rapid alternating movement is a little slow but symmetric bilaterally Reflexes:   Deep tendon reflexes 2+/4 and symmetrical in the upper extremities, and 3+/4 in the lower extremities bilaterally. No babinski or clonus present Sensory:   Normal to touch, pinprick and temperature and vibration and temperature. DSS is intact Gait:  Abnormal gait with patient moving slowly due to spastic mildly paraplegic gait with ataxia. Finger-nose-finger examination shows no major dysmetria Tremor:   No tremor noted. Cerebellar:   Mildly abnormal Romberg and tandem cerebellar signs present. Neurovascular:  Normal heart sounds and regular rhythm, peripheral pulses intact, and no carotid bruits. Assessment: 
 
 
 
Assessment: ICD-10-CM ICD-9-CM 1. MS (multiple sclerosis) ([facility])  G35 340 teriflunomide (AUBAGIO) 14 mg tablet CBC WITH AUTOMATED DIFF  
   METABOLIC PANEL, COMPREHENSIVE REFERRAL TO PHYSICAL THERAPY 2. History of optic neuritis  Z86.69 V12.49 teriflunomide (AUBAGIO) 14 mg tablet CBC WITH AUTOMATED DIFF  
   METABOLIC PANEL, COMPREHENSIVE REFERRAL TO PHYSICAL THERAPY 3. Exacerbation of multiple sclerosis ([facility])  G35 340 teriflunomide (AUBAGIO) 14 mg tablet CBC WITH AUTOMATED DIFF  
   METABOLIC PANEL, COMPREHENSIVE REFERRAL TO PHYSICAL THERAPY 4. Acute left-sided low back pain without sciatica  M54.5 724.2 teriflunomide (AUBAGIO) 14 mg tablet CBC WITH AUTOMATED DIFF  
   METABOLIC PANEL, COMPREHENSIVE REFERRAL TO PHYSICAL THERAPY 5. Multiple sclerosis affecting pregnancy in second trimester ([facility]  O99.352 648.93 teriflunomide (AUBAGIO) 14 mg tablet  G35 340 CBC WITH AUTOMATED DIFF  
   METABOLIC PANEL, COMPREHENSIVE REFERRAL TO PHYSICAL THERAPY 6. Multiple sclerosis, relapsing-remitting (HCC)  G35 340 teriflunomide (AUBAGIO) 14 mg tablet CBC WITH AUTOMATED DIFF  
   METABOLIC PANEL, COMPREHENSIVE REFERRAL TO PHYSICAL THERAPY Plan:  
 
Patient seen for new problem of worsening neurologic status, worsening gait, more unsteady, generally weak, having vision problems, particular in the left thigh, possibly recurrent optic neuritis, and having marked fatigue and no energy and strength, and possibly is due to exacerbation of her disease. She needs to get back on her medication desperately and we went over the risk and benefits of Aubagio, explained the GI side effects, take it with meals once a day at supper, explained the hair thinning, and the need to monitor both her liver function and white count. She was also advised if she gets a rash she is to stop the medication immediately. Patient will be started on Aubagio and starter form sent in for patient and risk and benefits all explained to the patient in detail. White count will be checked again for absolute lymphocyte count, and she had a recent chemistry that showed normal liver function tests She has had no relapses and no exacerbations on the medication so far. We will repeat her MRI scan at one years time. Patient will be sent to physical therapy for gait training strengthening and balance training to try to help her recover enough to get back to work. Blood work was done today and she will be monitored closely for that and be seen again in 3 months time to recheck her white count. She will get lab test every month as we advised her, and she was advised of the GI side effects, narrow angle glaucoma, hair loss, white count compression and liver function elevation also be monitored carefully, if she gets a rash she is to stop the medication immediately.   Patient will call if any problem in view of her test results on my chart We discussed her condition with her and her family also today Follow-up in 3 months time as arranged. Signed: 
Charlie Lama MD 
4/23/2021 
8:51 PM 
 
Jessica Mueller MD 
[phone] If you have questions, please do not hesitate to call me. I look forward to following Ms. Shaan Mayer along with you. Sincerely, Charlie Lama MD

## 2021-05-10 ENCOUNTER — TELEPHONE (OUTPATIENT)
Dept: NEUROLOGY | Age: 36
End: 2021-05-10

## 2021-05-10 NOTE — TELEPHONE ENCOUNTER
Pt called in regards to noticed she needs for work, pt stated she needs them by tomorrow. Dr. Avel Angelucci- note should reference the short term disability pt was placed on and referral to physical therapy. Grace Hawk- note should mention the time period where pt was removed from work after pregnancy. [facility] paperwork  3/9 must be updated. Pt stated at the minium she at least needs a not stating why shes out and if she needs any additional paperwork she will reach out.   [address]

## 2021-05-10 NOTE — TELEPHONE ENCOUNTER
The last time I filled out FMLA paperwork was in the 2020. The more recent FMLA paperwork I cannot find in her chart I presume and actually think that was done through her OB/GYN  I had requested the patient come out of work 2021 and to forward me FMLA paperwork at that time that was never completed. So at this point I do not feel compelled to fill out FMLA paperwork or immediate notes when I have no updated data on this patient other than Dr. Jacklyn Black's note and I do not have any information about her prior FMLA paperwork that  2021    At that same office visit in February, patient and her spouse had reported that they were going to transfer care to [facility].   I am not sure where they are in this process    Dr. Rachel Del Toro saw the patient last.    If her paperwork ran out 2021 that was not through our office    So at this point I do not that is legally appropriate to fill out FMLA paperwork or immediate notes when I have no updated data on this patient other than Dr. Jacklyn Black's note and I do not have any information about her prior FMLA paperwork that  2021

## 2021-05-11 ENCOUNTER — TELEPHONE (OUTPATIENT)
Dept: NEUROLOGY | Age: 36
End: 2021-05-11

## 2021-05-11 NOTE — TELEPHONE ENCOUNTER
Called and left a message with no patient information to call back about a form.   No further information given

## 2021-05-11 NOTE — TELEPHONE ENCOUNTER
Let patient know I dictated a note for disability for the next 3 months and will mail to her or they can pick it up

## 2021-05-11 NOTE — TELEPHONE ENCOUNTER
I tried to call but no answer. Will hold letter for patient. Other note:   We can't send out office notes without a recent signed permission

## 2021-05-11 NOTE — TELEPHONE ENCOUNTER
Received call from pt, she stated that she called the office yesterday to request notes from Dr. Judi Salguero and [employer] stating why she was out of work. She said that her FMLA has run out and she needs to have the notes sent to her today.     Fax: [phone]

## 2021-05-17 RX ORDER — HYDROXYZINE 25 MG/1
25 TABLET, FILM COATED ORAL
COMMUNITY
Start: 2021-04-20

## 2021-05-17 RX ORDER — MEDROXYPROGESTERONE ACETATE 150 MG/ML
150 INJECTION, SUSPENSION INTRAMUSCULAR
COMMUNITY
Start: 2021-03-22 | End: 2022-09-27 | Stop reason: ALTCHOICE

## 2021-05-18 ENCOUNTER — VIRTUAL VISIT (OUTPATIENT)
Dept: NEUROLOGY | Age: 36
End: 2021-05-18
Payer: COMMERCIAL

## 2021-05-18 ENCOUNTER — TELEPHONE (OUTPATIENT)
Dept: NEUROLOGY | Age: 36
End: 2021-05-18

## 2021-05-18 DIAGNOSIS — H46.9 LEFT OPTIC NEURITIS: ICD-10-CM

## 2021-05-18 DIAGNOSIS — G35 MS (MULTIPLE SCLEROSIS) (HCC): Primary | ICD-10-CM

## 2021-05-18 DIAGNOSIS — R41.844 FRONTAL LOBE AND EXECUTIVE FUNCTION DEFICIT: ICD-10-CM

## 2021-05-18 DIAGNOSIS — Z86.69 HISTORY OF OPTIC NEURITIS: ICD-10-CM

## 2021-05-18 DIAGNOSIS — N39.0 CHRONIC UTI: ICD-10-CM

## 2021-05-18 PROCEDURE — 99215 OFFICE O/P EST HI 40 MIN: CPT | Performed by: PSYCHIATRY & NEUROLOGY

## 2021-05-18 NOTE — PROGRESS NOTES
[facility] Virtual FOLLOW-UP VISIT        Ashu Jo is a 29 y.o. female who was seen by synchronous (real-time) audio-video technology on 5/18/2021. Ashu Jo is a 29 y.o. female who presents today for the following:  Chief Complaint   Patient presents with    Multiple Sclerosis     has not gotten aubagio yet         ASSESSMENT AND PLAN    Multiple sclerosis  Still waiting to initiate Aubagio. MRIs from March 2021 are without change  Blood work that was ordered both in January and April is still active    Patient remains out of work presently and is due to return to work August 10, 2021 social need to follow-up prior to that date     Cognitive impairment  May need neurocognitive testing at some point          ICD-10-CM ICD-9-CM    1. MS (multiple sclerosis) ([facility] [address]  [facility]   2. History of optic neuritis  Z86.69 V12.49 REFERRAL TO NEUROLOGY   3. Chronic UTI  N39.0 599.0 REFERRAL TO NEUROLOGY   4. Left optic neuritis  H46.9 377.30    5. Frontal lobe and executive function deficit  R41.844 799.55          I attest that 40 minutes was spent on today's visit reviewing medical records and diagnostic testing deemed pertinent to this patient's care, along with direct time spent at patient's visit including the history, physical assessment and plan, discussing diagnosis and management along with documentation.       HPI  Historical Data  Historical Data patient patient is known to the practice and was previously seen by Dr. Josselyn Fenton        Neurologic diagnosis  Multiple sclerosis: Diagnosed 2011 presented with left optic neuritis  MRI with moderate to severe lesion load in the head  MRI of the cervical spin with evidence of demyelinating lesions  Spinal tap completed in 2011 showed elevated IgG synthesis oligoclonal banding and myelin basic protein elevation  Vitamin D deficiency     Prior DMT:   [employer]  [employer]     History of optic neuritis left eye     Pertinent diagnostic testing  MRI scans from August 2020 were stable without change     Pertinent comorbid conditions and issues  Severe anemia during pregnancy require iron transfusions  Recurrent severe UTI during pregnancy and postpartum being followed at [facility]        Interim Data:   MS  Still off DMT due to breastfeeding [delivered Dec 20, 2020]  We have been trying to initiate Aubagio since February 2021 this is still in process as of today's office visit     Left optic neuritis   History of blurred vision   No complaints at today's office visit     Having recurrent UTIs since September 2020  Usually Pseudomonas  Currently being followed by [facility] has been on antibiotics x5 different courses  To date is now resolved    Left side of body: tingling    Cognition  Patient continues with processing and cognitive issues  She often has to triple and quadruple check her work especially when it comes to finances            Results from [place] encounter on 03/07/21   MRI CERV SPINE W WO CONT    Impression Stable examination. Cervical cord involvement is unchanged. MRI [facility] SPINE W WO CONT    Impression 1. Stable distal spinal cord lesion compatible with the known demyelination. 2. No extrinsic disease or new findings. MRI BRAIN W WO CONT    Impression 1. No acute findings no mass or enhancing lesion. 2. Stable burden of white matter disease and atrophy. Allergies   Allergen Reactions    Sulfa (Sulfonamide Antibiotics) Hives       Current Outpatient Medications   Medication Sig    hydrOXYzine HCL (ATARAX) 25 mg tablet     medroxyPROGESTERone (DEPO-PROVERA) 150 mg/mL injection 150 mg by IntraMUSCular route.  teriflunomide (AUBAGIO) 14 mg tablet Take 1 Tab by mouth daily.  Indications: relapsing form of multiple sclerosis    sertraline (ZOLOFT) 25 mg tablet TAKE 1 TABLET BY MOUTH EVERY DAY    acetaminophen (TylenoL) 325 mg tablet Take  by mouth every four (4) hours as needed for Pain.  tiZANidine (ZANAFLEX) 2 mg tablet Take 1 Tab by mouth three (3) times daily as needed for Muscle Spasm(s).  albuterol (PROVENTIL HFA, VENTOLIN HFA, PROAIR HFA) 90 mcg/actuation inhaler albuterol (refill) 90 mcg/actuation aerosol inhaler   Inhale 1 puff every 4 hours by inhalation route.  cholecalciferol, vitamin D3, (VITAMIN D3) 2,000 unit tab Take  by mouth.  b complex vitamins (B COMPLEX 1) tablet Take 1 Tab by mouth daily.  ibuprofen (MOTRIN) 800 mg tablet Take 1 Tab by mouth every eight (8) hours as needed for Pain.  fluticasone propionate (Flonase) 50 mcg/actuation nasal spray 2 Sprays by Nasal route daily. nostril. No current facility-administered medications for this visit. Past medical history/surgical history, family history, and social history have been reviewed for today's visit      ROS    A ten system review of constitutional, cardiovascular, respiratory, musculoskeletal, endocrine, skin, SHEENT, genitourinary, psychiatric and neurologic systems was obtained and is unremarkable except as mentioned under HPI          EXAMINATION: Within the context of virtual telehealth visit:    General appearance: Patient is well-developed and well-nourished in no apparent distress and well groomed.     Psych/mental health:  Affect: Appropriate    PHQ  3 most recent PHQ Screens 12/22/2017   Little interest or pleasure in doing things Not at all   Feeling down, depressed, irritable, or hopeless Not at all   Total Score PHQ 2 0       HEENT:   Normocephalic  With evidence of trauma: No  Full range of motion head neck: Yes  Tenderness to palpation of the head neck region: N/A      Cardiovascular:     Extremities warm to touch: N/A  Extremity swelling: No  Discoloration: No  Evidence of PVD: No    Respiratory:   Dyspnea on exertion: No   Abnormal effort on casual observation: No   Use of portable oxygen: No   Evidence of cyanosis: No     Musculoskeletal:   Evidence of significant bone deformities: No   Spinal curvature: No     Integumentary:    Obvious bruising: No   Lacerations or discoloration on casual observation: No       Neurological Examination:   Mental Status:        MMSE  No flowsheet data found. Formal testing was not completed    there was nothing concerning on general observation and discussion. Alert oriented and appropriate to general conversation  Normal processing on general observation  Followed conversation and responded seemingly appropriate throughout the office visit  No word finding difficulties noted on casual observation  Able to follow directions without difficulty     Cranial Nerves:    Grossly intact    Motor:   Normal bulk  No tremor appreciated on today's exam  No abnormal movements appreciated on today's exam  Moves extremities spontaneously and with purpose      Sensation: Not tested    Coordination/Cerebellar:   Grossly intact    Gait: Not tested    Fall risk assessment  Fall Risk Assessment, last 12 mths 3/25/2014   Able to walk? Yes   Fall in past 12 months? No           Due to this being a TeleHealth evaluation, many elements of the physical examination are unable to be assessed. Pursuant to the emergency declaration under the [address] [place] [address] [facility] authority and the [employer] Act, this Virtual  Visit was conducted, with patient's consent, to reduce the patient's risk of exposure to COVID-19 and provide continuity of care for an established patient. Services were provided through a video synchronous discussion virtually to substitute for in-person clinic visit. Follow-up and Dispositions    · Return in about 10 weeks (around 7/27/2021) for Virtual visit; with Dr. Avel Angelucci.            Naveen Majano MS, ANP-BC, [facility]

## 2021-05-18 NOTE — PATIENT INSTRUCTIONS
As per our discussion I expect that you will be able to start on Aubagio within the near future. You can she does note through my chart once you have started so you are aware of your start date and will need to then set up some blood work for new onset medication drug monitoring Your MRI scans are stable and unchanged Blood work that has been ordered both in January and then again in April have still not been completed please get that done ASAP I am also can make a direct referral to [facility] at the [address] And also reach out to the nurse practitioner there to let her know to be on the look at for the referral 
When she get established at [address] [place] [address], you do not need a follow-up here at [facility] [address] they can take over your care since you are logistically closer to them and that would be more appropriate Continue activity as tolerated Please find time to rest each day and try not to push herself too much We will get you set up for the end of July with Dr. Karrie Brown That way you can be seen prior to going back to work And again if you are already established at [facility] they can then address the work issue at that time Office Policies 
 
o Phone calls/patient messages: Please allow up to 24 hours for someone in the office to contact you about your call or message. Be mindful your provider may be out of the office or your message may require further review. We encourage you to use [employer] for your messages as this is a faster, more efficient way to communicate with our office 
 
o Medication Refills: 
Prescription medications require up to 48 business hours to process. We encourage you to use [employer] for your refills. For controlled medications: Please allow up to 72 business hours to process. Certain medications may require you to  a written prescription at our office.  
 
NO narcotic/controlled medications will be prescribed after 4pm Monday through Friday or on weekends 
 
o Form/Paperwork Completion: We ask that you allow 7-14 business days. You may also download your forms to [employer] to have your doctor print off.

## 2021-05-28 ENCOUNTER — TELEPHONE (OUTPATIENT)
Dept: NEUROLOGY | Age: 36
End: 2021-05-28

## 2021-05-28 NOTE — TELEPHONE ENCOUNTER
I called the patient and notified of below information.  Will also mail her lab slip to her to get these done before she starts Aubagio

## 2021-05-28 NOTE — TELEPHONE ENCOUNTER
----- Message from kM Gerardo sent at 5/28/2021 11:19 AM EDT -----  Regarding: /telephone  General Message/Vendor Calls    Caller's first and last name:n/a      Reason for call: Medication follow up/Covid vax questions      Callback required yes/no and why: Yes      Best contact number(s): [phone]      Details to clarify the request: Pt has not received her prescription for \"AUBAGIO\", she has been without her medication since March. Pt would also like to make sure it is okay for her to get the [address], to confirm it will not present any issues with her MS.       Mk Gerardo

## 2021-05-28 NOTE — TELEPHONE ENCOUNTER
Yes it is fine for her to get the Covid vaccination. It should not interfere with her MS nor should not interfere with her MS therapy.   Agree with her calling MS one-to-one to find out what is going on with her Aubagio prescription

## 2021-05-28 NOTE — TELEPHONE ENCOUNTER
I called the patient and gave her the phone number to [facility] one to one to discuss medication start further as they have received the fax. Her question about the COVID vaccine is because she is not on therapy and wants to know if this might cause complications with her MS?

## 2021-05-29 ENCOUNTER — TELEPHONE (OUTPATIENT)
Dept: NEUROLOGY | Age: 36
End: 2021-05-29

## 2021-05-29 NOTE — TELEPHONE ENCOUNTER
Re: Lea Abbot    [id number]  [id number]  [phone]    Sent to [facility] w/ April and May notes. Key: [id number] - PA Case ID: [id number]    Status is pending. Notified Ms One to One of PA sent and pending.

## 2021-06-14 ENCOUNTER — TELEPHONE (OUTPATIENT)
Dept: NEUROLOGY | Age: 36
End: 2021-06-14

## 2021-06-14 NOTE — TELEPHONE ENCOUNTER
Patient  called in with patient in background. Alea stated that patient has been having intermittent slurred speech for over a week now. Stated for patient to seek medical attention at the nearest ER or urgent care and have the  take her.

## 2021-06-18 ENCOUNTER — TELEPHONE (OUTPATIENT)
Dept: NEUROLOGY | Age: 36
End: 2021-06-18

## 2021-06-18 NOTE — TELEPHONE ENCOUNTER
----- Message from Dennys Barragan sent at 6/18/2021 12:29 PM EDT -----  Regarding: Dr. Ye[facility]  General Message/Vendor Calls    Caller's first and last name: Pt      Reason for call: form that was faxed to office for completion      Callback required yes/no and why: Yes, to verify her form was received in the office      Best contact number(s): [phone]      Details to clarify the request: Pt is requesting a call back from the office to let her know if the form for her to return to work was received and completed and is ready for her to . Please advise.       Dennys Barragan

## 2021-06-18 NOTE — TELEPHONE ENCOUNTER
Left message that I have the forms and will give to Dr. Brown Click upon his return to the office and will call her once complete

## 2021-06-23 ENCOUNTER — TELEPHONE (OUTPATIENT)
Dept: NEUROLOGY | Age: 36
End: 2021-06-23

## 2021-06-23 NOTE — TELEPHONE ENCOUNTER
----- Message from Pema Harrison sent at 6/23/2021 12:56 PM EDT -----  Regarding: Dr. Jase Little first and last name and relationship (if not the patient): N/A  Best contact number(s): [phone]  Whose call is being returned: Dr. Tessy Elkins  Details to clarify the request: Pt stated that she missed a phone call and she is returning the call. Pt stated that its about return to work forms that needs to be filled out. Pt needs a call back as soon as possible.

## 2021-06-23 NOTE — TELEPHONE ENCOUNTER
I called the patient and she is still doing therapy and will until August 10 so we will have her go back to work August 16 presumably back to normal work load at this time.

## 2021-06-24 ENCOUNTER — TELEPHONE (OUTPATIENT)
Dept: NEUROLOGY | Age: 36
End: 2021-06-24

## 2021-06-24 NOTE — TELEPHONE ENCOUNTER
----- Message from Yolis Hill sent at 6/24/2021  3:37 PM EDT -----  Regarding: Dr. Marylee Raja  General Message/Vendor Calls    Caller's first and last name: Pt      Reason for call: paperwork       Callback required yes/no and why: Yes, please reach out to patient to notify her of status      Best contact number(s): [phone]      Details to clarify the request: Patient is calling to see if her return to work forms are completed. She received a call from Dr. Judi Salguero on 06/23, but hasn't been given the status of their completion. Please advise.        Yolis Hill

## 2021-08-02 ENCOUNTER — VIRTUAL VISIT (OUTPATIENT)
Dept: NEUROLOGY | Age: 36
End: 2021-08-02
Payer: COMMERCIAL

## 2021-08-02 DIAGNOSIS — H46.9 LEFT OPTIC NEURITIS: ICD-10-CM

## 2021-08-02 DIAGNOSIS — Z51.81 THERAPEUTIC DRUG MONITORING: ICD-10-CM

## 2021-08-02 DIAGNOSIS — H46.9 OPTIC NEUROPATHY, LEFT: ICD-10-CM

## 2021-08-02 DIAGNOSIS — G35 MS (MULTIPLE SCLEROSIS) (HCC): Primary | ICD-10-CM

## 2021-08-02 DIAGNOSIS — Z86.69 HISTORY OF OPTIC NEURITIS: ICD-10-CM

## 2021-08-02 DIAGNOSIS — N39.0 CHRONIC UTI: ICD-10-CM

## 2021-08-02 DIAGNOSIS — R41.844 FRONTAL LOBE AND EXECUTIVE FUNCTION DEFICIT: ICD-10-CM

## 2021-08-02 PROCEDURE — 99215 OFFICE O/P EST HI 40 MIN: CPT | Performed by: PSYCHIATRY & NEUROLOGY

## 2021-08-02 NOTE — PATIENT INSTRUCTIONS
As per discussion  I agree with you trying to get a closer facility to reduce your driving especially in the face of having some sleep deprivation due to the baby in the house and lifestyle changes    Per our discussion you felt there was no further paperwork that needs to be done to return to work at this time    I need you to get blood work for me is therapeutic monitoring on the [place]  Blood work slip should be included in this packet please get it completed at a local [facility]     Keep your appointment with [facility] November you can call them to see if they can move it up sooner  Contact number is [phone]  But you need to get established at [facility] I have them as your primary provider due to logistics    We will tentatively set you up in October if you get in with [facility] sooner then you can cancel this appointment    Office Policies    o Phone calls/patient messages:  Please allow up to 24 hours for someone in the office to contact you about your call or message. Be mindful your provider may be out of the office or your message may require further review. We encourage you to use [facility] for your messages as this is a faster, more efficient way to communicate with our office    o Medication Refills:  Prescription medications require up to 48 business hours to process. We encourage you to use [facility] for your refills. For controlled medications: Please allow up to 72 business hours to process. Certain medications may require you to  a written prescription at our office. NO narcotic/controlled medications will be prescribed after 4pm Monday through Friday or on weekends    o Form/Paperwork Completion:  We ask that you allow 7-14 business days. You may also download your forms to [facility] to have your doctor print off.

## 2021-08-02 NOTE — PROGRESS NOTES
[address] 84  TELEHEALTH Virtual FOLLOW-UP VISIT        Janet Tripp is a 29 y.o. female who was seen by synchronous (real-time) audio-video technology on 8/2/2021. Janet Tripp is a 29 y.o. female who presents today for the following:  Chief Complaint   Patient presents with    Follow-up     trying to make sure she is ok to go back to work and went to pt for numbness on left side of the body.  Multiple Sclerosis         ASSESSMENT AND PLAN    Multiple sclerosis  Currently on Aubagio x 1month  MRIs from March 2021 are without change  Clinically stable  Prescreening blood work for [employer] ordered back in January is still pending QuantiFERON gold and other assays have never been completed  At this point she is already on the Aubagio I have recommended continuing that presently it is a risk we will take at this time because we cannot risk flareup    Patient remains out of work presently and is due to return to work August 16, 2021 per pt report  She reports trying to get transferred to [facility] which is only 15 minutes from her house versus an hour commute   I certainly support that due to neurologic medical condition and history of prior accidents    At this time the patient reports no further paperwork needs to be done  She does have an appointment at [facility] in November  We will tentatively set up an appointment here in October to check in to see how she is doing with her return to work when she gets established with the [facility] network she will be followed there long-term, no longer followed in our practice  Logistically it does not make sense for her to be followed here in our practice   Patient agrees with the plan       Cognitive impairment  Improved  Patient being more mindful  Can consider neuropsych testing but for now that will be deferred    Issues related to chronic UTIs have resolved  Anemia:  This is resolved as well  Being followed at [facility] for both of the above issues          ICD-10-CM ICD-9-CM    1. MS (multiple sclerosis) ([facility] [address]  G35 340    2. History of optic neuritis  Z86.69 V12.49    3. Chronic UTI  N39.0 599.0    4. Left optic neuritis  H46.9 377.30    5. Frontal lobe and executive function deficit  R41.844 799.55    6. Optic neuropathy, left - recurrent  H46.9 377.39          I attest that 40 minutes was spent on today's visit reviewing medical records and diagnostic testing deemed pertinent to this patient's care, along with direct time spent at patient's visit including the history, physical assessment and plan, discussing diagnosis and management along with documentation.       HPI  Historical Data  Historical Data patient patient is known to the practice and was previously seen by Dr. Cayden Barr        Neurologic diagnosis  Multiple sclerosis: Diagnosed 2011 presented with left optic neuritis  MRI with moderate to severe lesion load in the head  MRI of the cervical spin with evidence of demyelinating lesions  Spinal tap completed in 2011 showed elevated IgG synthesis oligoclonal banding and myelin basic protein elevation  Vitamin D deficiency     Prior DMT:   [facility]  Tecfidera     History of optic neuritis left eye     Pertinent diagnostic testing  MRI scans from August 2020 were stable without change     Pertinent comorbid conditions and issues  Severe anemia during pregnancy require iron transfusions  Recurrent severe UTI during pregnancy and postpartum being followed at [facility]        Interim Data:   MS Celeste  x1 month  Denies SE  Never completed the prescreening paperwork for QuantiFERON gold    Left optic neuritis   History of blurred vision   No complaints at today's office visit     Having recurrent UTIs since September 2020  Usually Pseudomonas  Currently being followed by [facility] has been on antibiotics x5 different courses  To date is now resolved    Left side of body: tingling    Cognition  Patient states she is now taking things much more slowly and is more focused and less distracted and doing better  She still somewhat sleep deprived relative to the new baby in the household which also can be playing into this           Results from [facility] encounter on 03/07/21    MRI CERV SPINE W WO CONT    Impression  Stable examination. Cervical cord involvement is unchanged. MRI [facility] SPINE W WO CONT    Impression  1. Stable distal spinal cord lesion compatible with the known demyelination. 2. No extrinsic disease or new findings. MRI BRAIN W WO CONT    Impression  1. No acute findings no mass or enhancing lesion. 2. Stable burden of white matter disease and atrophy. Allergies   Allergen Reactions    Sulfa (Sulfonamide Antibiotics) Hives       Current Outpatient Medications   Medication Sig    hydrOXYzine HCL (ATARAX) 25 mg tablet     medroxyPROGESTERone (DEPO-PROVERA) 150 mg/mL injection 150 mg by IntraMUSCular route.  teriflunomide (AUBAGIO) 14 mg tablet Take 1 Tab by mouth daily. Indications: relapsing form of multiple sclerosis    sertraline (ZOLOFT) 25 mg tablet TAKE 1 TABLET BY MOUTH EVERY DAY    acetaminophen (TylenoL) 325 mg tablet Take  by mouth every four (4) hours as needed for Pain.  tiZANidine (ZANAFLEX) 2 mg tablet Take 1 Tab by mouth three (3) times daily as needed for Muscle Spasm(s).  albuterol (PROVENTIL HFA, VENTOLIN HFA, PROAIR HFA) 90 mcg/actuation inhaler albuterol (refill) 90 mcg/actuation aerosol inhaler   Inhale 1 puff every 4 hours by inhalation route.  cholecalciferol, vitamin D3, (VITAMIN D3) 2,000 unit tab Take  by mouth.  b complex vitamins (B COMPLEX 1) tablet Take 1 Tab by mouth daily.  ibuprofen (MOTRIN) 800 mg tablet Take 1 Tab by mouth every eight (8) hours as needed for Pain.  fluticasone propionate (Flonase) 50 mcg/actuation nasal spray 2 Sprays by Nasal route daily. nostril. No current facility-administered medications for this visit.         Past medical history/surgical history, family history, and social history have been reviewed for today's visit      ROS    A ten system review of constitutional, cardiovascular, respiratory, musculoskeletal, endocrine, skin, SHEENT, genitourinary, psychiatric and neurologic systems was obtained and is unremarkable except as mentioned under HPI          EXAMINATION: Within the context of virtual telehealth visit:    General appearance: Patient is well-developed and well-nourished in no apparent distress and well groomed. Psych/mental health:  Affect: Appropriate    PHQ  3 most recent PHQ Screens 8/2/2021   Little interest or pleasure in doing things Not at all   Feeling down, depressed, irritable, or hopeless Not at all   Total Score PHQ 2 0       HEENT:   Normocephalic  With evidence of trauma: No  Full range of motion head neck: Yes  Tenderness to palpation of the head neck region: N/A      Cardiovascular:     Extremities warm to touch: N/A  Extremity swelling: No  Discoloration: No  Evidence of PVD: No    Respiratory:   Dyspnea on exertion: No   Abnormal effort on casual observation: No   Use of portable oxygen: No   Evidence of cyanosis: No     Musculoskeletal:   Evidence of significant bone deformities: No   Spinal curvature: No     Integumentary:    Obvious bruising: No   Lacerations or discoloration on casual observation: No       Neurological Examination:   Mental Status:        MMSE  No flowsheet data found. Formal testing was not completed    there was nothing concerning on general observation and discussion.    Alert oriented and appropriate to general conversation  Normal processing on general observation  Followed conversation and responded seemingly appropriate throughout the office visit  No word finding difficulties noted on casual observation  Able to follow directions without difficulty     Cranial Nerves:    Grossly intact    Motor:   Normal bulk  No tremor appreciated on today's exam  No abnormal movements appreciated on today's exam  Moves extremities spontaneously and with purpose      Sensation: Not tested    Coordination/Cerebellar:   Grossly intact    Gait: Not tested    Fall risk assessment  Fall Risk Assessment, last 12 mths 3/25/2014   Able to walk? Yes   Fall in past 12 months? No           Due to this being a TeleHealth evaluation, many elements of the physical examination are unable to be assessed. Pursuant to the emergency declaration under the [address] [place] [address] waiver authority and the [employer] Act, this Virtual  Visit was conducted, with patient's consent, to reduce the patient's risk of exposure to COVID-19 and provide continuity of care for an established patient. Services were provided through a video synchronous discussion virtually to substitute for in-person clinic visit.           Robin Lang MS, ANP-BC, [facility]

## 2021-09-01 ENCOUNTER — VIRTUAL VISIT (OUTPATIENT)
Dept: NEUROLOGY | Age: 36
End: 2021-09-01
Payer: COMMERCIAL

## 2021-09-01 DIAGNOSIS — H46.9 LEFT OPTIC NEURITIS: ICD-10-CM

## 2021-09-01 DIAGNOSIS — G35 MULTIPLE SCLEROSIS, RELAPSING-REMITTING (HCC): Primary | ICD-10-CM

## 2021-09-01 PROCEDURE — 99214 OFFICE O/P EST MOD 30 MIN: CPT | Performed by: PSYCHIATRY & NEUROLOGY

## 2021-09-01 RX ORDER — TIZANIDINE 2 MG/1
2 TABLET ORAL
Qty: 100 TABLET | Refills: 11 | Status: SHIPPED | OUTPATIENT
Start: 2021-09-01 | End: 2022-09-27 | Stop reason: SDUPTHER

## 2021-09-01 NOTE — PROGRESS NOTES
Neurology Progress Note    Patient ID:  Vicente Sanches  [id number]  29 y.o.  1985      CHIEF COMPLAINT: Blurred vision in the left eye    Subjective:      Patient is a 46-year-old right-handed female seen today on urgent work in basis at the request of Dr. Jeanna Fields for evaluation of new problem of patient having some intermittent blurred vision that comes and goes, but does not really last that long and more likely is just a minor fluctuation in her optic neuritis, and she has difficulty at times listing off to either side when walking, but is working on that with physical therapy which she is still getting around 2 times a week depending on her work schedule. She has been able to go back to work and seems to be tolerating that well. She is not had any new clear exacerbation or worsening of her MS and her last MRI scans done in March 2020 one of the cervical spine and brain showed stable disease. She is on Aubagio and tolerating it well. [facility] will [facility] her in January, so she scheduled this visit with us at her regular 6-month follow-up to make sure she was okay. Patient seen in April by me at her last visit. The patient was seen in a virtual visit by nurse practitioner Marta Tyler in February 2021 and had an MRI of the brain and cervical spine ordered and the thoracic spine, both with and without contrast for all 3, and they all showed stable disease without any new lesions.   So I think what she is feeling is more pseudoexacerbations from her recent urinary tract infection which was severe, in the postpartum period and generalized weakness, and she needs to get on a disease modifying drug, and agrees to try Aubagio after this was discussed with her by the nurse practitioner for in February, so a starter form was filled out today, and she was advised that she needs a blood test every month for the next 6 months, and they were sent to her also, and she is going to print off her lab test from [facility] where she was seen for her UTI and pregnancy, to make sure that her lab tests are stable before we start the Aubagio. She just had the recent MRI so we do not need that yet. We will place her in physical therapy and told her that she may need short-term disability for about 3 months to try to build up her strength and balance and coordination. She still has some blurred vision in her eyes, but is not quite as bad as it was 2 months ago. She was previously on Tecfidera but had a new lesion on her MRI scan was being switched to Aubagio. Patient has not been seen in over a year, and had the pregnancy with worsening neurologic status as above. Patient has tolerated the medication well without side effect, has no GI side effects, no diarrhea, no flushing, and no unusual infections or other problems. We reviewed her MRI scans all personally on the PACS system and I agree with report as dictated and discussed that with the patient and her  in detail. Her metabolic parameters and hematologic parameters were also checked to make sure that she is safe with the medication that have marked leukopenia. 3 years ago she has had an episode of optic neuritis in the left eye, and was given IV steroids with some improvement, and her visual acuity is back to 2020 with handcard and near vision today. She is being switched to Aubagio because she had a new lesion last year MRI. She is to get back on her medications including vitamin D and multivitamins and continue her muscle relaxers as needed  She has a visual acuity of 20/20, but has a subjective blurring and slight decreased color desaturation in the left eye as compared to the right at last visit. Patient had an abnormal MRI scan of the brain and the cervical spine showing demyelinating lesions some of which were enhancing in both the brain and the cervical spine last year.   The patient had a previous workup in 2011 with abnormal spinal fluid suggesting demyelinating disease with elevated IgG synthesis rate, oligoclonal bands and myelin basic protein, and an abnormal MRI of the brain and cervical spine then, but she never returned for follow-up. She also had a previous episode in 2005 when I saw her 12 years ago, at that time her workup was negative for any signs of MS. She is better, and able to walk without assistive devices, and has gone back to work and no longer on disability. 35 minutes spent with the patient and her  in the office today, going over all the different medications, giving her information to read on them, advising her of the risks, benefits and side effects of all the different medications. Past Medical History:   Diagnosis Date    37 weeks gestation of pregnancy 12/2/2020    Added automatically from request for surgery [id number]    Anemia affecting pregnancy 9/3/2020    Diagnosed 9/2/2020 : H&H 9.6/31 , Ferritin 13 Symptoms: none  If ferritin is <30, then:  --Second trimester Hgb <10.5 g/dL:  --IV iron  PLAN: - messaged pt about starting IV iron on 09/03/20 - patient scheduled at infusion center on 10/5/20 Darleen ] recheck CBC 11/ 2020 - Hgb 10.9, ferritin 365, discontinued oral iron due to high ferritin. Last Assessment & Plan:  11/12 Hgg 10.9, ferritin 365, disc    Asthma     Ectopic pregnancy 2/14/2019    Gastrointestinal disorder     reflux    GERD (gastroesophageal reflux disease)     Multiple sclerosis (HCC)     Multiple sclerosis affecting pregnancy ([facility] [address] 6/25/2020    Was taking Tecfidera before pregnancy, but has since stopped it. Only MS symptoms are vision loss in her left eye. Last attack was in 2017. --Discussed that pregnancy is associated with a significant decrease in MS disease activity and postpartum period is associated with an increase in MS activity.   --Acute MS attacks (relapses) during pregnancy can be treated with intravenous glucocorticoids, w    Multiple sclerosis affecting pregnancy ([address] 6/25/2020    Was taking Tecfidera before pregnancy, but has since stopped it. Only MS symptoms are vision loss in her left eye. Last attack was in 2017. --Discussed that pregnancy is associated with a significant decrease in MS disease activity and postpartum period is associated with an increase in MS activity. --Acute MS attacks (relapses) during pregnancy can be treated with intravenous glucocorticoids, w    Post-op pain 2/14/2019    Supervision of normal pregnancy 6/25/2020    Dated by LMP Genetic screening: declined Immunization: TDaP at 28 weeks, influenza **, VZV immune (?) rubella immune Rhogam: Not indicated, patient O pos 1hr GTT at 28 weeks GBS at 35 weeks Breastfeeding: Plans to breast feed PP Contraception: need to address Total weight gain: 6 lb 9.6 oz (2.994 kg)  Needs urine NING.  Supervision of normal pregnancy 6/25/2020    Dated by LMP Genetic screening: declined Immunization: TDaP at 28 weeks, influenza **, VZV immune (?) rubella immune Rhogam: Not indicated, patient O pos 1hr GTT at 28 weeks GBS at 35 weeks Breastfeeding: Plans to breast feed PP Contraception: need to address Total weight gain: 6 lb 9.6 oz (2.994 kg)  Needs urine NING. Dated by LMP Genetic screening: declined Immunization: TDaP at 28 weeks 10/1, i    UTI (urinary tract infection) in pregnancy, antepartum 9/3/2020    Initial visit:E.coli UTI diagnosed at initial visit. Treated with Keflex. NING Urine Sample collected on 9/2 for culture to ensure clearance of UTI - UTI cleared 10/1: denies symptoms 10/12: Found to have pseudomonas UTI, treated with 2g of IM cefepime q12h for 6 doses. Completed on 10/18/2020. 10/29: Urine culture ordered for test of cure, still positive for Pseudomonas.  11/12: Patient s/p Fosfomy       Past Surgical History:   Procedure Laterality Date    HX GYN  10/2013    c section    HX HEENT      tonsils, adenoids, PE tubes       [unfilled]    Social History     Tobacco Use    Smoking status: Never Smoker    Smokeless tobacco: Never Used Substance Use Topics    Alcohol use: Not Currently           Allergies   Allergen Reactions    Sulfa (Sulfonamide Antibiotics) Hives       Review of Systems:    A comprehensive review of systems was negative except for: Constitutional: positive for fatigue and malaise  Eyes: positive for visual disturbance  Neurological: positive for coordination problems, gait problems, weakness and Visual loss    Objective:      Objective:     @IPVITALS(24:)@      Lab Review No results found for this or any previous visit (from the past 24 hour(s)). Additional comments:I personally viewed and interpreted the patient's MRI scans reviewed personally on the PACS system today, and they were shown to the patient        NEUROLOGICAL EXAM:    Appearance: The patient is well developed, well nourished, provides a coherent history and is in no acute distress. Mental Status: Oriented to time, place and person and the president, cognitive function and fund of knowledge is normal. Speech is fluent without aphasia or dysarthria. Mood and affect appropriate. Cranial Nerves:   Intact visual fields. Fundi are positive and they show optic pallor bilaterally left greater than right at last visit. No Voncile Gayer pupil present at last visit. Visual acuity is 20/20 in the left eye missing only 1 number with near vision, and 20/20 in the right eye at last visit. CLEMENTE, EOM's full, no nystagmus, no ptosis. Facial sensation is normal. Corneal reflexes are not tested. Facial movement is symmetric. Hearing is normal bilaterally. Palate is midline with normal sternocleidomastoid and trapezius muscles are normal. Tongue is midline. Neck without meningismus or bruits  Temporal arteries are not tender or enlarged    Motor:  4/5 strength in upper and lower proximal and distal muscles. Normal bulk and tone. No fasciculations.   Patient has percussion tenderness over the lumbar spine, and tenderness over the paraspinal muscles which are tight and sore  Rapid alternating movement is a little slow but symmetric bilaterally   Reflexes:   Deep tendon reflexes 2+/4 and symmetrical in the upper extremities, and 3+/4 in the lower extremities bilaterally and  No babinski or clonus present at last visit   Sensory:   Normal to touch, pinprick and temperature and vibration and temperature and DSS is intact at last visit   Gait:  Abnormal gait with patient moving slowly due to spastic mildly paraplegic gait with ataxia. Finger-nose-finger examination shows no major dysmetria   Tremor:   No tremor noted. Cerebellar:   Mildly abnormal Romberg and tandem cerebellar signs present. Neurovascular:  Normal heart sounds and regular rhythm, peripheral pulses intact, and no carotid bruits at last visit. Assessment:        Assessment:       ICD-10-CM ICD-9-CM    1. Multiple sclerosis, relapsing-remitting (HCC)  G35 340 tiZANidine (ZANAFLEX) 2 mg tablet   2. Left optic neuritis  H46.9 377.30 tiZANidine (ZANAFLEX) 2 mg tablet         Plan:     Patient seen for new problem of needing her tizanidine refilled for her muscle spasms in her lower lumbar spine, and to evaluate her neurologic status which actually seems to be stable with her symptoms of mild ataxia and visual blurring probably just related to her old MS disease and fluctuations from fatigue and heat and not really an exacerbation. Her last MRI scans of the brain and the spine in March 2021 showed no new lesions. She is doing well on the Aubagio without side effects, we advised her to continue that medication at this time. She will follow up with [facility] in January we will arrange a 6-month follow-up just in case that falls through. Patient will be started on Aubagio and she is tolerating the medicine well without side effects and her blood tests done by her PCP seem to be stable. She has had no relapses and no exacerbations on the medication so far.   We will repeat her MRI scan at one years time.  Patient will continue physical therapy for gait training strengthening and balance training to try to help her recover enough to get back to work. She will continue to get lab test every 3-6 month as we advised her, and she was advised of the GI side effects, narrow angle glaucoma, hair loss, white count compression and liver function elevation also be monitored carefully, if she gets a rash she is to stop the medication immediately. Patient will call if any problem in view of her test results on my chart  We discussed her condition with her and her family also today   32 minutes met with the patient going over all of this with her in detail and making sure she is stable back at work and not having any new MS symptoms. Follow-up in 3 months time as arranged. José Horn was seen by synchronous (real-time) audio-video technology on 09/01/21. Consent:  She and/or her healthcare decision maker is aware that this patient-initiated Telehealth encounter is a billable service, with coverage as determined by her insurance carrier. She is aware that she may receive a bill and has provided verbal consent to proceed: Yes    I was in the office while conducting this encounter. Pursuant to the emergency declaration under the [facility], [facility] waiver authority and the [employer] Act, this Virtual  Visit was conducted, with patient's consent, to reduce the patient's risk of exposure to COVID-19 and provide continuity of care for an established patient. Services were provided through a video synchronous discussion virtually to substitute for in-person clinic visit.   Signed:  Soledad Navarro MD  9/1/2021  8:51 PM    Shahzad Haynes MD  [phone]

## 2021-10-26 ENCOUNTER — TELEPHONE (OUTPATIENT)
Dept: NEUROLOGY | Age: 36
End: 2021-10-26

## 2021-10-26 NOTE — TELEPHONE ENCOUNTER
----- Message from April Rabb sent at 10/26/2021 10:39 AM EDT -----  Regarding: /Telephone  General Message/Vendor Calls    Caller's first and last name: N/A      Reason for call: Pt has some questions/concerns about her medication       Callback required yes/no and why: yes      Best contact number(s): [phone]      Details to clarify the request:  Pt has some questions/concerns about her medication         April Rabb

## 2021-10-28 NOTE — TELEPHONE ENCOUNTER
I called the patient. Her  has been concerned about her health. Since starting the Aubagio in July they feel she has had a few problems. She has had 2 blurry episodes in her left eye, gait off every now and then (she thinks she is walking straight but her  says no.), and had an episode where her left side went numb and Dr. Joann Mehta had her go to PT. Her  would like to know Dr. Bailey Sauceda thoughts on her going to [facility].  They also like that it is once a month instead of an everyday pill

## 2021-10-29 NOTE — TELEPHONE ENCOUNTER
Osmin Fenton, then she needs to schedule an office visit to go over the risk and benefits of that immunosuppression drug and signed the starter form so she needs an in office visit, and you could see if you could work her in may be that Friday, November 5

## 2021-11-05 ENCOUNTER — OFFICE VISIT (OUTPATIENT)
Dept: NEUROLOGY | Age: 36
End: 2021-11-05
Payer: COMMERCIAL

## 2021-11-05 VITALS
TEMPERATURE: 97.6 F | HEART RATE: 77 BPM | WEIGHT: 117 LBS | OXYGEN SATURATION: 100 % | SYSTOLIC BLOOD PRESSURE: 100 MMHG | DIASTOLIC BLOOD PRESSURE: 60 MMHG | BODY MASS INDEX: 19.97 KG/M2 | HEIGHT: 64 IN

## 2021-11-05 DIAGNOSIS — G35 MULTIPLE SCLEROSIS, RELAPSING-REMITTING (HCC): Primary | ICD-10-CM

## 2021-11-05 DIAGNOSIS — H46.9 LEFT OPTIC NEURITIS: ICD-10-CM

## 2021-11-05 PROCEDURE — 99214 OFFICE O/P EST MOD 30 MIN: CPT | Performed by: PSYCHIATRY & NEUROLOGY

## 2021-11-05 RX ORDER — IBUPROFEN 800 MG/1
800 TABLET ORAL
Qty: 100 TABLET | Refills: 11
Start: 2021-11-05

## 2021-11-05 NOTE — PROGRESS NOTES
Neurology Progress Note    Patient ID:  Dolores Pate  [id number]  29 y.o.  1985      CHIEF COMPLAINT: Blurred vision in the left eye    Subjective:      Patient is a 36-year-old right-handed female seen today on urgent work in basis at the request of Dr. Asher Fan for evaluation of new problem of patient having some intermittent blurred vision in her left eye that comes and goes, and she had 2 recent episodes that lasted about a week each, and also feels that she tends to lean to the left has loss of balance may be some left-sided weakness and numbness and seems to be getting worse again, and wants to possibly consider the monthly injection of Beatriz Jada, because she is on Aubagio and that medication did better than Aubagio and the double-blind study that was head-to-head. She did have some the same symptoms after her pregnancy this year, and was started on Aubagio for that, but her MRI of the cervical spine and brain actually showed no new lesions. MRIs were done in April 2021. We went over the risk and benefits of new medication, and told her that she might be a candidate for if she does have new lesions, and she was given information to read on it, and after her test they do show progression of disease and she is interested in the medication she certainly could be a candidate for. She signed the starter form and we will send that in once we make a final decision. Patient was initially going to go to [facility] but apparently has decided to stay with us. Patient has lost her job because she had GI side effects and missed a lot of work and they fired her. The patient was seen in a virtual visit by nurse practitioner Berna Oliver in February 2021 and had an MRI of the brain and cervical spine ordered and the thoracic spine, both with and without contrast for all 3, and they all showed stable disease without any new lesions.   She was previously on Tecfidera but had a new lesion on her MRI scan was being switched to Aubagisulma. We reviewed her MRI scans all personally on the PACS system and I agree with report as dictated and discussed that with the patient and her  in detail. Her metabolic parameters and hematologic parameters were also checked to make sure that she is safe with the medication and her immunoelectrophoresis, chronic urine gold, hepatitis B panel were also all checked in case she does decide to go on the medication. .  3 years ago she has had an episode of optic neuritis in the left eye, and was given IV steroids with some improvement, and her visual acuity is back to 2020 with handcard and near vision today. She was switched to [facility] because she had a new lesion last year MRI. She is to get back on her medications including vitamin D and multivitamins and continue her muscle relaxers as needed  She has a visual acuity of 20/20, but has a subjective blurring and slight decreased color desaturation in the left eye as compared to the right at last visit. Patient had an abnormal MRI scan of the brain and the cervical spine showing demyelinating lesions some of which were enhancing in both the brain and the cervical spine last year. The patient had a previous workup in 2011 with abnormal spinal fluid suggesting demyelinating disease with elevated IgG synthesis rate, oligoclonal bands and myelin basic protein, and an abnormal MRI of the brain and cervical spine then, but she never returned for follow-up. She also had a previous episode in 2005 when I saw her 12 years ago, at that time her workup was negative for any signs of MS. She is better, and able to walk without assistive devices, and has gone back to work and no longer on disability. 45 minutes spent with the patient and her  in the office today, going over all the different medications, giving her information to read on them, advising her of the risks, benefits and side effects of all the different medications.          Past Medical History:   Diagnosis Date    37 weeks gestation of pregnancy 12/2/2020    Added automatically from request for surgery [id number]    Anemia affecting pregnancy 9/3/2020    Diagnosed 9/2/2020 : H&H 9.6/31 , Ferritin 13 Symptoms: none  If ferritin is <30, then:  --Second trimester Hgb <10.5 g/dL:  --IV iron  PLAN: - messaged pt about starting IV iron on 09/03/20 - [facility] 11/ 2020 - Hgb 10.9, ferritin 365, discontinued oral iron due to high ferritin. Last Assessment & Plan:  11/12 Hgg 10.9, ferritin 365, disc    Asthma     Ectopic pregnancy 2/14/2019    Gastrointestinal disorder     reflux    GERD (gastroesophageal reflux disease)     Multiple sclerosis (HCC)     Multiple sclerosis affecting pregnancy ([facility] [address] 6/25/2020    Was taking Tecfidera before pregnancy, but has since stopped it. Only MS symptoms are vision loss in her left eye. Last attack was in 2017. --Discussed that pregnancy is associated with a significant decrease in MS disease activity and postpartum period is associated with an increase in MS activity. --Acute MS attacks (relapses) during pregnancy can be treated with intravenous glucocorticoids, w    Multiple sclerosis affecting pregnancy ([facility] [address] 6/25/2020    Was taking Tecfidera before pregnancy, but has since stopped it. Only MS symptoms are vision loss in her left eye. Last attack was in 2017. --Discussed that pregnancy is associated with a significant decrease in MS disease activity and postpartum period is associated with an increase in MS activity.   --Acute MS attacks (relapses) during pregnancy can be treated with intravenous glucocorticoids, w    Post-op pain 2/14/2019    Supervision of normal pregnancy 6/25/2020    Dated by LMP Genetic screening: declined Immunization: TDaP at 28 weeks, influenza **, VZV immune (?) rubella immune Rhogam: Not indicated, patient O pos 1hr GTT at 28 weeks GBS at 35 weeks Breastfeeding: Plans to breast feed PP Contraception: need to address Total weight gain: 6 lb 9.6 oz (2.994 kg)  Needs urine NING.  Supervision of normal pregnancy 6/25/2020    Dated by LMP Genetic screening: declined Immunization: TDaP at 28 weeks, influenza **, VZV immune (?) rubella immune Rhogam: Not indicated, patient O pos 1hr GTT at 28 weeks GBS at 35 weeks Breastfeeding: Plans to breast feed PP Contraception: need to address Total weight gain: 6 lb 9.6 oz (2.994 kg)  Needs urine NING. Dated by LMP Genetic screening: declined Immunization: TDaP at 28 weeks 10/1, i    UTI (urinary tract infection) in pregnancy, antepartum 9/3/2020    Initial visit:E.coli UTI diagnosed at initial visit. Treated with Keflex. NING Urine Sample collected on 9/2 for culture to ensure clearance of UTI - UTI cleared 10/1: denies symptoms 10/12: Found to have pseudomonas UTI, treated with 2g of IM cefepime q12h for 6 doses. Completed on 10/18/2020. 10/29: Urine culture ordered for test of cure, still positive for Pseudomonas. 11/12: Patient s/p Fosfomy       Past Surgical History:   Procedure Laterality Date    HX GYN  10/2013    c section    HX HEENT      tonsils, adenoids, PE tubes       [unfilled]    Social History     Tobacco Use    Smoking status: Never Smoker    Smokeless tobacco: Never Used   Substance Use Topics    Alcohol use: Not Currently           Allergies   Allergen Reactions    Sulfa (Sulfonamide Antibiotics) Hives       Review of Systems:    A comprehensive review of systems was negative except for: Constitutional: positive for fatigue and malaise  Eyes: positive for visual disturbance  Neurological: positive for coordination problems, gait problems, weakness and Visual loss    Objective:      Objective:     @IPVITALS(24:)@      Lab Review No results found for this or any previous visit (from the past 24 hour(s)).         Additional comments:I personally viewed and interpreted the patient's MRI scans reviewed personally on the PACS system today, and they were shown to the patient        NEUROLOGICAL EXAM:    Appearance: The patient is well developed, well nourished, provides a coherent history and is in no acute distress. Mental Status: Oriented to time, place and person and the president, cognitive function and fund of knowledge is normal. Speech is fluent without aphasia or dysarthria. Mood and affect appropriate. Cranial Nerves:   Intact visual fields. Fundi are positive and they show optic pallor bilaterally left greater than right at last visit. No Page Route pupil present at last visit. Visual acuity is 20/20 in the left eye missing only 1 number with near vision, and 20/20 in the right eye at last visit. CLEMENTE, EOM's full, no nystagmus, no ptosis. Facial sensation is normal. Corneal reflexes are not tested. Facial movement is symmetric. Hearing is normal bilaterally. Palate is midline with normal sternocleidomastoid and trapezius muscles are normal. Tongue is midline. Neck without meningismus or bruits  Temporal arteries are not tender or enlarged    Motor:  4/5 strength in upper and lower proximal and distal muscles. Normal bulk and tone. No fasciculations. Patient has percussion tenderness over the lumbar spine, and tenderness over the paraspinal muscles which are tight and sore  Rapid alternating movement is a little slow but symmetric bilaterally   Reflexes:   Deep tendon reflexes 2+/4 and symmetrical in the upper extremities, and 3+/4 in the lower extremities bilaterally and  No babinski or clonus present at last visit   Sensory:   Normal to touch, pinprick and temperature and vibration and temperature and DSS is intact at last visit   Gait:  Abnormal gait with patient moving slowly due to spastic mildly paraplegic gait with ataxia. Patient seems to favor the left leg. Finger-nose-finger examination shows no major dysmetria   Tremor:   No tremor noted.    Cerebellar:   Mildly abnormal Romberg and tandem cerebellar signs present. Patient does seem to have a tendency to lift to the left   Neurovascular:  Normal heart sounds and regular rhythm, peripheral pulses intact, and no carotid bruits at last visit. Assessment:        Assessment:       ICD-10-CM ICD-9-CM    1. Multiple sclerosis, relapsing-remitting (HCC)  G35 340 CBC WITH AUTOMATED DIFF      METABOLIC PANEL, COMPREHENSIVE      ESPERANZA VIRUS DNA BY PCR, QL      VIELKA COMPREHENSIVE PLUS PANEL      HEPATITIS B SURF AB QUANT      QUANTIFERON-TB GOLD PLUS      IMMUNOELECTROPHORESIS (IMMUNOFIX.)      ibuprofen (MOTRIN) 800 mg tablet      MRI BRAIN W WO CONT      MRI CERV SPINE W WO CONT   2. Left optic neuritis  H46.9 377.30 CBC WITH AUTOMATED DIFF      METABOLIC PANEL, COMPREHENSIVE      ESPERANZA VIRUS DNA BY PCR, QL      VIELKA COMPREHENSIVE PLUS PANEL      HEPATITIS B SURF AB QUANT      QUANTIFERON-TB GOLD PLUS      IMMUNOELECTROPHORESIS (IMMUNOFIX.)      ibuprofen (MOTRIN) 800 mg tablet      MRI BRAIN W WO CONT      MRI CERV SPINE W WO CONT         Plan:     Patient seen for new problem of needing her medications possibly change because she may be having relapses, but she has some of the same complaints last spring and her work-up showed no new lesions at all, so would like to make sure she is having clear exacerbations before switching her to a treatment that may be more immunosuppressive and associated with more side effects. We also sent off all the blood work so that she could start a medication right away and had her sign the starter form so that she could be ready anytime. Her last MRI scans of the brain and the spine in March 2021 showed no new lesions. She is to continue her Aubagio until we finish the work-up, and continue her Zanaflex that she takes also. She is not taking her vitamins or vitamin D we strongly encourage her to get back on those also. She was initially going to be followed by [facility] but apparently she has decided to stay with us.   45 minutes met with the patient and her  on phone, going over all the medication, going over the risk and benefits of medication, and discussing all the side effects, and the efficacy all in detail, and she will check her results on my chart or call us once the MRI is done. Patient with progressive MS, mother of several children, and obviously very high risk for further neurologic deterioration dysfunction and disability with this active form of MS that she has. .  Follow-up in 3 months time as arranged.     Signed:  Everette Escobar MD  11/5/2021  8:51 PM    Gil Hitchcock MD  [phone]

## 2021-11-11 DIAGNOSIS — G35 MULTIPLE SCLEROSIS, RELAPSING-REMITTING (HCC): Primary | ICD-10-CM

## 2021-11-13 ENCOUNTER — TELEPHONE (OUTPATIENT)
Dept: NEUROLOGY | Age: 36
End: 2021-11-13

## 2021-11-13 DIAGNOSIS — G35 MULTIPLE SCLEROSIS, RELAPSING-REMITTING (HCC): Primary | ICD-10-CM

## 2021-11-13 RX ORDER — OFATUMUMAB 20 MG/.4ML
20 INJECTION, SOLUTION SUBCUTANEOUS
Qty: 3 EACH | Refills: 5
Start: 2021-11-13 | End: 2022-04-11 | Stop reason: SDUPTHER

## 2021-11-13 NOTE — TELEPHONE ENCOUNTER
Neel Founds approval from [facility]  [place] [id number] has identified approval for both loading and maintenance dose on the letter. Loading approved for one month through 12/12/21 and Maintenance dose approved for one pen per month through 11/12/22. Letter scanned to chart and sent this letter to Lan and [employer].

## 2021-11-18 ENCOUNTER — TELEPHONE (OUTPATIENT)
Dept: NEUROLOGY | Age: 36
End: 2021-11-18

## 2021-11-18 NOTE — TELEPHONE ENCOUNTER
----- Message from Sonali Ngo sent at 11/18/2021 10:50 AM EST -----  Regarding: /Telephone  General Message/Vendor Calls    Caller's first and last name: n/a       Reason for call: Patient has received medication in mail and need to speak with Dr. Charles Cervantes or Nurse before taken.       Callback required yes/no and why:  yes, to confirm      Best contact number(s): [phone]      Details to clarify the request: n/a      Sonali Ngo

## 2021-11-19 LAB
ALBUMIN SERPL-MCNC: 4.6 G/DL (ref 3.8–4.8)
ALBUMIN/GLOB SERPL: 1.6 {RATIO} (ref 1.2–2.2)
ALP SERPL-CCNC: 73 IU/L (ref 44–121)
ALT SERPL-CCNC: 9 IU/L (ref 0–32)
AST SERPL-CCNC: 13 IU/L (ref 0–40)
BASOPHILS # BLD AUTO: 0 X10E3/UL (ref 0–0.2)
BASOPHILS NFR BLD AUTO: 0 %
BILIRUB SERPL-MCNC: 0.8 MG/DL (ref 0–1.2)
BUN SERPL-MCNC: 18 MG/DL (ref 6–20)
BUN/CREAT SERPL: 18 (ref 9–23)
CALCIUM SERPL-MCNC: 9.1 MG/DL (ref 8.7–10.2)
CENTROMERE B AB SER-ACNC: <0.2 AI (ref 0–0.9)
CHLORIDE SERPL-SCNC: 106 MMOL/L (ref 96–106)
CHROMATIN AB SERPL-ACNC: <0.2 AI (ref 0–0.9)
CO2 SERPL-SCNC: 25 MMOL/L (ref 20–29)
CREAT SERPL-MCNC: 1.01 MG/DL (ref 0.57–1)
DSDNA AB SER-ACNC: <1 IU/ML (ref 0–9)
ENA JO1 AB SER-ACNC: <0.2 AI (ref 0–0.9)
ENA RNP AB SER-ACNC: 0.2 AI (ref 0–0.9)
ENA SCL70 AB SER-ACNC: <0.2 AI (ref 0–0.9)
ENA SM AB SER-ACNC: <0.2 AI (ref 0–0.9)
ENA SM+RNP AB SER-ACNC: <0.2 AI (ref 0–0.9)
ENA SS-A AB SER-ACNC: <0.2 AI (ref 0–0.9)
ENA SS-B AB SER-ACNC: 1.2 AI (ref 0–0.9)
EOSINOPHIL # BLD AUTO: 0.2 X10E3/UL (ref 0–0.4)
EOSINOPHIL NFR BLD AUTO: 3 %
ERYTHROCYTE [DISTWIDTH] IN BLOOD BY AUTOMATED COUNT: 12.3 % (ref 11.7–15.4)
GAMMA INTERFERON BACKGROUND BLD IA-ACNC: 0.02 IU/ML
GLOBULIN SER CALC-MCNC: 2.8 G/DL (ref 1.5–4.5)
GLUCOSE SERPL-MCNC: 93 MG/DL (ref 65–99)
HBV SURFACE AB SER-ACNC: <3.1 MIU/ML
HCT VFR BLD AUTO: 38.1 % (ref 34–46.6)
HGB BLD-MCNC: 12.2 G/DL (ref 11.1–15.9)
IGA SERPL-MCNC: 283 MG/DL (ref 87–352)
IGG SERPL-MCNC: 1443 MG/DL (ref 586–1602)
IGM SERPL-MCNC: 73 MG/DL (ref 26–217)
IMM GRANULOCYTES # BLD AUTO: 0 X10E3/UL (ref 0–0.1)
IMM GRANULOCYTES NFR BLD AUTO: 0 %
JCPYV DNA SPEC QL NAA+PROBE: NEGATIVE
LYMPHOCYTES # BLD AUTO: 1.6 X10E3/UL (ref 0.7–3.1)
LYMPHOCYTES NFR BLD AUTO: 20 %
M TB IFN-G BLD-IMP: NEGATIVE
M TB IFN-G CD4+ BCKGRND COR BLD-ACNC: 0.06 IU/ML
MCH RBC QN AUTO: 29.5 PG (ref 26.6–33)
MCHC RBC AUTO-ENTMCNC: 32 G/DL (ref 31.5–35.7)
MCV RBC AUTO: 92 FL (ref 79–97)
MITOGEN IGNF BLD-ACNC: >10 IU/ML
MONOCYTES # BLD AUTO: 0.8 X10E3/UL (ref 0.1–0.9)
MONOCYTES NFR BLD AUTO: 9 %
NEUTROPHILS # BLD AUTO: 5.5 X10E3/UL (ref 1.4–7)
NEUTROPHILS NFR BLD AUTO: 68 %
PLATELET # BLD AUTO: 230 X10E3/UL (ref 150–450)
POTASSIUM SERPL-SCNC: 4.2 MMOL/L (ref 3.5–5.2)
PROT PATTERN SERPL IFE-IMP: NORMAL
PROT SERPL-MCNC: 7.4 G/DL (ref 6–8.5)
QUANTIFERON INCUBATION, QF1T: NORMAL
QUANTIFERON TB2 AG: 0.04 IU/ML
RBC # BLD AUTO: 4.14 X10E6/UL (ref 3.77–5.28)
RIBOSOMAL P AB SER-ACNC: <0.2 AI (ref 0–0.9)
SEE BELOW:, 164879: ABNORMAL
SERVICE CMNT-IMP: NORMAL
SODIUM SERPL-SCNC: 142 MMOL/L (ref 134–144)
WBC # BLD AUTO: 8.1 X10E3/UL (ref 3.4–10.8)

## 2021-11-19 NOTE — TELEPHONE ENCOUNTER
I called the patient and notified her of below. She did go get the labs drawn on 11/5/2021    I called the [facility] and they the date of birth they had was an incorrect year.   [facility] will have this corrected and will fax over what they have

## 2021-11-19 NOTE — TELEPHONE ENCOUNTER
No, tell her not to start the medicine, she needs to get her MRI scans done and all her lab work that we ordered needs to be back before she starts the medicine, she needs to get the MRIs and lab work done

## 2021-11-21 ENCOUNTER — HOSPITAL ENCOUNTER (OUTPATIENT)
Dept: MRI IMAGING | Age: 36
Discharge: HOME | End: 2021-11-21
Attending: PSYCHIATRY & NEUROLOGY
Payer: COMMERCIAL

## 2021-11-21 DIAGNOSIS — G35 MULTIPLE SCLEROSIS, RELAPSING-REMITTING (HCC): ICD-10-CM

## 2021-11-21 DIAGNOSIS — H46.9 LEFT OPTIC NEURITIS: ICD-10-CM

## 2021-11-21 PROCEDURE — 70553 MRI BRAIN STEM W/O & W/DYE: CPT

## 2021-11-21 PROCEDURE — A9576 INJ PROHANCE MULTIPACK: HCPCS | Performed by: PSYCHIATRY & NEUROLOGY

## 2021-11-21 PROCEDURE — 74011250636 HC RX REV CODE- 250/636: Performed by: PSYCHIATRY & NEUROLOGY

## 2021-11-21 PROCEDURE — 72156 MRI NECK SPINE W/O & W/DYE: CPT

## 2021-11-21 RX ADMIN — GADOTERIDOL 11 ML: 279.3 INJECTION, SOLUTION INTRAVENOUS at 12:37

## 2021-11-22 ENCOUNTER — DOCUMENTATION ONLY (OUTPATIENT)
Dept: NEUROLOGY | Age: 36
End: 2021-11-22

## 2021-11-22 NOTE — PROGRESS NOTES
I called the patient, advised her the MRI scans were basically stable with MRI of the brain being negative for any new enhancing lesions, the MRI of the cervical spine showed only a very questionable area, and she will go ahead and start the medication of Author Simon since her labs are all stable also

## 2021-12-08 ENCOUNTER — TELEPHONE (OUTPATIENT)
Dept: NEUROLOGY | Age: 36
End: 2021-12-08

## 2021-12-08 NOTE — TELEPHONE ENCOUNTER
Pt needs a letter from Dr. Daniel Mcmillan stating that her  is her caregiver. Please call to discuss.  [phone]

## 2022-01-01 NOTE — TELEPHONE ENCOUNTER
Patient says she was supposed to start a new medication for her ms, an injection. She received the needles in the mail but that was it. Is she supposed to be picking up a script from her pharmacy or should that be coming in the mail too? Also, is someone going to show her how to inject? She returns to work on Monday 6/26 and would like to know what she's doing prior to that. Please call. (1) body pink, extremities blue

## 2022-02-01 ENCOUNTER — TELEPHONE (OUTPATIENT)
Dept: NEUROLOGY | Age: 37
End: 2022-02-01

## 2022-02-01 NOTE — TELEPHONE ENCOUNTER
VOICEMAIL    Pt called to say she sent something to Dr. Charly Sauceda and wants to make sure it's been received.  [phone]

## 2022-02-03 NOTE — TELEPHONE ENCOUNTER
Confirmed that paperwork was received and completed and confirmed fax number.   Faxed and received confirmed receipt

## 2022-02-04 ENCOUNTER — TELEPHONE (OUTPATIENT)
Dept: NEUROLOGY | Age: 37
End: 2022-02-04

## 2022-02-07 NOTE — TELEPHONE ENCOUNTER
Pt called to check on status of paperwork. Stated she received a call last thurs 2/3 that paperwork would be faxed but hasn't received anything.  Confirmed fax # [phone]

## 2022-02-08 ENCOUNTER — TELEPHONE (OUTPATIENT)
Dept: NEUROLOGY | Age: 37
End: 2022-02-08

## 2022-02-10 NOTE — TELEPHONE ENCOUNTER
Called and spoke with patient. Verified name/. Verified correct fax number and stated I will get this paperwork faxed over today. She verbalized understanding.      Fax number: [phone]

## 2022-02-11 NOTE — TELEPHONE ENCOUNTER
Called and spoke with patient. Verified name/. Notified I have faxed over paperwork to correct fax number. Notified handicap paperwork is placed at the  for her to . She verbalized understanding.

## 2022-03-02 ENCOUNTER — VIRTUAL VISIT (OUTPATIENT)
Dept: NEUROLOGY | Age: 37
End: 2022-03-02
Payer: MEDICAID

## 2022-03-02 DIAGNOSIS — G35 MULTIPLE SCLEROSIS, RELAPSING-REMITTING (HCC): Primary | ICD-10-CM

## 2022-03-02 DIAGNOSIS — Z51.81 THERAPEUTIC DRUG MONITORING: ICD-10-CM

## 2022-03-02 DIAGNOSIS — H46.9 LEFT OPTIC NEURITIS: ICD-10-CM

## 2022-03-02 PROCEDURE — 99214 OFFICE O/P EST MOD 30 MIN: CPT | Performed by: PSYCHIATRY & NEUROLOGY

## 2022-03-02 NOTE — PROGRESS NOTES
Neurology Progress Note    Patient ID:  Pat Luna  [id number]  89 y.o.  1985      CHIEF COMPLAINT: Blurred vision in the left eye    Subjective:      Patient is a 56-year-old right-handed female seen today on work in basis at the request of Dr. Otilia Last for evaluation of new problem of patient needing to be monitored for immunosuppressive drug [facility] which she is taking for her MS, taking subcu injections at home once a month and has had 4 to 5 injections so far and having no side effect of the medication so far, and no major flare. She has some visual blurring last November but had an MRI scan of the brain and cervical spine that was stable, showing no new lesions and no active MS activity at all on the new medication. We will have her get metabolic studies done and lab test to make sure she is stable on the medication and not having any hematologic or medical complications or ESPERANZA virus infections on the medication. She has gotten disability. The visual blurring was probably just minor fluctuations in her previous MS lesions. She has not had any new clear exacerbation or worsening of her MS and her last MRI scans done in November 2021 one of the cervical spine and brain showed stable disease. She still has some blurred vision in her eyes, but is not quite as bad as it was months ago. She was previously on [place]. Patient seen 3 months ago. We reviewed her MRI scans all personally on the PACS system and I agree with report as dictated and discussed that with the patient and her  in detail. Her metabolic parameters and hematologic parameters were also checked to make sure that she is safe with the medication that have marked leukopenia. 3 years ago she has had an episode of optic neuritis in the left eye, and was given IV steroids with some improvement, and her visual acuity is back to 2020 with handcard and near vision today.   She is to get back on her medications including vitamin D and multivitamins and continue her muscle relaxers as needed  She has a visual acuity of 20/20, but has a subjective blurring and slight decreased color desaturation in the left eye as compared to the right at last visit. Patient had an abnormal MRI scan of the brain and the cervical spine showing demyelinating lesions some of which were enhancing in both the brain and the cervical spine last year. The patient had a previous workup in 2011 with abnormal spinal fluid suggesting demyelinating disease with elevated IgG synthesis rate, oligoclonal bands and myelin basic protein, and an abnormal MRI of the brain and cervical spine then, but she never returned for follow-up. She also had a previous episode in 2005 when I saw her 12 years ago, at that time her workup was negative for any signs of MS. She is better, and able to walk without assistive devices, and has gone back to work and no longer on disability. 35 minutes spent with the patient and her  in the office today, going over all the different medications, giving her information to read on them, advising her of the risks, benefits and side effects of all the different medications. Past Medical History:   Diagnosis Date    37 weeks gestation of pregnancy 12/2/2020    Added automatically from request for surgery [id number]    Anemia affecting pregnancy 9/3/2020    Diagnosed 9/2/2020 : H&H 9.6/31 , Ferritin 13 Symptoms: none  If ferritin is <30, then:  --Second trimester Hgb <10.5 g/dL:  --IV iron  PLAN: - messaged pt about starting IV iron on 09/03/20 - patient scheduled at infusion center on 10/5/20 [place].[facility] ] recheck CBC 11/ 2020 - Hgb 10.9, ferritin 365, discontinued oral iron due to high ferritin.     Last Assessment & Plan:  11/12 Hgg 10.9, ferritin 365, disc    Asthma     Ectopic pregnancy 2/14/2019    Gastrointestinal disorder     reflux    GERD (gastroesophageal reflux disease)     Multiple sclerosis (HCC)     Multiple sclerosis affecting pregnancy ([facility] [address] 6/25/2020    Was taking Tecfidera before pregnancy, but has since stopped it. Only MS symptoms are vision loss in her left eye. Last attack was in 2017. --Discussed that pregnancy is associated with a significant decrease in MS disease activity and postpartum period is associated with an increase in MS activity. --Acute MS attacks (relapses) during pregnancy can be treated with intravenous glucocorticoids, w    Multiple sclerosis affecting pregnancy ([facility] [address] 6/25/2020    Was taking Tecfidera before pregnancy, but has since stopped it. Only MS symptoms are vision loss in her left eye. Last attack was in 2017. --Discussed that pregnancy is associated with a significant decrease in MS disease activity and postpartum period is associated with an increase in MS activity. --Acute MS attacks (relapses) during pregnancy can be treated with intravenous glucocorticoids, w    Post-op pain 2/14/2019    Supervision of normal pregnancy 6/25/2020    Dated by LMP Genetic screening: declined Immunization: TDaP at 28 weeks, influenza **, VZV immune (?) rubella immune Rhogam: Not indicated, patient O pos 1hr GTT at 28 weeks GBS at 35 weeks Breastfeeding: Plans to breast feed PP Contraception: need to address Total weight gain: 6 lb 9.6 oz (2.994 kg)  Needs urine NING.  Supervision of normal pregnancy 6/25/2020    Dated by LMP Genetic screening: declined Immunization: TDaP at 28 weeks, influenza **, VZV immune (?) rubella immune Rhogam: Not indicated, patient O pos 1hr GTT at 28 weeks GBS at 35 weeks Breastfeeding: Plans to breast feed PP Contraception: need to address Total weight gain: 6 lb 9.6 oz (2.994 kg)  Needs urine NING. Dated by LMP Genetic screening: declined Immunization: TDaP at 28 weeks 10/1, i    UTI (urinary tract infection) in pregnancy, antepartum 9/3/2020    Initial visit:E.coli UTI diagnosed at initial visit. Treated with Keflex.  NING Urine Sample collected on 9/2 for culture to ensure clearance of UTI - UTI cleared 10/1: denies symptoms 10/12: Found to have pseudomonas UTI, treated with 2g of IM cefepime q12h for 6 doses. Completed on 10/18/2020. 10/29: Urine culture ordered for test of cure, still positive for Pseudomonas. 11/12: Patient s/p Fosfomy       Past Surgical History:   Procedure Laterality Date    HX GYN  10/2013    c section    HX HEENT      tonsils, adenoids, PE tubes       [unfilled]    Social History     Tobacco Use    Smoking status: Never Smoker    Smokeless tobacco: Never Used   Substance Use Topics    Alcohol use: Not Currently           Allergies   Allergen Reactions    Sulfa (Sulfonamide Antibiotics) Hives       Review of Systems:    A comprehensive review of systems was negative except for: Constitutional: positive for fatigue and malaise  Eyes: positive for visual disturbance  Neurological: positive for coordination problems, gait problems, weakness and Visual loss    Objective:      Objective:     @IPVITALS(24:)@      Lab Review No results found for this or any previous visit (from the past 24 hour(s)). Additional comments:I personally viewed and interpreted the patient's MRI scans reviewed personally on the PACS system today, and they were shown to the patient        NEUROLOGICAL EXAM:    Appearance: The patient is well developed, well nourished, provides a coherent history and is in no acute distress. Mental Status: Oriented to time, place and person and the president, cognitive function and fund of knowledge is normal. Speech is fluent without aphasia or dysarthria. Mood and affect appropriate. Cranial Nerves:   Intact visual fields. Fundi are positive and they show optic pallor bilaterally left greater than right at last visit, but not testable this visit. No Cherre Herrlich pupil present at last visit. Visual acuity is 20/20 in the left eye missing only 1 number with near vision, and 20/20 in the right eye at last visit.   CLEMENTE, EOM's full, no nystagmus, no ptosis. Facial sensation is normal. Corneal reflexes are not tested. Facial movement is symmetric. Hearing is normal bilaterally. Palate is midline with normal sternocleidomastoid and trapezius muscles are normal. Tongue is midline. Neck without meningismus or bruits  Temporal arteries are not tender or enlarged    Motor:  4/5 strength in upper and lower proximal and distal muscles. Normal bulk and tone. No fasciculations. Patient has percussion tenderness over the lumbar spine, and tenderness over the paraspinal muscles which are tight and sore  Rapid alternating movement is a little slow but symmetric bilaterally   Reflexes:   Deep tendon reflexes 2+/4 and symmetrical in the upper extremities, and 3+/4 in the lower extremities bilaterally and  No babinski or clonus present at last visit, but not testable this visit   Sensory:   Normal to touch, pinprick and temperature and vibration and temperature and DSS is intact at last visit   Gait:  Abnormal gait with patient moving slowly due to spastic mildly paraplegic gait with ataxia. Finger-nose-finger examination shows no major dysmetria   Tremor:   No tremor noted. Cerebellar:   Mildly abnormal Romberg and tandem cerebellar signs present. Neurovascular:  Normal heart sounds and regular rhythm, peripheral pulses intact, and no carotid bruits at last visit, but not testable this visit. Assessment:        Assessment:       ICD-10-CM ICD-9-CM    1. Multiple sclerosis, relapsing-remitting (HCC)  G35 340 CBC WITH AUTOMATED DIFF      METABOLIC PANEL, COMPREHENSIVE      IMMUNOELECTROPHORESIS (IMMUNOFIX.)      STRATIFY JCV (TM) B W/INDEX   2. Left optic neuritis  H46.9 377.30 CBC WITH AUTOMATED DIFF      METABOLIC PANEL, COMPREHENSIVE      IMMUNOELECTROPHORESIS (IMMUNOFIX.)      STRATIFY JCV (TM) B W/INDEX   3.  Therapeutic drug monitoring  Z51.81 V58.83 CBC WITH AUTOMATED DIFF      METABOLIC PANEL, COMPREHENSIVE      IMMUNOELECTROPHORESIS (IMMUNOFIX.) STRATIFY JCV (TM) B W/INDEX         Plan:     Patient on [place] and doing well, and her last MRI scan looks stable, with no new disease of both the neck and cervical spine. She is tolerating the medication well without any side effects so far. Patient will get blood work done and that was sent to her, to rule out any hematologic or metabolic complications, and to rule out ESPERANZA virus. Patient also seen to evaluate her neurologic status which actually seems to be stable with her symptoms of mild ataxia and visual blurring probably just related to her old MS disease and fluctuations from fatigue and heat and not really an exacerbation. Her last MRI scans of the brain and the spine in November 2021 showed no new lesions. She has had no relapses and no exacerbations on the medication so far. We will repeat her MRI scan at one years time. Patient has finished her physical therapy and try to exercise regularly and stay active. We discussed her condition with her and her family also today   32 minutes met with the patient going over all of this with her in detail and making sure she is stable back at work and not having any new MS symptoms. Follow-up in 3 months time as arranged. Femi Arauz was seen by synchronous (real-time) audio-video technology on 03/02/22. Consent:  She and/or her healthcare decision maker is aware that this patient-initiated Telehealth encounter is a billable service, with coverage as determined by her insurance carrier. She is aware that she may receive a bill and has provided verbal consent to proceed: Yes    I was in the office while conducting this encounter.     Pursuant to the emergency declaration under the [facility] waiver authority and the [employer] Act, this Virtual  Visit was conducted, with patient's consent, to reduce the patient's risk of exposure to COVID-19 and provide continuity of care for an established patient. Services were provided through a video synchronous discussion virtually to substitute for in-person clinic visit.   Signed:  Adonay Hernandez MD  3/2/2022  8:51 PM    King [place] MD  [phone]

## 2022-03-09 ENCOUNTER — TELEPHONE (OUTPATIENT)
Dept: NEUROLOGY | Age: 37
End: 2022-03-09

## 2022-03-09 NOTE — TELEPHONE ENCOUNTER
Pt states paperwork Dr. Crissy Munoz filled out for her for her long term disability. They cannot be accepted because we used medical codes on it. Forms must be completed again with everything written out. Please call with any questions.  [phone]

## 2022-03-10 NOTE — TELEPHONE ENCOUNTER
Forms filled out and may result the diagnosis of multiple sclerosis states that they could not read and forms faxed back

## 2022-03-14 ENCOUNTER — DOCUMENTATION ONLY (OUTPATIENT)
Dept: NEUROLOGY | Age: 37
End: 2022-03-14

## 2022-03-15 ENCOUNTER — TELEPHONE (OUTPATIENT)
Dept: NEUROLOGY | Age: 37
End: 2022-03-15

## 2022-03-15 NOTE — TELEPHONE ENCOUNTER
Pt called about appt that Dr. German Meckel wants her to set up. Wanted him to know that her insurance will not kick in until 4/1.  Please call with any questions [phone]

## 2022-03-17 NOTE — TELEPHONE ENCOUNTER
I do not have a place to put her, but I need to check her again in 3 to 4 months, can you give her to Rhett Aviles for our working list and folder

## 2022-03-18 PROBLEM — Z51.81 THERAPEUTIC DRUG MONITORING: Status: ACTIVE | Noted: 2022-03-02

## 2022-03-18 PROBLEM — G35 MULTIPLE SCLEROSIS, RELAPSING-REMITTING (HCC): Status: ACTIVE | Noted: 2017-06-07

## 2022-03-19 PROBLEM — S16.1XXA CERVICAL STRAIN: Status: ACTIVE | Noted: 2017-12-22

## 2022-03-19 PROBLEM — H46.9 LEFT OPTIC NEURITIS: Status: ACTIVE | Noted: 2017-06-07

## 2022-03-19 PROBLEM — I67.89 CEREBRAL MICROVASCULAR DISEASE: Status: ACTIVE | Noted: 2017-06-08

## 2022-03-19 PROBLEM — S39.012A STRAIN OF LUMBAR SPINE: Status: ACTIVE | Noted: 2017-12-22

## 2022-04-11 ENCOUNTER — TELEPHONE (OUTPATIENT)
Dept: NEUROLOGY | Age: 37
End: 2022-04-11

## 2022-04-11 DIAGNOSIS — G35 MULTIPLE SCLEROSIS, RELAPSING-REMITTING (HCC): ICD-10-CM

## 2022-04-11 RX ORDER — OFATUMUMAB 20 MG/.4ML
20 INJECTION, SOLUTION SUBCUTANEOUS
Qty: 3 EACH | Refills: 5 | Status: SHIPPED | OUTPATIENT
Start: 2022-04-11

## 2022-04-20 ENCOUNTER — TELEPHONE (OUTPATIENT)
Dept: NEUROLOGY | Age: 37
End: 2022-04-20

## 2022-04-25 ENCOUNTER — TELEPHONE (OUTPATIENT)
Dept: NEUROLOGY | Age: 37
End: 2022-04-25

## 2022-04-26 NOTE — TELEPHONE ENCOUNTER
"Subjective:      Patient ID: Nancy Thompson is a 61 y.o. female.    Chief Complaint: Pain of the Left Upper Arm      HPI  (Ray)    Seen in ED on 4/24/22 after tripping and falling at the store. Given sling for left shoulder and she is here for follow up.     She has pain in left shoulder. Not taking norco as it made her loopy. Some relief with motrin 800mg. She is right hand dominant. Having intermittent numbness/tingling in left hand. She rates her pain as an 8 on a scale of 1-10.       Past Medical History:   Diagnosis Date    Hypertension     Kidney problem          Current Outpatient Medications:     ibuprofen (ADVIL,MOTRIN) 800 MG tablet, Take 1 tablet (800 mg total) by mouth 3 (three) times daily after meals., Disp: 90 tablet, Rfl: 2    Review of patient's allergies indicates:  No Known Allergies    Review of Systems   Constitutional: Negative for chills, fever, night sweats and weight gain.   Gastrointestinal: Negative for bowel incontinence, nausea and vomiting.   Genitourinary: Negative for bladder incontinence.   Neurological: Negative for disturbances in coordination and loss of balance.         Objective:        Ht 5' 5" (1.651 m)   Wt 64.2 kg (141 lb 8 oz)   BMI 23.55 kg/m²     Ortho/SPM Exam    Left shoulder exam:   She has tenderness over proximal humerus/shoulder.   Significant bruising noted about the shoulder into her breast and armpit.   Compartments are soft.   ROM of shoulder not tested.     She has good ROM of left wrist/hand. She cannot fully extend left elbow due to pain.     She is NVI distally with good capillary refill.       XRAY INTERPRETATION:  X-rays of left shoulder dated 4/24/22 are personally reviewed and show a comminuted, displaced and angulated fracture of the proximal humerus a involving the humeral shaft and head.    XRs reviewed with Dr. Bell prior to her visit.         Assessment:       Encounter Diagnoses   Name Primary?    Fall, initial encounter Yes    " She got her Kesimpta. Wants to know if this is ok to start?   Stopped the Aubagio 11/16/21    Please let me/her know about this Closed fracture of head of left humerus, initial encounter           Plan:       Nancy was seen today for pain.    Diagnoses and all orders for this visit:    Fall, initial encounter  -     ibuprofen (ADVIL,MOTRIN) 800 MG tablet; Take 1 tablet (800 mg total) by mouth 3 (three) times daily after meals.  -     CT Shoulder Without Contrast Left; Future    Closed fracture of head of left humerus, initial encounter  -     Ambulatory referral/consult to Orthopedics  -     CT Shoulder Without Contrast Left; Future      She had a trip and fall on 4/24/22 and has left proximal humerus fracture. She has pain in left shoulder. She is right hand dominant    Left shoulder XRs show comminuted, displaced and angulated fracture of the proximal humerus a involving the humeral shaft and head.     Treatment options reviewed with patient along with above left shoulder xrays. Following plan made with Dr. Bell:     - CT scan of left shoulder with 3D reconstruction to further evaluation fracture.   - Discussed with patient that she may need surgery, will know more after CT scan.   - Continue with sling. Can remove if watching TV and to bathe. No lifting left arm. Can work on ROM of elbow.   - Norco made her loopy. Continue motrin and refill sent to pharmacy. Reviewed dosing and side effects. Take with food.   - Offered ultram prescription but she wants to hold off.   - Will review CT with Dr. Bell and call her with further plan.     Follow up if symptoms worsen or fail to improve.         ADDENDUM 5/3/22:   CT of left shoulder dated 5/2/22 is personally reviewed and shows:   Severely comminuted, angulated and displaced fracture involving primarily the humeral neck with extension into the humeral head.  No extension to the articular surface.    Above CT reviewed with Dr. Bell. He recommends surgery for her left shoulder, specifically ORIF of left proximal humerus. Will plan to do in [place] on Tuesay 5/10/22 and he wants her to be  seen in [place] for a preop this week. Will call patient and discuss.
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
## 2022-09-13 NOTE — ANESTHESIA PREPROCEDURE EVALUATION
Anesthetic History No history of anesthetic complications Review of Systems / Medical History Patient summary reviewed, nursing notes reviewed and pertinent labs reviewed Pulmonary Asthma Neuro/Psych Within defined limits Cardiovascular Within defined limits Exercise tolerance: >4 METS 
  
GI/Hepatic/Renal 
  
GERD Endo/Other Within defined limits Other Findings Comments: Ectopic pregnancy MS Physical Exam 
 
Airway Mallampati: II 
TM Distance: 4 - 6 cm Neck ROM: normal range of motion Mouth opening: Normal 
 
 Cardiovascular Regular rate and rhythm,  S1 and S2 normal,  no murmur, click, rub, or gallop Dental 
No notable dental hx Pulmonary Breath sounds clear to auscultation Abdominal 
GI exam deferred Other Findings Anesthetic Plan ASA: 2 Anesthesia type: general 
 
Monitoring Plan: BIS Induction: Intravenous Anesthetic plan and risks discussed with: Patient Erythromycin Counseling:  I discussed with the patient the risks of erythromycin including but not limited to GI upset, allergic reaction, drug rash, diarrhea, increase in liver enzymes, and yeast infections.

## 2022-09-26 ENCOUNTER — TELEPHONE (OUTPATIENT)
Dept: NEUROLOGY | Age: 37
End: 2022-09-26

## 2022-09-27 ENCOUNTER — OFFICE VISIT (OUTPATIENT)
Dept: NEUROLOGY | Age: 37
End: 2022-09-27
Payer: MEDICAID

## 2022-09-27 VITALS
BODY MASS INDEX: 21.17 KG/M2 | HEART RATE: 76 BPM | HEIGHT: 64 IN | TEMPERATURE: 97.9 F | SYSTOLIC BLOOD PRESSURE: 94 MMHG | OXYGEN SATURATION: 97 % | DIASTOLIC BLOOD PRESSURE: 58 MMHG | WEIGHT: 124 LBS | RESPIRATION RATE: 18 BRPM

## 2022-09-27 DIAGNOSIS — G35 MULTIPLE SCLEROSIS, RELAPSING-REMITTING (HCC): Primary | ICD-10-CM

## 2022-09-27 DIAGNOSIS — H46.9 LEFT OPTIC NEURITIS: ICD-10-CM

## 2022-09-27 DIAGNOSIS — G35 MULTIPLE SCLEROSIS EXACERBATION (HCC): ICD-10-CM

## 2022-09-27 PROCEDURE — 99214 OFFICE O/P EST MOD 30 MIN: CPT | Performed by: PSYCHIATRY & NEUROLOGY

## 2022-09-27 RX ORDER — ETONOGESTREL 68 MG/1
IMPLANT SUBCUTANEOUS
COMMUNITY

## 2022-09-27 RX ORDER — METHYLPREDNISOLONE 4 MG/1
TABLET ORAL
Qty: 1 DOSE PACK | Refills: 0 | Status: SHIPPED | OUTPATIENT
Start: 2022-09-27 | End: 2022-10-18 | Stop reason: ALTCHOICE

## 2022-09-27 RX ORDER — TIZANIDINE 2 MG/1
2 TABLET ORAL
Qty: 270 TABLET | Refills: 4 | Status: SHIPPED | OUTPATIENT
Start: 2022-09-27

## 2022-10-06 ENCOUNTER — DOCUMENTATION ONLY (OUTPATIENT)
Dept: NEUROLOGY | Age: 37
End: 2022-10-06

## 2022-10-06 LAB
ALBUMIN SERPL-MCNC: 4.7 G/DL (ref 3.8–4.8)
ALBUMIN/GLOB SERPL: 1.9 {RATIO} (ref 1.2–2.2)
ALP SERPL-CCNC: 70 IU/L (ref 44–121)
ALT SERPL-CCNC: 10 IU/L (ref 0–32)
AST SERPL-CCNC: 16 IU/L (ref 0–40)
BILIRUB SERPL-MCNC: 0.3 MG/DL (ref 0–1.2)
BUN SERPL-MCNC: 15 MG/DL (ref 6–20)
BUN/CREAT SERPL: 24 (ref 9–23)
CALCIUM SERPL-MCNC: 8.9 MG/DL (ref 8.7–10.2)
CENTROMERE B AB SER-ACNC: <0.2 AI (ref 0–0.9)
CHLORIDE SERPL-SCNC: 102 MMOL/L (ref 96–106)
CHROMATIN AB SERPL-ACNC: <0.2 AI (ref 0–0.9)
CK SERPL-CCNC: 68 U/L (ref 32–182)
CO2 SERPL-SCNC: 21 MMOL/L (ref 20–29)
CREAT SERPL-MCNC: 0.63 MG/DL (ref 0.57–1)
DSDNA AB SER-ACNC: <1 IU/ML (ref 0–9)
EGFR: 118 ML/MIN/1.73
ENA JO1 AB SER-ACNC: <0.2 AI (ref 0–0.9)
ENA RNP AB SER-ACNC: <0.2 AI (ref 0–0.9)
ENA SCL70 AB SER-ACNC: <0.2 AI (ref 0–0.9)
ENA SM AB SER-ACNC: <0.2 AI (ref 0–0.9)
ENA SM+RNP AB SER-ACNC: <0.2 AI (ref 0–0.9)
ENA SS-A AB SER-ACNC: <0.2 AI (ref 0–0.9)
ENA SS-B AB SER-ACNC: 0.9 AI (ref 0–0.9)
ERYTHROCYTE [SEDIMENTATION RATE] IN BLOOD BY WESTERGREN METHOD: 5 MM/HR (ref 0–32)
GLOBULIN SER CALC-MCNC: 2.5 G/DL (ref 1.5–4.5)
GLUCOSE SERPL-MCNC: 74 MG/DL (ref 70–99)
IGA SERPL-MCNC: 287 MG/DL (ref 87–352)
IGG SERPL-MCNC: 1571 MG/DL (ref 586–1602)
IGM SERPL-MCNC: 62 MG/DL (ref 26–217)
INDEX VALUE: 3.55
INTERPRETATION: ABNORMAL
INTERPRETATION: ABNORMAL
JCPYV AB SERPL QL IA: POSITIVE
JCV AB BY INHIBITION: ABNORMAL
POTASSIUM SERPL-SCNC: 3.7 MMOL/L (ref 3.5–5.2)
PROT PATTERN SERPL IFE-IMP: NORMAL
PROT SERPL-MCNC: 7.2 G/DL (ref 6–8.5)
RIBOSOMAL P AB SER-ACNC: <0.2 AI (ref 0–0.9)
SEE BELOW:, 164879: NORMAL
SODIUM SERPL-SCNC: 139 MMOL/L (ref 134–144)

## 2022-10-18 ENCOUNTER — VIRTUAL VISIT (OUTPATIENT)
Dept: NEUROLOGY | Age: 37
End: 2022-10-18
Payer: MEDICAID

## 2022-10-18 DIAGNOSIS — Z51.81 THERAPEUTIC DRUG MONITORING: ICD-10-CM

## 2022-10-18 DIAGNOSIS — G35 MULTIPLE SCLEROSIS EXACERBATION (HCC): Primary | ICD-10-CM

## 2022-10-18 DIAGNOSIS — H46.9 LEFT OPTIC NEURITIS: ICD-10-CM

## 2022-10-18 DIAGNOSIS — G35 MULTIPLE SCLEROSIS, RELAPSING-REMITTING (HCC): ICD-10-CM

## 2022-10-18 PROCEDURE — 99213 OFFICE O/P EST LOW 20 MIN: CPT | Performed by: PSYCHIATRY & NEUROLOGY

## 2022-10-18 NOTE — PROGRESS NOTES
Neurology Progress Note    Patient ID:  Basil Licona  [id number]  39 y.o.  1985      CHIEF COMPLAINT: Numbness of the right leg    Subjective:      Patient is a 67-year-old right-handed female seen today on work in basis at the request of Dr. Mercedes Resendiz for evaluation of new problem of patient having a recent ESPERANZA virus index titer of 3.5, and an MRI scan was ordered but not been done yet, will be done in the next 2 weeks. She had a recent flareup of her MS with right leg weakness, and was given a Medrol Dosepak which did not help her get better. She had no new symptoms, and no fever and no headache and no alteration in mental status. She was seen 1 month ago for a 1 week history of numbness of her right leg goes from about the hip down to the foot, slightly worse distally as compared to proximally, but not associated with any clear weakness, pain, or back pain, or change in her bowel or bladder function or any left leg symptoms or right arm symptoms. It seemed to come on just 1 morning when she woke up, and has persisted since then not really gotten any better or worse, but does tend to fluctuate some but never goes away completely. She was worked in today for urgent evaluation to rule out flareup of her MS. She has been on the medication of [facility] once a month injections since November or December last year. Her last MRI in November before she started medication showed stable but prominent white matter disease consistent with her diagnosis of MS. Other than that she is taking the medication and had no major side effects or injection site reactions. She has some visual blurring last November but had an MRI scan of the brain and cervical spine that was stable, showing no new lesions and no active MS activity at all on the new medication.   We will have her get metabolic studies done and lab test to make sure she is stable on the medication and not having any hematologic or medical complications or ESPERANZA virus infections on the medication. She has gotten disability. She was previously on [place]. Patient seen 6 months ago. We reviewed her MRI scans all personally on the PACS system and I agree with report as dictated and discussed that with the patient and her  in detail. Her metabolic parameters and hematologic parameters were also checked to make sure that she is safe with the medication that have marked leukopenia. 3 years ago she has had an episode of optic neuritis in the left eye, and was given IV steroids with some improvement, and her visual acuity is back to 20/20 with handcard and near vision today. She is to get back on her medications including vitamin D and multivitamins and continue her muscle relaxers as needed  She has a visual acuity of 20/20, but has a subjective blurring and slight decreased color desaturation in the left eye as compared to the right at last visit. Patient's MRI 2018 with the last 1 did show new lesions, and she has been relatively stable on MRI scans since then. The patient had a previous workup in 2011 with abnormal spinal fluid suggesting demyelinating disease with elevated IgG synthesis rate, oligoclonal bands and myelin basic protein, and an abnormal MRI of the brain and cervical spine then, but she never returned for follow-up. She also had a previous episode in 2005 when I saw her 12 years ago, at that time her workup was negative for any signs of MS. She is better, and able to walk without assistive devices, and has gone back to work and no longer on disability. 35 minutes spent with the patient and her  in the office today, going over all the different medications, giving her information to read on them, advising her of the risks, benefits and side effects of all the different medications.          Past Medical History:   Diagnosis Date    37 weeks gestation of pregnancy 12/2/2020    Added automatically from request for surgery [id number] Anemia affecting pregnancy 9/3/2020    Diagnosed 9/2/2020 : H&H 9.6/31 , Ferritin 13 Symptoms: none  If ferritin is <30, then:  --Second trimester Hgb <10.5 g/dL:  --IV iron  PLAN: - messaged pt about starting IV iron on 09/03/20 - patient scheduled at infusion center on 10/5/20 Kacy.Angry ] recheck CBC 11/ 2020 - Hgb 10.9, ferritin 365, discontinued oral iron due to high ferritin. Last Assessment & Plan:  11/12 Hgg 10.9, ferritin 365, disc    Asthma     Ectopic pregnancy 2/14/2019    Gastrointestinal disorder     reflux    GERD (gastroesophageal reflux disease)     Multiple sclerosis (HCC)     Multiple sclerosis affecting pregnancy ([facility] [address] 6/25/2020    Was taking Tecfidera before pregnancy, but has since stopped it. Only MS symptoms are vision loss in her left eye. Last attack was in 2017. --Discussed that pregnancy is associated with a significant decrease in MS disease activity and postpartum period is associated with an increase in MS activity. --Acute MS attacks (relapses) during pregnancy can be treated with intravenous glucocorticoids, w    Multiple sclerosis affecting pregnancy ([facility] [address] 6/25/2020    Was taking Tecfidera before pregnancy, but has since stopped it. Only MS symptoms are vision loss in her left eye. Last attack was in 2017. --Discussed that pregnancy is associated with a significant decrease in MS disease activity and postpartum period is associated with an increase in MS activity. --Acute MS attacks (relapses) during pregnancy can be treated with intravenous glucocorticoids, w    Post-op pain 2/14/2019    Supervision of normal pregnancy 6/25/2020    Dated by LMP Genetic screening: declined Immunization: TDaP at 28 weeks, influenza **, VZV immune (?) rubella immune Rhogam: Not indicated, patient O pos 1hr GTT at 28 weeks GBS at 35 weeks Breastfeeding: Plans to breast feed PP Contraception: need to address Total weight gain: 6 lb 9.6 oz (2.994 kg)  Needs urine NING.     Supervision of normal pregnancy 6/25/2020    Dated by LMP Genetic screening: declined Immunization: TDaP at 28 weeks, influenza **, VZV immune (?) rubella immune Rhogam: Not indicated, patient O pos 1hr GTT at 28 weeks GBS at 35 weeks Breastfeeding: Plans to breast feed PP Contraception: need to address Total weight gain: 6 lb 9.6 oz (2.994 kg)  Needs urine NING. Dated by LMP Genetic screening: declined Immunization: TDaP at 28 weeks 10/1, i    UTI (urinary tract infection) in pregnancy, antepartum 9/3/2020    Initial visit:E.coli UTI diagnosed at initial visit. Treated with Keflex. NING Urine Sample collected on 9/2 for culture to ensure clearance of UTI - UTI cleared 10/1: denies symptoms 10/12: Found to have pseudomonas UTI, treated with 2g of IM cefepime q12h for 6 doses. Completed on 10/18/2020. 10/29: Urine culture ordered for test of cure, still positive for Pseudomonas. 11/12: Patient s/p Fosfomy       Past Surgical History:   Procedure Laterality Date    HX GYN  10/2013    c section    HX HEENT      tonsils, adenoids, PE tubes       [unfilled]    Social History     Tobacco Use    Smoking status: Never    Smokeless tobacco: Never   Substance Use Topics    Alcohol use: Yes     Alcohol/week: 1.0 standard drink     Types: 1 Glasses of wine per week           Allergies   Allergen Reactions    Sulfa (Sulfonamide Antibiotics) Hives       Review of Systems:    A comprehensive review of systems was negative except for: Constitutional: positive for fatigue and malaise  Eyes: positive for visual disturbance  Neurological: positive for coordination problems, gait problems, weakness and Visual loss    Objective:      Objective:     @IPVITALS(24:)@      Lab Review No results found for this or any previous visit (from the past 24 hour(s)). Additional comments:I personally viewed and interpreted the patient's MRI scans reviewed personally on the PACS system today, and they were shown to the patient        NEUROLOGICAL EXAM:    Appearance:   The patient is well developed, well nourished, provides a coherent history and is in no acute distress. Mental Status: Oriented to time, place and person and the president, cognitive function and fund of knowledge is normal. Speech is fluent without aphasia or dysarthria. Mood and affect appropriate. Cranial Nerves:   Intact visual fields. Fundi are positive and they show optic pallor bilaterally left greater than right at last visit, No [place] pupil present at last visit but not testable this visit. Visual acuity is 20/20 in the left eye missing only 1 number with near vision, and 20/20 in the right eye at last visit. CLEMENTE, EOM's full, no nystagmus, no ptosis. Facial sensation is normal. Corneal reflexes are not tested. Facial movement is symmetric. Hearing is normal bilaterally. Palate is midline with normal sternocleidomastoid and trapezius muscles are normal. Tongue is midline. Neck without meningismus or bruits  Temporal arteries are not tender or enlarged    Motor:  4/5 strength in upper and lower proximal and distal muscles. Normal bulk and tone. No fasciculations. Patient has percussion tenderness over the lumbar spine, and tenderness over the paraspinal muscles which are tight and sore at last visit, but not testable this visit because he was in a car driving  Rapid alternating movement is a little slow but symmetric bilaterally   Reflexes:   Deep tendon reflexes 2+/4 and symmetrical in the upper extremities, and 3+/4 in the lower extremities bilaterally and  No babinski or clonus present    Sensory:   Normal to touch, pinprick and temperature and vibration and temperature and DSS is intact except for a slight decrease sensation of about 30 to 50% in the right leg. Gait:  Abnormal gait with patient moving slowly due to spastic mildly paraplegic gait with ataxia at last visit, not testable this visit because he was in a car driving. Karol Presume examination shows no major dysmetria at last visit but not testable this visit   Tremor:   No tremor noted. Cerebellar:   Mildly abnormal Romberg and tandem cerebellar signs present, at last visit but not testable this visit. Neurovascular:  Normal heart sounds and regular rhythm, peripheral pulses intact, and no carotid bruits at last visit but not         Assessment:        Assessment:       ICD-10-CM ICD-9-CM    1. Multiple sclerosis exacerbation ([facility] [address]  G35 340       2. Multiple sclerosis, relapsing-remitting ([facility] [address]  G35 340       3. Left optic neuritis  H46.9 377.30       4. Therapeutic drug monitoring  Z51.81 V58.83             Plan:     Patient seen on urgent work in basis for telemedicine visit for possible exacerbation of her MS, with numbness of the right leg, with about 30 to 50% decrease involving the whole right leg but somewhat patchy in different areas, but not associated with any clear objective weakness on exam or clinically as noted by the patient, not better after Medrol Dosepak, but not complicated by a positive ESPERANZA virus index titer of 3.5, and has an MRI of the brain and cervical spine still pending to rule out PML, rule out exacerbation, or rule out lumbar radiculopathy which seems less likely. .  Without any back pain, and no leg pain, this is not likely to be a radiculopathy, also involves the whole leg which makes an MS or more central lesion likely. Patient is exam limited today due to very poor connection because patient is driving a car and fading in and out very being examined that well. We will check an MRI of the brain and cervical spine with and without contrast to see if there is any enhancing lesions, or evidence of PML, check a ESPERANZA virus index titer, and routine metabolic studies and immunoglobulins to follow her IgM levels. Patient on Dharmesh Polite and doing well, and her last MRI scan done November 2021 looked stable, with no new disease of both the neck and cervical spine.   She is tolerating the medication well without any side effects so far. Patient will get blood work done and that was sent to her, to rule out any hematologic or metabolic complications, and to rule out ESPERANZA virus. Patient also seen to evaluate her neurologic status which actually seems to be stable with her symptoms of mild ataxia and visual blurring probably just related to her old MS disease and fluctuations from fatigue and heat and not really an exacerbation. Patient also has new complaints of increasing memory difficulty, we explained this more like an MS fall the patient feels that way, and does not think she needs another evaluation, and the  seems understand the cognitive problems with MS. Patient also has problems with fatigue and lack of energy and we recommend that she try to stay physically mentally active take her vitamins and vitamin D but this was also very common problem in over 90% of the patients with MS as is the memory problems. Her last MRI scans of the brain and the spine in November 2021 showed no new lesions. She has had no relapses and no exacerbations on the medication so far. We will repeat her MRI scan since it is almost 1 year and now she is had this acute exacerbation. .  Patient has finished her physical therapy and try to exercise regularly and stay active. We discussed her condition with her and her family also today   24 minutes spent with the patient going over all of this with her in detail and making sure she is stable back at work and not having any new MS symptoms. Follow-up pending results of her MRI scans, we will have to repeat her ESPERANZA virus index titers, VIELKA PCR test, if the MRI scan is positive. Signed:  Chapo Corral MD  10/18/2022  8:51 PM    Beka Lloyd MD  [phone]    Prachi Godfrey was seen by synchronous (real-time) audio-video technology on 10/18/22.      Consent:  She and/or her healthcare decision maker is aware that this patient-initiated Telehealth encounter is a billable service, with coverage as determined by her insurance carrier. She is aware that she may receive a bill and has provided verbal consent to proceed: Yes    I was in the office while conducting this encounter. Pursuant to the emergency declaration under the [facility], [facility] waiver authority and the [employer] and [employer] Act, this Virtual  Visit was conducted, with patient's consent, to reduce the patient's risk of exposure to COVID-19 and provide continuity of care for an established patient. Services were provided through a video synchronous discussion virtually to substitute for in-person clinic visit.

## 2023-03-27 DIAGNOSIS — G35 MULTIPLE SCLEROSIS, RELAPSING-REMITTING (HCC): ICD-10-CM

## 2023-03-27 RX ORDER — OFATUMUMAB 20 MG/.4ML
INJECTION, SOLUTION SUBCUTANEOUS
Qty: 3 EACH | Refills: 4 | Status: SHIPPED | OUTPATIENT
Start: 2023-03-27

## 2023-05-08 ENCOUNTER — TELEPHONE (OUTPATIENT)
Age: 38
End: 2023-05-08

## 2023-05-10 NOTE — TELEPHONE ENCOUNTER
Yvette Clemens renewal called in to [facility]    Test claim run - Rec'd paid claim today -     [employer] rep/ Zeinab in P. A. Dept for [employer]  [phone]. Time 11:52 am     I asked her to run in into the future months to confirm no PA is required for Yvette Clemens    Upon her further review, she could see PA will  on 23 and Peter Pay states she cannot do anything until 23 because it shows \"cost exceeds\"  I tried to explain to her I realize the test claims she is running is rejecting because the current auth expires  and she should be able to use a start date of . She stated no, that they have to wait for the patient to attempt to , then it will reject, then call back to do a new PA. [facility] and to attempt to get a PA done manually via fax. Faxed to [phone].

## 2023-05-16 RX ORDER — ALBUTEROL SULFATE 90 UG/1
AEROSOL, METERED RESPIRATORY (INHALATION)
COMMUNITY

## 2023-05-16 RX ORDER — SERTRALINE HYDROCHLORIDE 25 MG/1
1 TABLET, FILM COATED ORAL DAILY
COMMUNITY
Start: 2020-10-08

## 2023-05-16 RX ORDER — ETONOGESTREL 68 MG/1
IMPLANT SUBCUTANEOUS
COMMUNITY

## 2023-05-16 RX ORDER — TIZANIDINE 2 MG/1
2 TABLET ORAL 3 TIMES DAILY PRN
COMMUNITY
Start: 2022-09-27

## 2023-05-16 RX ORDER — HYDROXYZINE HYDROCHLORIDE 25 MG/1
25 TABLET, FILM COATED ORAL 3 TIMES DAILY PRN
COMMUNITY
Start: 2021-04-20 | End: 2023-05-25 | Stop reason: SDUPTHER

## 2023-05-16 RX ORDER — OFATUMUMAB 20 MG/.4ML
INJECTION, SOLUTION SUBCUTANEOUS
COMMUNITY
Start: 2023-03-27 | End: 2023-07-07 | Stop reason: SDUPTHER

## 2023-05-16 RX ORDER — IBUPROFEN 800 MG/1
800 TABLET ORAL EVERY 8 HOURS PRN
COMMUNITY
Start: 2021-11-05

## 2023-05-16 RX ORDER — FLUTICASONE PROPIONATE 50 MCG
2 SPRAY, SUSPENSION (ML) NASAL DAILY
COMMUNITY

## 2023-05-23 ENCOUNTER — TELEPHONE (OUTPATIENT)
Age: 38
End: 2023-05-23

## 2023-05-24 NOTE — TELEPHONE ENCOUNTER
Spoke with [employer] [facility] [facility] and they are requesting if a pa is being persued for the patient sarabjit.   Advised of Shavonne's note on starting a prior auth

## 2023-05-25 RX ORDER — HYDROXYZINE HYDROCHLORIDE 25 MG/1
25 TABLET, FILM COATED ORAL 3 TIMES DAILY PRN
Qty: 90 TABLET | Refills: 5 | Status: SHIPPED | OUTPATIENT
Start: 2023-05-25

## 2023-05-25 NOTE — TELEPHONE ENCOUNTER
Pt called for refill of:    Hydrooxyzine 25 mg    Please send to [facility] in [place] [phone]

## 2023-06-07 RX ORDER — ASCORBIC ACID 250 MG
TABLET ORAL
COMMUNITY

## 2023-06-07 ASSESSMENT — PATIENT HEALTH QUESTIONNAIRE - PHQ9
SUM OF ALL RESPONSES TO PHQ QUESTIONS 1-9: 0
SUM OF ALL RESPONSES TO PHQ9 QUESTIONS 1 & 2: 0
SUM OF ALL RESPONSES TO PHQ QUESTIONS 1-9: 0
SUM OF ALL RESPONSES TO PHQ QUESTIONS 1-9: 0
1. LITTLE INTEREST OR PLEASURE IN DOING THINGS: 0
2. FEELING DOWN, DEPRESSED OR HOPELESS: 0
SUM OF ALL RESPONSES TO PHQ QUESTIONS 1-9: 0

## 2023-06-08 ENCOUNTER — TELEPHONE (OUTPATIENT)
Age: 38
End: 2023-06-08

## 2023-06-08 ENCOUNTER — TELEMEDICINE (OUTPATIENT)
Age: 38
End: 2023-06-08
Payer: MEDICAID

## 2023-06-08 DIAGNOSIS — Z51.81 THERAPEUTIC DRUG MONITORING: ICD-10-CM

## 2023-06-08 DIAGNOSIS — G35 MULTIPLE SCLEROSIS, RELAPSING-REMITTING (HCC): ICD-10-CM

## 2023-06-08 DIAGNOSIS — G35 MULTIPLE SCLEROSIS EXACERBATION (HCC): Primary | ICD-10-CM

## 2023-06-08 DIAGNOSIS — H46.9 LEFT OPTIC NEURITIS: ICD-10-CM

## 2023-06-08 DIAGNOSIS — G35 MULTIPLE SCLEROSIS EXACERBATION (HCC): ICD-10-CM

## 2023-06-08 PROCEDURE — 99214 OFFICE O/P EST MOD 30 MIN: CPT | Performed by: PSYCHIATRY & NEUROLOGY

## 2023-06-08 NOTE — PROGRESS NOTES
aware that she may receive a bill and has provided verbal consent to proceed: Yes    I was in the office while conducting this encounter. Pursuant to the emergency declaration under the [address] [place] [address], [facility] waiver authority and the [employer] and [employer] Act, this Virtual  Visit was conducted, with patient's consent, to reduce the patient's risk of exposure to COVID-19 and provide continuity of care for an established patient. Services were provided through a video synchronous discussion virtually to substitute for in-person clinic visit.   Signed By: Yan Dee MD     June 8, 2023       CC: Elizabeth Martines MD  FAX: [phone]

## 2023-07-06 ENCOUNTER — TELEPHONE (OUTPATIENT)
Age: 38
End: 2023-07-06

## 2023-07-07 RX ORDER — OFATUMUMAB 20 MG/.4ML
INJECTION, SOLUTION SUBCUTANEOUS
Qty: 3 ADJUSTABLE DOSE PRE-FILLED PEN SYRINGE | Refills: 3 | Status: SHIPPED | OUTPATIENT
Start: 2023-07-07

## 2023-08-01 ENCOUNTER — TELEPHONE (OUTPATIENT)
Age: 38
End: 2023-08-01

## 2023-08-01 NOTE — TELEPHONE ENCOUNTER
Re: Mylo Curling re: renewal as her [place] plan has . Called pt - ph [phone] she is working with her  and will call me back after she has a phone appt w/ him at 3 pm today. She will upload the cards that she has - including [facility], but not sure if it includes pharmacy benefits until she can confirm. Her [place] medicaid plan termed 23, per Frances/SILIVO  and I confirmed same on [employer] website. She will leave vm for me on [phone] tomorrow. Email sent to [place] as Jaxson Ng.

## 2023-08-25 RX ORDER — TIZANIDINE 2 MG/1
2 TABLET ORAL 3 TIMES DAILY PRN
Qty: 100 TABLET | Refills: 5 | Status: SHIPPED | OUTPATIENT
Start: 2023-08-25

## 2023-08-25 RX ORDER — IBUPROFEN 800 MG/1
800 TABLET ORAL EVERY 8 HOURS PRN
Qty: 120 TABLET | Refills: 5 | Status: SHIPPED | OUTPATIENT
Start: 2023-08-25

## 2023-08-25 RX ORDER — HYDROXYZINE HYDROCHLORIDE 25 MG/1
25 TABLET, FILM COATED ORAL 3 TIMES DAILY PRN
Qty: 90 TABLET | Refills: 5 | Status: SHIPPED | OUTPATIENT
Start: 2023-08-25

## 2023-08-25 NOTE — TELEPHONE ENCOUNTER
Received request by fax from [facility] for 3 medications.    Last office visit 6/8/2023  Last med refill  Hydroxyzine 5/25/2023  Tizanidine 9/27/2022  Ibuprofen 11/5/2021

## 2024-06-11 ENCOUNTER — TELEPHONE (OUTPATIENT)
Age: 39
End: 2024-06-11

## 2024-06-11 NOTE — TELEPHONE ENCOUNTER
Called [facility] back couldn't get through kept getting a recording for life alert. Patient hasn't been seen in over 1 year, need to  a appointment in the  before a refill and be made.     Last office visit:06/08/2023  Next office visit: No future visit scheduled   Last refill:07/07/2023

## 2024-06-11 NOTE — TELEPHONE ENCOUNTER
[facility] called to request a refill for Ofatumumab (KESIMPTA) 20 MG/0.4ML SOAJ . Contact # [phone]

## 2024-06-27 RX ORDER — OFATUMUMAB 20 MG/.4ML
INJECTION, SOLUTION SUBCUTANEOUS
Refills: 3 | OUTPATIENT
Start: 2024-06-27

## 2024-06-27 NOTE — TELEPHONE ENCOUNTER
Last office visit: 06/08/2023  Next office visit: No future visit scheduled  Last refill: 07/07/2023

## 2024-07-09 RX ORDER — OFATUMUMAB 20 MG/.4ML
INJECTION, SOLUTION SUBCUTANEOUS
Refills: 3 | OUTPATIENT
Start: 2024-07-09

## 2024-07-11 ENCOUNTER — TELEPHONE (OUTPATIENT)
Age: 39
End: 2024-07-11

## 2024-07-11 NOTE — TELEPHONE ENCOUNTER
Returned [facility] call. ID patient with two identifiers. Informed her that we haven't seen patient since 06/08/2023. 12/19/2023 she was a no show. So we can't refill her order and she doesn't have a follow up visit scheduled. She verbalized understanding.

## 2024-08-06 ENCOUNTER — CLINICAL DOCUMENTATION (OUTPATIENT)
Age: 39
End: 2024-08-06

## 2024-09-04 RX ORDER — HYDROXYZINE HYDROCHLORIDE 25 MG/1
TABLET, FILM COATED ORAL
Qty: 90 TABLET | Refills: 11 | OUTPATIENT
Start: 2024-09-04

## 2024-09-04 NOTE — TELEPHONE ENCOUNTER
Patient last seen 6/8/23 and no-showed the follow up on 12/19/23. Can not fill until patient is seen in-clinic

## 2024-09-06 RX ORDER — TIZANIDINE 2 MG/1
TABLET ORAL
Qty: 100 TABLET | Refills: 5 | OUTPATIENT
Start: 2024-09-06

## 2025-02-13 NOTE — TELEPHONE ENCOUNTER
Patient is having difficulty walking, she thinks that it may be due to her sciatic nerve and her being pregnant. She would like to spk with the nurse regding this. [facility]

## (undated) DEVICE — SOLUTION IRRIG 3000ML 0.9% SOD CHL FLX CONT 0797208] ICU MEDICAL INC]

## (undated) DEVICE — Device

## (undated) DEVICE — DEVON™ KNEE AND BODY STRAP 60" X 3" (1.5 M X 7.6 CM): Brand: DEVON

## (undated) DEVICE — APPLICATOR BNDG 1MM ADH PREMIERPRO EXOFIN

## (undated) DEVICE — TRAY CATH 16F DRN BG LTX -- CONVERT TO ITEM 363158

## (undated) DEVICE — SPECIMEN RETRIEVAL POUCH: Brand: ENDO CATCH GOLD

## (undated) DEVICE — SHEAR HARMONIC ACET 5MMX36CM -- ACE PLUS

## (undated) DEVICE — GOWN,SIRUS,NONRNF,SETINSLV,XL,20/CS: Brand: MEDLINE

## (undated) DEVICE — PREP PAD BNS: Brand: CONVERTORS

## (undated) DEVICE — (D)PREP SKN CHLRAPRP APPL 26ML -- CONVERT TO ITEM 371833

## (undated) DEVICE — KENDALL SCD EXPRESS SLEEVES, KNEE LENGTH, MEDIUM: Brand: KENDALL SCD

## (undated) DEVICE — SURGICAL PROCEDURE PACK GYN LAPAROSCOPY CUST SMH LF

## (undated) DEVICE — SUTURE SZ 0 27IN 5/8 CIR UR-6  TAPER PT VIOLET ABSRB VICRYL J603H

## (undated) DEVICE — HANDLE LT SNAP ON ULT DURABLE LENS FOR TRUMPF ALC DISPOSABLE

## (undated) DEVICE — TRAY PREP DRY W/ PREM GLV 2 APPL 6 SPNG 2 UNDPD 1 OVERWRAP

## (undated) DEVICE — PAD SANIT NPKN 4IN GRD

## (undated) DEVICE — TROCAR ENDOSCP L100MM DIA12MM BLNT STBL SL DISP ENDOPATH

## (undated) DEVICE — STERILE POLYISOPRENE POWDER-FREE SURGICAL GLOVES: Brand: PROTEXIS

## (undated) DEVICE — TROCAR ENDOSCP L100MM DIA5MM BLDELSS STBL SL THRD OPT VW

## (undated) DEVICE — SUTURE VCRL SZ 4-0 L18IN ABSRB UD L19MM PS-2 3/8 CIR PRIM J496H

## (undated) DEVICE — REM POLYHESIVE ADULT PATIENT RETURN ELECTRODE: Brand: VALLEYLAB

## (undated) DEVICE — SLEEVE TRCR L100MM DIA5MM UNIV STBL FOR BLDELSS DIL TIP

## (undated) DEVICE — BLADE ASSEMB CLP HAIR FINE --